# Patient Record
Sex: FEMALE | Race: WHITE | NOT HISPANIC OR LATINO
[De-identification: names, ages, dates, MRNs, and addresses within clinical notes are randomized per-mention and may not be internally consistent; named-entity substitution may affect disease eponyms.]

---

## 2017-02-09 ENCOUNTER — RESULT REVIEW (OUTPATIENT)
Age: 32
End: 2017-02-09

## 2017-02-10 ENCOUNTER — INPATIENT (INPATIENT)
Facility: HOSPITAL | Age: 32
LOS: 0 days | Discharge: HOME | End: 2017-02-11
Attending: COLON & RECTAL SURGERY | Admitting: COLON & RECTAL SURGERY
Payer: COMMERCIAL

## 2017-02-10 ENCOUNTER — EMERGENCY (EMERGENCY)
Facility: HOSPITAL | Age: 32
LOS: 1 days | Discharge: HOME | End: 2017-02-10
Attending: EMERGENCY MEDICINE
Payer: COMMERCIAL

## 2017-02-10 ENCOUNTER — TRANSCRIPTION ENCOUNTER (OUTPATIENT)
Age: 32
End: 2017-02-10

## 2017-02-10 VITALS
OXYGEN SATURATION: 100 % | TEMPERATURE: 98 F | HEART RATE: 90 BPM | WEIGHT: 139.99 LBS | HEIGHT: 67 IN | DIASTOLIC BLOOD PRESSURE: 65 MMHG | RESPIRATION RATE: 16 BRPM | SYSTOLIC BLOOD PRESSURE: 123 MMHG

## 2017-02-10 VITALS
HEIGHT: 67 IN | OXYGEN SATURATION: 99 % | TEMPERATURE: 98 F | WEIGHT: 139.99 LBS | RESPIRATION RATE: 16 BRPM | SYSTOLIC BLOOD PRESSURE: 116 MMHG | HEART RATE: 90 BPM | DIASTOLIC BLOOD PRESSURE: 68 MMHG

## 2017-02-10 DIAGNOSIS — Z90.5 ACQUIRED ABSENCE OF KIDNEY: Chronic | ICD-10-CM

## 2017-02-10 DIAGNOSIS — K37 UNSPECIFIED APPENDICITIS: ICD-10-CM

## 2017-02-10 DIAGNOSIS — Z88.0 ALLERGY STATUS TO PENICILLIN: ICD-10-CM

## 2017-02-10 DIAGNOSIS — K35.3 ACUTE APPENDICITIS WITH LOCALIZED PERITONITIS: ICD-10-CM

## 2017-02-10 DIAGNOSIS — Z98.89 OTHER SPECIFIED POSTPROCEDURAL STATES: Chronic | ICD-10-CM

## 2017-02-10 DIAGNOSIS — Z29.9 ENCOUNTER FOR PROPHYLACTIC MEASURES, UNSPECIFIED: ICD-10-CM

## 2017-02-10 LAB
ALBUMIN SERPL ELPH-MCNC: 3.7 G/DL — SIGNIFICANT CHANGE UP (ref 3.3–5)
ALP SERPL-CCNC: 70 U/L — SIGNIFICANT CHANGE UP (ref 40–120)
ALT FLD-CCNC: 20 U/L — SIGNIFICANT CHANGE UP (ref 12–78)
ANION GAP SERPL CALC-SCNC: 8 MMOL/L — SIGNIFICANT CHANGE UP (ref 5–17)
APPEARANCE UR: ABNORMAL
APTT BLD: 33.5 SEC — SIGNIFICANT CHANGE UP (ref 27.5–37.4)
AST SERPL-CCNC: 15 U/L — SIGNIFICANT CHANGE UP (ref 15–37)
BASOPHILS # BLD AUTO: 0 K/UL — SIGNIFICANT CHANGE UP (ref 0–0.2)
BASOPHILS NFR BLD AUTO: 0.8 % — SIGNIFICANT CHANGE UP (ref 0–2)
BILIRUB SERPL-MCNC: 0.6 MG/DL — SIGNIFICANT CHANGE UP (ref 0.2–1.2)
BILIRUB UR-MCNC: NEGATIVE — SIGNIFICANT CHANGE UP
BLD GP AB SCN SERPL QL: SIGNIFICANT CHANGE UP
BUN SERPL-MCNC: 11 MG/DL — SIGNIFICANT CHANGE UP (ref 7–23)
CALCIUM SERPL-MCNC: 8.6 MG/DL — SIGNIFICANT CHANGE UP (ref 8.5–10.1)
CHLORIDE SERPL-SCNC: 107 MMOL/L — SIGNIFICANT CHANGE UP (ref 96–108)
CO2 SERPL-SCNC: 25 MMOL/L — SIGNIFICANT CHANGE UP (ref 22–31)
COLOR SPEC: YELLOW — SIGNIFICANT CHANGE UP
CREAT SERPL-MCNC: 1.13 MG/DL — SIGNIFICANT CHANGE UP (ref 0.5–1.3)
DIFF PNL FLD: NEGATIVE — SIGNIFICANT CHANGE UP
EOSINOPHIL # BLD AUTO: 0 K/UL — SIGNIFICANT CHANGE UP (ref 0–0.5)
EOSINOPHIL NFR BLD AUTO: 0.3 % — SIGNIFICANT CHANGE UP (ref 0–6)
EPI CELLS # UR: SIGNIFICANT CHANGE UP
GLUCOSE SERPL-MCNC: 86 MG/DL — SIGNIFICANT CHANGE UP (ref 70–99)
GLUCOSE UR QL: NEGATIVE MG/DL — SIGNIFICANT CHANGE UP
HCG SERPL-ACNC: <1 MIU/ML — SIGNIFICANT CHANGE UP
HCT VFR BLD CALC: 41.6 % — SIGNIFICANT CHANGE UP (ref 34.5–45)
HGB BLD-MCNC: 14.8 G/DL — SIGNIFICANT CHANGE UP (ref 11.5–15.5)
INR BLD: 1.12 RATIO — SIGNIFICANT CHANGE UP (ref 0.88–1.16)
KETONES UR-MCNC: NEGATIVE — SIGNIFICANT CHANGE UP
LACTATE SERPL-SCNC: 0.6 MMOL/L — LOW (ref 0.7–2)
LEUKOCYTE ESTERASE UR-ACNC: ABNORMAL
LIDOCAIN IGE QN: 146 U/L — SIGNIFICANT CHANGE UP (ref 73–393)
LYMPHOCYTES # BLD AUTO: 1.3 K/UL — SIGNIFICANT CHANGE UP (ref 1–3.3)
LYMPHOCYTES # BLD AUTO: 27.7 % — SIGNIFICANT CHANGE UP (ref 13–44)
MCHC RBC-ENTMCNC: 30.9 PG — SIGNIFICANT CHANGE UP (ref 27–34)
MCHC RBC-ENTMCNC: 35.6 GM/DL — SIGNIFICANT CHANGE UP (ref 32–36)
MCV RBC AUTO: 86.8 FL — SIGNIFICANT CHANGE UP (ref 80–100)
MONOCYTES # BLD AUTO: 0.2 K/UL — SIGNIFICANT CHANGE UP (ref 0–0.9)
MONOCYTES NFR BLD AUTO: 5.4 % — SIGNIFICANT CHANGE UP (ref 2–14)
NEUTROPHILS # BLD AUTO: 3 K/UL — SIGNIFICANT CHANGE UP (ref 1.8–7.4)
NEUTROPHILS NFR BLD AUTO: 65.8 % — SIGNIFICANT CHANGE UP (ref 43–77)
NITRITE UR-MCNC: NEGATIVE — SIGNIFICANT CHANGE UP
PH UR: 5 — SIGNIFICANT CHANGE UP (ref 4.8–8)
PLATELET # BLD AUTO: 186 K/UL — SIGNIFICANT CHANGE UP (ref 150–400)
POTASSIUM SERPL-MCNC: 3.9 MMOL/L — SIGNIFICANT CHANGE UP (ref 3.5–5.3)
POTASSIUM SERPL-SCNC: 3.9 MMOL/L — SIGNIFICANT CHANGE UP (ref 3.5–5.3)
PROT SERPL-MCNC: 7.7 GM/DL — SIGNIFICANT CHANGE UP (ref 6–8.3)
PROT UR-MCNC: 15 MG/DL
PROTHROM AB SERPL-ACNC: 12.6 SEC — SIGNIFICANT CHANGE UP (ref 10–13.1)
RBC # BLD: 4.8 M/UL — SIGNIFICANT CHANGE UP (ref 3.8–5.2)
RBC # FLD: 11.8 % — SIGNIFICANT CHANGE UP (ref 11–15)
SODIUM SERPL-SCNC: 140 MMOL/L — SIGNIFICANT CHANGE UP (ref 135–145)
SP GR SPEC: 1.02 — SIGNIFICANT CHANGE UP (ref 1.01–1.02)
UROBILINOGEN FLD QL: NEGATIVE MG/DL — SIGNIFICANT CHANGE UP
WBC # BLD: 4.6 K/UL — SIGNIFICANT CHANGE UP (ref 3.8–10.5)
WBC # FLD AUTO: 4.6 K/UL — SIGNIFICANT CHANGE UP (ref 3.8–10.5)
WBC UR QL: SIGNIFICANT CHANGE UP

## 2017-02-10 PROCEDURE — 71010: CPT | Mod: 26

## 2017-02-10 PROCEDURE — 88304 TISSUE EXAM BY PATHOLOGIST: CPT | Mod: 26

## 2017-02-10 PROCEDURE — 74176 CT ABD & PELVIS W/O CONTRAST: CPT | Mod: 26

## 2017-02-10 PROCEDURE — 44970 LAPAROSCOPY APPENDECTOMY: CPT | Mod: AS

## 2017-02-10 PROCEDURE — 99285 EMERGENCY DEPT VISIT HI MDM: CPT

## 2017-02-10 RX ORDER — DOCUSATE SODIUM 100 MG
100 CAPSULE ORAL THREE TIMES A DAY
Qty: 0 | Refills: 0 | Status: DISCONTINUED | OUTPATIENT
Start: 2017-02-10 | End: 2017-02-11

## 2017-02-10 RX ORDER — SODIUM CHLORIDE 9 MG/ML
1000 INJECTION INTRAMUSCULAR; INTRAVENOUS; SUBCUTANEOUS
Qty: 0 | Refills: 0 | Status: DISCONTINUED | OUTPATIENT
Start: 2017-02-10 | End: 2017-02-10

## 2017-02-10 RX ORDER — ACETAMINOPHEN 500 MG
900 TABLET ORAL ONCE
Qty: 0 | Refills: 0 | Status: COMPLETED | OUTPATIENT
Start: 2017-02-10 | End: 2017-02-10

## 2017-02-10 RX ORDER — SODIUM CHLORIDE 9 MG/ML
1000 INJECTION, SOLUTION INTRAVENOUS
Qty: 0 | Refills: 0 | Status: DISCONTINUED | OUTPATIENT
Start: 2017-02-10 | End: 2017-02-10

## 2017-02-10 RX ORDER — ONDANSETRON 8 MG/1
4 TABLET, FILM COATED ORAL EVERY 6 HOURS
Qty: 0 | Refills: 0 | Status: DISCONTINUED | OUTPATIENT
Start: 2017-02-10 | End: 2017-02-11

## 2017-02-10 RX ORDER — METRONIDAZOLE 500 MG
500 TABLET ORAL ONCE
Qty: 0 | Refills: 0 | Status: COMPLETED | OUTPATIENT
Start: 2017-02-10 | End: 2017-02-10

## 2017-02-10 RX ORDER — SENNA PLUS 8.6 MG/1
2 TABLET ORAL AT BEDTIME
Qty: 0 | Refills: 0 | Status: DISCONTINUED | OUTPATIENT
Start: 2017-02-10 | End: 2017-02-11

## 2017-02-10 RX ORDER — KETOROLAC TROMETHAMINE 30 MG/ML
15 SYRINGE (ML) INJECTION EVERY 6 HOURS
Qty: 0 | Refills: 0 | Status: DISCONTINUED | OUTPATIENT
Start: 2017-02-10 | End: 2017-02-11

## 2017-02-10 RX ORDER — ONDANSETRON 8 MG/1
8 TABLET, FILM COATED ORAL ONCE
Qty: 0 | Refills: 0 | Status: DISCONTINUED | OUTPATIENT
Start: 2017-02-10 | End: 2017-02-14

## 2017-02-10 RX ORDER — ONDANSETRON 8 MG/1
4 TABLET, FILM COATED ORAL ONCE
Qty: 0 | Refills: 0 | Status: COMPLETED | OUTPATIENT
Start: 2017-02-10 | End: 2017-02-10

## 2017-02-10 RX ORDER — SODIUM CHLORIDE 9 MG/ML
1000 INJECTION, SOLUTION INTRAVENOUS
Qty: 0 | Refills: 0 | Status: DISCONTINUED | OUTPATIENT
Start: 2017-02-10 | End: 2017-02-11

## 2017-02-10 RX ORDER — KETOROLAC TROMETHAMINE 30 MG/ML
15 SYRINGE (ML) INJECTION ONCE
Qty: 0 | Refills: 0 | Status: DISCONTINUED | OUTPATIENT
Start: 2017-02-10 | End: 2017-02-10

## 2017-02-10 RX ORDER — CIPROFLOXACIN LACTATE 400MG/40ML
400 VIAL (ML) INTRAVENOUS ONCE
Qty: 0 | Refills: 0 | Status: COMPLETED | OUTPATIENT
Start: 2017-02-10 | End: 2017-02-10

## 2017-02-10 RX ORDER — CIPROFLOXACIN LACTATE 400MG/40ML
VIAL (ML) INTRAVENOUS
Qty: 0 | Refills: 0 | Status: DISCONTINUED | OUTPATIENT
Start: 2017-02-10 | End: 2017-02-10

## 2017-02-10 RX ORDER — IOHEXOL 300 MG/ML
30 INJECTION, SOLUTION INTRAVENOUS ONCE
Qty: 0 | Refills: 0 | Status: COMPLETED | OUTPATIENT
Start: 2017-02-10 | End: 2017-02-10

## 2017-02-10 RX ORDER — KETOROLAC TROMETHAMINE 30 MG/ML
15 SYRINGE (ML) INJECTION ONCE
Qty: 0 | Refills: 0 | Status: COMPLETED | OUTPATIENT
Start: 2017-02-10 | End: 2017-02-10

## 2017-02-10 RX ORDER — METRONIDAZOLE 500 MG
TABLET ORAL
Qty: 0 | Refills: 0 | Status: DISCONTINUED | OUTPATIENT
Start: 2017-02-10 | End: 2017-02-10

## 2017-02-10 RX ORDER — METRONIDAZOLE 500 MG
500 TABLET ORAL EVERY 8 HOURS
Qty: 0 | Refills: 0 | Status: CANCELLED | OUTPATIENT
Start: 2017-02-11 | End: 2017-02-10

## 2017-02-10 RX ORDER — FENTANYL CITRATE 50 UG/ML
50 INJECTION INTRAVENOUS
Qty: 0 | Refills: 0 | Status: DISCONTINUED | OUTPATIENT
Start: 2017-02-10 | End: 2017-02-10

## 2017-02-10 RX ORDER — SODIUM CHLORIDE 9 MG/ML
1000 INJECTION INTRAMUSCULAR; INTRAVENOUS; SUBCUTANEOUS ONCE
Qty: 0 | Refills: 0 | Status: COMPLETED | OUTPATIENT
Start: 2017-02-10 | End: 2017-02-10

## 2017-02-10 RX ORDER — HEPARIN SODIUM 5000 [USP'U]/ML
5000 INJECTION INTRAVENOUS; SUBCUTANEOUS EVERY 12 HOURS
Qty: 0 | Refills: 0 | Status: DISCONTINUED | OUTPATIENT
Start: 2017-02-10 | End: 2017-02-11

## 2017-02-10 RX ORDER — HEPARIN SODIUM 5000 [USP'U]/ML
5000 INJECTION INTRAVENOUS; SUBCUTANEOUS EVERY 12 HOURS
Qty: 0 | Refills: 0 | Status: DISCONTINUED | OUTPATIENT
Start: 2017-02-10 | End: 2017-02-10

## 2017-02-10 RX ORDER — CIPROFLOXACIN LACTATE 400MG/40ML
400 VIAL (ML) INTRAVENOUS EVERY 12 HOURS
Qty: 0 | Refills: 0 | Status: CANCELLED | OUTPATIENT
Start: 2017-02-11 | End: 2017-02-10

## 2017-02-10 RX ORDER — ACETAMINOPHEN 500 MG
650 TABLET ORAL EVERY 6 HOURS
Qty: 0 | Refills: 0 | Status: DISCONTINUED | OUTPATIENT
Start: 2017-02-10 | End: 2017-02-11

## 2017-02-10 RX ADMIN — Medication 200 MILLIGRAM(S): at 20:07

## 2017-02-10 RX ADMIN — Medication 15 MILLIGRAM(S): at 19:27

## 2017-02-10 RX ADMIN — Medication 100 MILLIGRAM(S): at 19:53

## 2017-02-10 RX ADMIN — SODIUM CHLORIDE 1000 MILLILITER(S): 9 INJECTION INTRAMUSCULAR; INTRAVENOUS; SUBCUTANEOUS at 13:26

## 2017-02-10 RX ADMIN — FENTANYL CITRATE 50 MICROGRAM(S): 50 INJECTION INTRAVENOUS at 22:41

## 2017-02-10 RX ADMIN — Medication 15 MILLIGRAM(S): at 20:07

## 2017-02-10 RX ADMIN — IOHEXOL 30 MILLILITER(S): 300 INJECTION, SOLUTION INTRAVENOUS at 13:16

## 2017-02-10 RX ADMIN — SODIUM CHLORIDE 100 MILLILITER(S): 9 INJECTION, SOLUTION INTRAVENOUS at 21:55

## 2017-02-10 RX ADMIN — FENTANYL CITRATE 50 MICROGRAM(S): 50 INJECTION INTRAVENOUS at 22:35

## 2017-02-10 RX ADMIN — SODIUM CHLORIDE 100 MILLILITER(S): 9 INJECTION INTRAMUSCULAR; INTRAVENOUS; SUBCUTANEOUS at 19:30

## 2017-02-10 RX ADMIN — ONDANSETRON 4 MILLIGRAM(S): 8 TABLET, FILM COATED ORAL at 19:30

## 2017-02-10 NOTE — ED PROVIDER NOTE - PROGRESS NOTE DETAILS
Pt noted to have appendicitis likely as pt still having umbilical tenderness and pain - when looking for patient -could not be found in ED - call made to patient - who answered stating was too busy in ED -felt was not receiving attention that she needed and so pt left.

## 2017-02-10 NOTE — ED ADULT NURSE REASSESSMENT NOTE - NS ED NURSE REASSESS COMMENT FT1
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

## 2017-02-10 NOTE — ED PROVIDER NOTE - CONSTITUTIONAL, MLM
normal... Well appearing, well nourished, awake, alert, oriented to person, place, time/situation and in mild distress secondary to pain

## 2017-02-10 NOTE — ED PROVIDER NOTE - OBJECTIVE STATEMENT
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 No vomiting.

## 2017-02-10 NOTE — H&P ADULT. - RS GEN PE MLT RESP DETAILS PC
breath sounds equal/respirations non-labored/no chest wall tenderness/clear to auscultation bilaterally/airway patent/good air movement

## 2017-02-10 NOTE — ED ADULT NURSE NOTE - OBJECTIVE STATEMENT
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

## 2017-02-10 NOTE — ED PROVIDER NOTE - OBJECTIVE STATEMENT
31 year old female otherwise healthy only with C- Section hx presenting due to abd pain, tenderness to umbilical area x 8 days, fever/chills noted today, sent  in for appy r/o and pt left after CT in ED - then called now returning for possible surgical intervention of tip appendicitis.

## 2017-02-10 NOTE — H&P ADULT. - HISTORY OF PRESENT ILLNESS
31 year old female with no PMH and PSH of elective left nephrectomy,  x1, b/l knee arthroscopies, and ovarian cystectomy presents to ED complaining of 10/10 constant epigastric abdominal pain that radiates to the RLQ and around the back and began today. Patient states she first had nausea/ vomiting/ and diarrhea starting last week, but she thought it was just a stomach bug. Patient states that today at work a Dr. took her temperature and it was 103.6 and that is why she finally came to the ED. Denies Chest pain, SOB, and dysuria. 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 LMP was 17. Patient is a mother of 3 kids and her son is sick with strep throat. Last meal was chili at around 10AM. CT done in ED was equivocal for early tip appendicitis.

## 2017-02-10 NOTE — ED ADULT NURSE NOTE - NS ED NURSE ELOPE COMMENTS
While on lunch , pt left to Ct scan covered by RN , MD called pt with no response, left with IVL , Nurse manager and LISANDRA Liao made aware. Patient spoke with Dr Coe and told him she will return.

## 2017-02-10 NOTE — ED ADULT TRIAGE NOTE - CHIEF COMPLAINT QUOTE
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

## 2017-02-10 NOTE — ED ADULT NURSE NOTE - CHIEF COMPLAINT QUOTE
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

## 2017-02-11 VITALS — TEMPERATURE: 98 F

## 2017-02-11 LAB
ANION GAP SERPL CALC-SCNC: 9 MMOL/L — SIGNIFICANT CHANGE UP (ref 5–17)
BASOPHILS # BLD AUTO: 0 K/UL — SIGNIFICANT CHANGE UP (ref 0–0.2)
BASOPHILS NFR BLD AUTO: 0.2 % — SIGNIFICANT CHANGE UP (ref 0–2)
BUN SERPL-MCNC: 13 MG/DL — SIGNIFICANT CHANGE UP (ref 7–23)
CALCIUM SERPL-MCNC: 8.4 MG/DL — LOW (ref 8.5–10.1)
CHLORIDE SERPL-SCNC: 110 MMOL/L — HIGH (ref 96–108)
CO2 SERPL-SCNC: 22 MMOL/L — SIGNIFICANT CHANGE UP (ref 22–31)
CREAT SERPL-MCNC: 1 MG/DL — SIGNIFICANT CHANGE UP (ref 0.5–1.3)
CULTURE RESULTS: SIGNIFICANT CHANGE UP
EOSINOPHIL # BLD AUTO: 0 K/UL — SIGNIFICANT CHANGE UP (ref 0–0.5)
EOSINOPHIL NFR BLD AUTO: 0 % — SIGNIFICANT CHANGE UP (ref 0–6)
GLUCOSE SERPL-MCNC: 129 MG/DL — HIGH (ref 70–99)
HCT VFR BLD CALC: 36.5 % — SIGNIFICANT CHANGE UP (ref 34.5–45)
HGB BLD-MCNC: 13.3 G/DL — SIGNIFICANT CHANGE UP (ref 11.5–15.5)
LYMPHOCYTES # BLD AUTO: 0.7 K/UL — LOW (ref 1–3.3)
LYMPHOCYTES # BLD AUTO: 10 % — LOW (ref 13–44)
MAGNESIUM SERPL-MCNC: 1.9 MG/DL — SIGNIFICANT CHANGE UP (ref 1.8–2.4)
MCHC RBC-ENTMCNC: 30.6 PG — SIGNIFICANT CHANGE UP (ref 27–34)
MCHC RBC-ENTMCNC: 36.5 GM/DL — HIGH (ref 32–36)
MCV RBC AUTO: 84 FL — SIGNIFICANT CHANGE UP (ref 80–100)
MONOCYTES # BLD AUTO: 0.1 K/UL — SIGNIFICANT CHANGE UP (ref 0–0.9)
MONOCYTES NFR BLD AUTO: 1.2 % — LOW (ref 2–14)
NEUTROPHILS # BLD AUTO: 6.5 K/UL — SIGNIFICANT CHANGE UP (ref 1.8–7.4)
NEUTROPHILS NFR BLD AUTO: 88.5 % — HIGH (ref 43–77)
PHOSPHATE SERPL-MCNC: 2.2 MG/DL — LOW (ref 2.5–4.5)
PLATELET # BLD AUTO: 183 K/UL — SIGNIFICANT CHANGE UP (ref 150–400)
POTASSIUM SERPL-MCNC: 4.2 MMOL/L — SIGNIFICANT CHANGE UP (ref 3.5–5.3)
POTASSIUM SERPL-SCNC: 4.2 MMOL/L — SIGNIFICANT CHANGE UP (ref 3.5–5.3)
RBC # BLD: 4.35 M/UL — SIGNIFICANT CHANGE UP (ref 3.8–5.2)
RBC # FLD: 10.9 % — LOW (ref 11–15)
SODIUM SERPL-SCNC: 141 MMOL/L — SIGNIFICANT CHANGE UP (ref 135–145)
SPECIMEN SOURCE: SIGNIFICANT CHANGE UP
WBC # BLD: 7.4 K/UL — SIGNIFICANT CHANGE UP (ref 3.8–10.5)
WBC # FLD AUTO: 7.4 K/UL — SIGNIFICANT CHANGE UP (ref 3.8–10.5)

## 2017-02-11 RX ORDER — HYDROMORPHONE HYDROCHLORIDE 2 MG/ML
0.5 INJECTION INTRAMUSCULAR; INTRAVENOUS; SUBCUTANEOUS EVERY 6 HOURS
Qty: 0 | Refills: 0 | Status: DISCONTINUED | OUTPATIENT
Start: 2017-02-11 | End: 2017-02-11

## 2017-02-11 RX ORDER — ACETAMINOPHEN 500 MG
1000 TABLET ORAL ONCE
Qty: 0 | Refills: 0 | Status: COMPLETED | OUTPATIENT
Start: 2017-02-11 | End: 2017-02-11

## 2017-02-11 RX ORDER — TRAMADOL HYDROCHLORIDE 50 MG/1
50 TABLET ORAL EVERY 6 HOURS
Qty: 0 | Refills: 0 | Status: DISCONTINUED | OUTPATIENT
Start: 2017-02-11 | End: 2017-02-11

## 2017-02-11 RX ORDER — TRAMADOL HYDROCHLORIDE 50 MG/1
1 TABLET ORAL
Qty: 15 | Refills: 0 | OUTPATIENT
Start: 2017-02-11

## 2017-02-11 RX ORDER — HYDROMORPHONE HYDROCHLORIDE 2 MG/ML
0.5 INJECTION INTRAMUSCULAR; INTRAVENOUS; SUBCUTANEOUS EVERY 4 HOURS
Qty: 0 | Refills: 0 | Status: DISCONTINUED | OUTPATIENT
Start: 2017-02-11 | End: 2017-02-11

## 2017-02-11 RX ORDER — HYDROMORPHONE HYDROCHLORIDE 2 MG/ML
0.5 INJECTION INTRAMUSCULAR; INTRAVENOUS; SUBCUTANEOUS ONCE
Qty: 0 | Refills: 0 | Status: DISCONTINUED | OUTPATIENT
Start: 2017-02-11 | End: 2017-02-11

## 2017-02-11 RX ORDER — ACETAMINOPHEN 500 MG
650 TABLET ORAL EVERY 6 HOURS
Qty: 0 | Refills: 0 | Status: DISCONTINUED | OUTPATIENT
Start: 2017-02-11 | End: 2017-02-11

## 2017-02-11 RX ORDER — TRAMADOL HYDROCHLORIDE 50 MG/1
1 TABLET ORAL
Qty: 0 | Refills: 0 | COMMUNITY
Start: 2017-02-11

## 2017-02-11 RX ADMIN — Medication 100 MILLIGRAM(S): at 17:39

## 2017-02-11 RX ADMIN — ONDANSETRON 4 MILLIGRAM(S): 8 TABLET, FILM COATED ORAL at 12:46

## 2017-02-11 RX ADMIN — Medication 650 MILLIGRAM(S): at 17:41

## 2017-02-11 RX ADMIN — HYDROMORPHONE HYDROCHLORIDE 0.5 MILLIGRAM(S): 2 INJECTION INTRAMUSCULAR; INTRAVENOUS; SUBCUTANEOUS at 06:15

## 2017-02-11 RX ADMIN — Medication 650 MILLIGRAM(S): at 18:40

## 2017-02-11 RX ADMIN — Medication 400 MILLIGRAM(S): at 00:50

## 2017-02-11 RX ADMIN — SODIUM CHLORIDE 100 MILLILITER(S): 9 INJECTION, SOLUTION INTRAVENOUS at 06:00

## 2017-02-11 RX ADMIN — HYDROMORPHONE HYDROCHLORIDE 0.5 MILLIGRAM(S): 2 INJECTION INTRAMUSCULAR; INTRAVENOUS; SUBCUTANEOUS at 02:32

## 2017-02-11 RX ADMIN — HEPARIN SODIUM 5000 UNIT(S): 5000 INJECTION INTRAVENOUS; SUBCUTANEOUS at 17:39

## 2017-02-11 RX ADMIN — Medication 1000 MILLIGRAM(S): at 01:05

## 2017-02-11 RX ADMIN — HYDROMORPHONE HYDROCHLORIDE 0.5 MILLIGRAM(S): 2 INJECTION INTRAMUSCULAR; INTRAVENOUS; SUBCUTANEOUS at 05:57

## 2017-02-11 RX ADMIN — HYDROMORPHONE HYDROCHLORIDE 0.5 MILLIGRAM(S): 2 INJECTION INTRAMUSCULAR; INTRAVENOUS; SUBCUTANEOUS at 02:50

## 2017-02-11 RX ADMIN — ONDANSETRON 4 MILLIGRAM(S): 8 TABLET, FILM COATED ORAL at 06:05

## 2017-02-11 RX ADMIN — Medication 100 MILLIGRAM(S): at 06:00

## 2017-02-11 RX ADMIN — HEPARIN SODIUM 5000 UNIT(S): 5000 INJECTION INTRAVENOUS; SUBCUTANEOUS at 06:00

## 2017-02-11 NOTE — DISCHARGE NOTE ADULT - HOSPITAL COURSE
Patient was restricted from oral intake with IVF support.  She underwent a laparoscopic appendectomy without complication.  Post-operatively, pain was controlled,  diet advanced, and the patient was mobilized.  Upon discharge the patient was ambulatory, able to void freely, tolerating a diet and stable for discharge home.

## 2017-02-11 NOTE — DISCHARGE NOTE ADULT - INSTRUCTIONS
Activity: Do not lift anything greater than 10 lbs x 6 weeks.    Diet: Resume your regular diet leave steri-strips on they will fall off on there own

## 2017-02-11 NOTE — DISCHARGE NOTE ADULT - NS AS ACTIVITY OBS
Do not drive or operate machinery/Walking-Outdoors allowed/Walking-Indoors allowed/Stairs allowed/Showering allowed/No Heavy lifting/straining/Do not make important decisions

## 2017-02-11 NOTE — PHARMACY COMMUNICATION NOTE - COMMENTS
PA wants the dilaudid and says patient is on all day. I explained the anaphylaxis to morphine on profile and wants order as is.

## 2017-02-11 NOTE — PROGRESS NOTE ADULT - SUBJECTIVE AND OBJECTIVE BOX
INTERVAL HPI/OVERNIGHT EVENTS:    Patient lying comfortably.  Complaint of postop pain improved on current pain regimen.   Complaint of nausea overnight.  +Flatus/No BM.  Voiding good.       Vital Signs Last 24 Hrs  T(C): 37, Max: 37.1 (-10 @ 19:58)  T(F): 98.6, Max: 98.8 (02-10 @ 19:58)  HR: 82 (58 - 104)  BP: 131/66 (100/70 - 132/72)  BP(mean): --  RR: 16 (15 - 17)  SpO2: 96% (96% - 100%)    MEDICATIONS  (STANDING):  heparin  Injectable 5000Unit(s) SubCutaneous every 12 hours  dextrose 5% + sodium chloride 0.45%. 1000milliLiter(s) IV Continuous <Continuous>  senna 2Tablet(s) Oral at bedtime  docusate sodium 100milliGRAM(s) Oral three times a day    MEDICATIONS  (PRN):  acetaminophen   Tablet 650milliGRAM(s) Oral every 6 hours PRN For Temp greater than 38 C (100.4 F)  ondansetron Injectable 4milliGRAM(s) IV Push every 6 hours PRN Nausea  HYDROmorphone  Injectable 0.5milliGRAM(s) IV Push every 4 hours PRN Moderate Pain (4 - 6)      PHYSICAL EXAM:    GENERAL: NAD  HEAD:  Atraumatic, Normocephalic  EYES: EOMI, PERRLA, conjunctiva and sclera clear  CHEST/LUNG: Clear to ausculation, bilaterally   HEART: S1S2  ABDOMEN: wound dressing c/d/i, non distended, +BS, soft, +tenderness postop area, no guarding  EXTREMITIES:  calf soft, non tender     I&O's Detail    I & Os for current day (as of 2017 05:17)  =============================================  IN:    lactated ringers.: 100 ml    Total IN: 100 ml  ---------------------------------------------  OUT:    Total OUT: 0 ml  ---------------------------------------------  Total NET: 100 ml      LABS:                        14.8   4.6   )-----------( 186      ( 10 Feb 2017 13:37 )             41.6     10 Feb 2017 13:37    140    |  107    |  11     ----------------------------<  86     3.9     |  25     |  1.13     Ca    8.6        10 Feb 2017 13:37    TPro  7.7    /  Alb  3.7    /  TBili  0.6    /  DBili  x      /  AST  15     /  ALT  20     /  AlkPhos  70     10 Feb 2017 13:37    PT/INR - ( 10 Feb 2017 18:49 )   PT: 12.6 sec;   INR: 1.12 ratio         PTT - ( 10 Feb 2017 18:49 )  PTT:33.5 sec  Urinalysis Basic - ( 10 Feb 2017 13:37 )    Color: Yellow / Appearance: very cloudy / S.020 / pH: x  Gluc: x / Ketone: Negative  / Bili: Negative / Urobili: Negative mg/dL   Blood: x / Protein: 15 mg/dL / Nitrite: Negative   Leuk Esterase: Trace / RBC: x / WBC 3-5   Sq Epi: x / Non Sq Epi: Few / Bacteria: x      Impression:  A 31F hx of nephrectomy, c/o Acute Appendicitis with localized peritonitis s/p Laparoscopic Appendectomy POD 1     Plan:  pain management  anti emetic   advance  diet as tolerated   continue medical management/supportive care   prophylactic measure:  Incentive spirometry, venodynes, dvt/gi prophylaxis, bowel regiment oob to ambulate as tolerated   dc planning

## 2017-02-11 NOTE — DISCHARGE NOTE ADULT - PATIENT PORTAL LINK FT
“You can access the FollowHealth Patient Portal, offered by [facility], by registering with the following website: [email or web address]”

## 2017-02-11 NOTE — DISCHARGE NOTE ADULT - CARE PROVIDER_API CALL
Edgar Mcpherson), ColonRectal Surgery; Surgery  [address] [place] [address]  Phone: [phone]  Fax: [phone]

## 2017-02-11 NOTE — DISCHARGE NOTE ADULT - MEDICATION SUMMARY - MEDICATIONS TO STOP TAKING
I will STOP taking the medications listed below when I get home from the hospital:  None I will STOP taking the medications listed below when I get home from the hospital:    naproxen 500 mg oral tablet  -- 1 tab(s) by mouth 2 times a day  -- Check with your doctor before becoming pregnant.  May cause drowsiness or dizziness.  Obtain medical advice before taking any non-prescription drugs as some may affect the action of this medication.  Take with food or milk.

## 2017-02-11 NOTE — DISCHARGE NOTE ADULT - CARE PLAN
Principal Discharge DX:	Acute appendicitis with localized peritonitis  Goal:	follow-up with Dr Mcpherson in 7-10 days call the office and make an appointment  Instructions for follow-up, activity and diet:	Activity: Do not lift anything greater than 10 lbs x 6 weeks.  Wound:  Keep Clean and Dry.  You may shower, and pat dry.  No baths, no soaking in a tub.  Diet: Resume your regular diet

## 2017-02-11 NOTE — DISCHARGE NOTE ADULT - PLAN OF CARE
follow-up with Dr Mcpherson in 7-10 days call the office and make an appointment Activity: Do not lift anything greater than 10 lbs x 6 weeks.  Wound:  Keep Clean and Dry.  You may shower, and pat dry.  No baths, no soaking in a tub.  Diet: Resume your regular diet

## 2017-02-11 NOTE — DISCHARGE NOTE ADULT - CARE PROVIDERS DIRECT ADDRESSES
[email or web address]

## 2017-02-11 NOTE — DISCHARGE NOTE ADULT - MEDICATION SUMMARY - MEDICATIONS TO TAKE
I will START or STAY ON the medications listed below when I get home from the hospital:    naproxen 500 mg oral tablet  -- 1 tab(s) by mouth 2 times a day  -- Check with your doctor before becoming pregnant.  May cause drowsiness or dizziness.  Obtain medical advice before taking any non-prescription drugs as some may affect the action of this medication.  Take with food or milk.    -- Indication: For Pain I will START or STAY ON the medications listed below when I get home from the hospital:    traMADol 50 mg oral tablet  -- 1 tab(s) by mouth every 6 hours, As Needed  -- Indication: For Pain

## 2017-02-15 DIAGNOSIS — Z28.21 IMMUNIZATION NOT CARRIED OUT BECAUSE OF PATIENT REFUSAL: ICD-10-CM

## 2017-02-15 DIAGNOSIS — K35.80 UNSPECIFIED ACUTE APPENDICITIS: ICD-10-CM

## 2017-02-15 DIAGNOSIS — Z88.0 ALLERGY STATUS TO PENICILLIN: ICD-10-CM

## 2017-02-15 DIAGNOSIS — Z79.1 LONG TERM (CURRENT) USE OF NON-STEROIDAL ANTI-INFLAMMATORIES (NSAID): ICD-10-CM

## 2017-02-15 DIAGNOSIS — R00.0 TACHYCARDIA, UNSPECIFIED: ICD-10-CM

## 2017-02-15 DIAGNOSIS — Z88.5 ALLERGY STATUS TO NARCOTIC AGENT: ICD-10-CM

## 2017-02-15 DIAGNOSIS — K35.3 ACUTE APPENDICITIS WITH LOCALIZED PERITONITIS: ICD-10-CM

## 2017-02-15 LAB — SURGICAL PATHOLOGY FINAL REPORT - CH: SIGNIFICANT CHANGE UP

## 2017-02-23 PROBLEM — K35.3 ACUTE APPENDICITIS WITH LOCALIZED PERITONITIS: Status: RESOLVED | Noted: 2017-02-23 | Resolved: 2017-02-23

## 2017-02-28 ENCOUNTER — APPOINTMENT (OUTPATIENT)
Dept: SURGERY | Facility: CLINIC | Age: 32
End: 2017-02-28

## 2017-04-27 ENCOUNTER — EMERGENCY (EMERGENCY)
Facility: HOSPITAL | Age: 32
LOS: 1 days | Discharge: HOME | End: 2017-04-27
Attending: EMERGENCY MEDICINE
Payer: COMMERCIAL

## 2017-04-27 VITALS
HEIGHT: 67 IN | DIASTOLIC BLOOD PRESSURE: 54 MMHG | WEIGHT: 154.1 LBS | OXYGEN SATURATION: 98 % | HEART RATE: 86 BPM | TEMPERATURE: 98 F | RESPIRATION RATE: 18 BRPM | SYSTOLIC BLOOD PRESSURE: 90 MMHG

## 2017-04-27 DIAGNOSIS — Z98.89 OTHER SPECIFIED POSTPROCEDURAL STATES: Chronic | ICD-10-CM

## 2017-04-27 DIAGNOSIS — R10.9 UNSPECIFIED ABDOMINAL PAIN: ICD-10-CM

## 2017-04-27 DIAGNOSIS — N83.209 UNSPECIFIED OVARIAN CYST, UNSPECIFIED SIDE: ICD-10-CM

## 2017-04-27 DIAGNOSIS — Z90.5 ACQUIRED ABSENCE OF KIDNEY: Chronic | ICD-10-CM

## 2017-04-27 LAB
APPEARANCE UR: CLEAR — SIGNIFICANT CHANGE UP
APTT BLD: 33.5 SEC — SIGNIFICANT CHANGE UP (ref 27.5–37.4)
BACTERIA # UR AUTO: ABNORMAL
BASOPHILS # BLD AUTO: 0.1 K/UL — SIGNIFICANT CHANGE UP (ref 0–0.2)
BASOPHILS NFR BLD AUTO: 1.5 % — SIGNIFICANT CHANGE UP (ref 0–2)
BILIRUB UR-MCNC: NEGATIVE — SIGNIFICANT CHANGE UP
COLOR SPEC: YELLOW — SIGNIFICANT CHANGE UP
DIFF PNL FLD: NEGATIVE — SIGNIFICANT CHANGE UP
EOSINOPHIL # BLD AUTO: 0 K/UL — SIGNIFICANT CHANGE UP (ref 0–0.5)
EOSINOPHIL NFR BLD AUTO: 0.8 % — SIGNIFICANT CHANGE UP (ref 0–6)
EPI CELLS # UR: ABNORMAL
GLUCOSE UR QL: NEGATIVE MG/DL — SIGNIFICANT CHANGE UP
HCT VFR BLD CALC: 38 % — SIGNIFICANT CHANGE UP (ref 34.5–45)
HGB BLD-MCNC: 13.9 G/DL — SIGNIFICANT CHANGE UP (ref 11.5–15.5)
INR BLD: 1.08 RATIO — SIGNIFICANT CHANGE UP (ref 0.88–1.16)
KETONES UR-MCNC: NEGATIVE — SIGNIFICANT CHANGE UP
LACTATE SERPL-SCNC: 1 MMOL/L — SIGNIFICANT CHANGE UP (ref 0.7–2)
LEUKOCYTE ESTERASE UR-ACNC: NEGATIVE — SIGNIFICANT CHANGE UP
LYMPHOCYTES # BLD AUTO: 2.4 K/UL — SIGNIFICANT CHANGE UP (ref 1–3.3)
LYMPHOCYTES # BLD AUTO: 42.4 % — SIGNIFICANT CHANGE UP (ref 13–44)
MCHC RBC-ENTMCNC: 31.6 PG — SIGNIFICANT CHANGE UP (ref 27–34)
MCHC RBC-ENTMCNC: 36.6 GM/DL — HIGH (ref 32–36)
MCV RBC AUTO: 86.5 FL — SIGNIFICANT CHANGE UP (ref 80–100)
MONOCYTES # BLD AUTO: 0.4 K/UL — SIGNIFICANT CHANGE UP (ref 0–0.9)
MONOCYTES NFR BLD AUTO: 6.8 % — SIGNIFICANT CHANGE UP (ref 2–14)
NEUTROPHILS # BLD AUTO: 2.7 K/UL — SIGNIFICANT CHANGE UP (ref 1.8–7.4)
NEUTROPHILS NFR BLD AUTO: 48.4 % — SIGNIFICANT CHANGE UP (ref 43–77)
NITRITE UR-MCNC: NEGATIVE — SIGNIFICANT CHANGE UP
PH UR: 6 — SIGNIFICANT CHANGE UP (ref 5–8)
PLATELET # BLD AUTO: 185 K/UL — SIGNIFICANT CHANGE UP (ref 150–400)
PROT UR-MCNC: 15 MG/DL
PROTHROM AB SERPL-ACNC: 11.8 SEC — SIGNIFICANT CHANGE UP (ref 9.8–12.7)
RBC # BLD: 4.39 M/UL — SIGNIFICANT CHANGE UP (ref 3.8–5.2)
RBC # FLD: 11.9 % — SIGNIFICANT CHANGE UP (ref 11–15)
RBC CASTS # UR COMP ASSIST: SIGNIFICANT CHANGE UP /HPF (ref 0–4)
SP GR SPEC: 1.02 — SIGNIFICANT CHANGE UP (ref 1.01–1.02)
UROBILINOGEN FLD QL: NEGATIVE MG/DL — SIGNIFICANT CHANGE UP
WBC # BLD: 5.7 K/UL — SIGNIFICANT CHANGE UP (ref 3.8–10.5)
WBC # FLD AUTO: 5.7 K/UL — SIGNIFICANT CHANGE UP (ref 3.8–10.5)
WBC UR QL: ABNORMAL

## 2017-04-27 PROCEDURE — 99285 EMERGENCY DEPT VISIT HI MDM: CPT

## 2017-04-27 RX ORDER — SODIUM CHLORIDE 9 MG/ML
3 INJECTION INTRAMUSCULAR; INTRAVENOUS; SUBCUTANEOUS ONCE
Qty: 0 | Refills: 0 | Status: COMPLETED | OUTPATIENT
Start: 2017-04-27 | End: 2017-04-27

## 2017-04-27 RX ADMIN — SODIUM CHLORIDE 3 MILLILITER(S): 9 INJECTION INTRAMUSCULAR; INTRAVENOUS; SUBCUTANEOUS at 22:59

## 2017-04-27 NOTE — ED ADULT NURSE NOTE - OBJECTIVE STATEMENT
pt is AxOx4; c/o L side pain; had sono done today - ovarian cyst bursted; 2009 - dx ovarian CA. pain started 4 days. c/o nausea and HA; denies vomiting.

## 2017-04-27 NOTE — ED ADULT TRIAGE NOTE - CHIEF COMPLAINT QUOTE
Pt complains of pain to the entire left side of abdomen 10/10 descried as "it's just there and constant". States she had an ovarian cyst and it ruptured. States she has a previous history of ovarian cancer. Sent in by PMD.

## 2017-04-28 VITALS
TEMPERATURE: 98 F | DIASTOLIC BLOOD PRESSURE: 72 MMHG | OXYGEN SATURATION: 98 % | SYSTOLIC BLOOD PRESSURE: 98 MMHG | HEART RATE: 77 BPM | RESPIRATION RATE: 18 BRPM

## 2017-04-28 LAB
ALBUMIN SERPL ELPH-MCNC: 3.6 G/DL — SIGNIFICANT CHANGE UP (ref 3.3–5)
ALP SERPL-CCNC: 67 U/L — SIGNIFICANT CHANGE UP (ref 40–120)
ALT FLD-CCNC: 19 U/L — SIGNIFICANT CHANGE UP (ref 12–78)
ANION GAP SERPL CALC-SCNC: 7 MMOL/L — SIGNIFICANT CHANGE UP (ref 5–17)
AST SERPL-CCNC: 14 U/L — LOW (ref 15–37)
BILIRUB SERPL-MCNC: 0.2 MG/DL — SIGNIFICANT CHANGE UP (ref 0.2–1.2)
BUN SERPL-MCNC: 15 MG/DL — SIGNIFICANT CHANGE UP (ref 7–23)
CALCIUM SERPL-MCNC: 8.6 MG/DL — SIGNIFICANT CHANGE UP (ref 8.5–10.1)
CHLORIDE SERPL-SCNC: 108 MMOL/L — SIGNIFICANT CHANGE UP (ref 96–108)
CO2 SERPL-SCNC: 28 MMOL/L — SIGNIFICANT CHANGE UP (ref 22–31)
CREAT SERPL-MCNC: 1.08 MG/DL — SIGNIFICANT CHANGE UP (ref 0.5–1.3)
GLUCOSE SERPL-MCNC: 86 MG/DL — SIGNIFICANT CHANGE UP (ref 70–99)
HCG SERPL-ACNC: <1 MIU/ML — SIGNIFICANT CHANGE UP
LIDOCAIN IGE QN: 120 U/L — SIGNIFICANT CHANGE UP (ref 73–393)
POTASSIUM SERPL-MCNC: 3.7 MMOL/L — SIGNIFICANT CHANGE UP (ref 3.5–5.3)
POTASSIUM SERPL-SCNC: 3.7 MMOL/L — SIGNIFICANT CHANGE UP (ref 3.5–5.3)
PROT SERPL-MCNC: 7.3 GM/DL — SIGNIFICANT CHANGE UP (ref 6–8.3)
SODIUM SERPL-SCNC: 143 MMOL/L — SIGNIFICANT CHANGE UP (ref 135–145)

## 2017-04-28 PROCEDURE — 74177 CT ABD & PELVIS W/CONTRAST: CPT | Mod: 26

## 2017-04-28 RX ORDER — KETOROLAC TROMETHAMINE 30 MG/ML
30 SYRINGE (ML) INJECTION ONCE
Qty: 0 | Refills: 0 | Status: COMPLETED | OUTPATIENT
Start: 2017-04-28 | End: 2017-04-28

## 2017-04-28 RX ORDER — KETOROLAC TROMETHAMINE 30 MG/ML
30 SYRINGE (ML) INJECTION ONCE
Qty: 0 | Refills: 0 | Status: DISCONTINUED | OUTPATIENT
Start: 2017-04-28 | End: 2017-04-28

## 2017-04-28 RX ORDER — IBUPROFEN 200 MG
1 TABLET ORAL
Qty: 30 | Refills: 0
Start: 2017-04-28 | End: 2017-05-28

## 2017-04-28 NOTE — ED PROVIDER NOTE - PHYSICAL EXAMINATION
General:     NAD, well-nourished, well-appearing  Head:     NC/AT, EOMI, oral mucosa moist  Neck:     trachea midline  Lungs:     CTA b/l, no w/r/r  CVS:     S1S2, RRR, no m/g/r  Abd:     +BS, minimal ttp @ llq, no rebound or guarding, s/nd, no organomegaly  Ext:    2+ radial and pedal pulses, no c/c/e  Neuro: AAOx3, no sensory/motor deficits

## 2017-04-28 NOTE — ED PROVIDER NOTE - OBJECTIVE STATEMENT
31yoF; with pmh signif for Ovarian Ca, Ovarian cysts; now p/w abd pain--LLQ abd pain, cramping, intermittent, x3days. denies n/v/d. denies dysuria, hematuria, frequency, urgency. denies f/c/s. denies vaginal discharge. denies sick contacts. denies travel.  denies unusual PO intake. states she had US of pelvis due to pain that showed irregularity of ovarian tube and radiology recommended CT for further evaluation.  patient sent by pmd for ct. 31yoF; with pmh signif for Ovarian Ca, Ovarian cysts; now p/w abd pain--LLQ abd pain, cramping, intermittent, x3days. denies n/v/d. denies dysuria, hematuria, frequency, urgency. denies f/c/s. denies vaginal discharge. denies sick contacts. denies travel.  denies unusual PO intake. states she had US of pelvis due to pain that showed irregularity of ovarian tube and ruptured ovarian cyst with free fluid in pelvis. radiology recommended CT for further evaluation.  patient sent by pmd for ct.

## 2019-04-27 ENCOUNTER — RESULT REVIEW (OUTPATIENT)
Age: 34
End: 2019-04-27

## 2019-05-13 ENCOUNTER — APPOINTMENT (OUTPATIENT)
Dept: OBGYN | Facility: CLINIC | Age: 34
End: 2019-05-13

## 2019-06-08 ENCOUNTER — RESULT REVIEW (OUTPATIENT)
Age: 34
End: 2019-06-08

## 2020-01-31 ENCOUNTER — EMERGENCY (EMERGENCY)
Facility: HOSPITAL | Age: 35
LOS: 1 days | Discharge: HOME | End: 2020-01-31
Attending: EMERGENCY MEDICINE | Admitting: EMERGENCY MEDICINE
Payer: COMMERCIAL

## 2020-01-31 VITALS
OXYGEN SATURATION: 100 % | TEMPERATURE: 98 F | RESPIRATION RATE: 18 BRPM | DIASTOLIC BLOOD PRESSURE: 66 MMHG | SYSTOLIC BLOOD PRESSURE: 101 MMHG | HEART RATE: 56 BPM

## 2020-01-31 DIAGNOSIS — Z98.890 OTHER SPECIFIED POSTPROCEDURAL STATES: Chronic | ICD-10-CM

## 2020-01-31 DIAGNOSIS — Z98.89 OTHER SPECIFIED POSTPROCEDURAL STATES: Chronic | ICD-10-CM

## 2020-01-31 DIAGNOSIS — Z90.5 ACQUIRED ABSENCE OF KIDNEY: Chronic | ICD-10-CM

## 2020-01-31 LAB
BASOPHILS # BLD AUTO: 0.02 K/UL — SIGNIFICANT CHANGE UP (ref 0–0.2)
BASOPHILS NFR BLD AUTO: 0.4 % — SIGNIFICANT CHANGE UP (ref 0–2)
EOSINOPHIL # BLD AUTO: 0.1 K/UL — SIGNIFICANT CHANGE UP (ref 0–0.5)
EOSINOPHIL NFR BLD AUTO: 1.9 % — SIGNIFICANT CHANGE UP (ref 0–6)
HCT VFR BLD CALC: 35.7 % — SIGNIFICANT CHANGE UP (ref 34.5–45)
HGB BLD-MCNC: 11.2 G/DL — LOW (ref 11.5–15.5)
IMM GRANULOCYTES NFR BLD AUTO: 0.2 % — SIGNIFICANT CHANGE UP (ref 0–1.5)
LYMPHOCYTES # BLD AUTO: 2.28 K/UL — SIGNIFICANT CHANGE UP (ref 1–3.3)
LYMPHOCYTES # BLD AUTO: 44.4 % — HIGH (ref 13–44)
MCHC RBC-ENTMCNC: 27.3 PG — SIGNIFICANT CHANGE UP (ref 27–34)
MCHC RBC-ENTMCNC: 31.4 % — LOW (ref 32–36)
MCV RBC AUTO: 86.9 FL — SIGNIFICANT CHANGE UP (ref 80–100)
MONOCYTES # BLD AUTO: 0.31 K/UL — SIGNIFICANT CHANGE UP (ref 0–0.9)
MONOCYTES NFR BLD AUTO: 6 % — SIGNIFICANT CHANGE UP (ref 2–14)
NEUTROPHILS # BLD AUTO: 2.41 K/UL — SIGNIFICANT CHANGE UP (ref 1.8–7.4)
NEUTROPHILS NFR BLD AUTO: 47.1 % — SIGNIFICANT CHANGE UP (ref 43–77)
NRBC # FLD: 0 K/UL — SIGNIFICANT CHANGE UP (ref 0–0)
PLATELET # BLD AUTO: 240 K/UL — SIGNIFICANT CHANGE UP (ref 150–400)
PMV BLD: 10.4 FL — SIGNIFICANT CHANGE UP (ref 7–13)
RBC # BLD: 4.11 M/UL — SIGNIFICANT CHANGE UP (ref 3.8–5.2)
RBC # FLD: 12.7 % — SIGNIFICANT CHANGE UP (ref 10.3–14.5)
WBC # BLD: 5.13 K/UL — SIGNIFICANT CHANGE UP (ref 3.8–10.5)
WBC # FLD AUTO: 5.13 K/UL — SIGNIFICANT CHANGE UP (ref 3.8–10.5)

## 2020-01-31 PROCEDURE — 99285 EMERGENCY DEPT VISIT HI MDM: CPT

## 2020-01-31 RX ORDER — KETOROLAC TROMETHAMINE 30 MG/ML
15 SYRINGE (ML) INJECTION ONCE
Refills: 0 | Status: DISCONTINUED | OUTPATIENT
Start: 2020-01-31 | End: 2020-01-31

## 2020-01-31 RX ORDER — SODIUM CHLORIDE 9 MG/ML
1000 INJECTION INTRAMUSCULAR; INTRAVENOUS; SUBCUTANEOUS ONCE
Refills: 0 | Status: COMPLETED | OUTPATIENT
Start: 2020-01-31 | End: 2020-01-31

## 2020-01-31 RX ADMIN — SODIUM CHLORIDE 2000 MILLILITER(S): 9 INJECTION INTRAMUSCULAR; INTRAVENOUS; SUBCUTANEOUS at 23:41

## 2020-01-31 RX ADMIN — Medication 15 MILLIGRAM(S): at 23:41

## 2020-01-31 NOTE — ED ADULT NURSE NOTE - NSIMPLEMENTINTERV_GEN_ALL_ED
Implemented All Universal Safety Interventions:  [place] to call system. Call bell, personal items and telephone within reach. Instruct patient to call for assistance. Room bathroom lighting operational. Non-slip footwear when patient is off stretcher. Physically safe environment: no spills, clutter or unnecessary equipment. Stretcher in lowest position, wheels locked, appropriate side rails in place.

## 2020-01-31 NOTE — ED PROVIDER NOTE - PATIENT PORTAL LINK FT
You can access the FollowMyHealth Patient Portal offered by [facility] by registering at the following website: [email or web address]. By joining [employer]’s FollowMyHealth portal, you will also be able to view your health information using other applications (apps) compatible with our system.

## 2020-01-31 NOTE — ED PROVIDER NOTE - CLINICAL SUMMARY MEDICAL DECISION MAKING FREE TEXT BOX
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 Plan for CT, labs, and pain management to r/o colitis vs diverticulitis.

## 2020-01-31 NOTE — ED PROVIDER NOTE - NSFOLLOWUPINSTRUCTIONS_ED_ALL_ED_FT
A source for your pain was not found.  Please see your primary care doctor for follow up.  You should also consider seeing a Spine specialist since the kind of pain your are describing may be starting from your back.     Return to the ER for worsening pain, vomiting, fevers, bleeding, or other concerning signs.

## 2020-01-31 NOTE — ED ADULT TRIAGE NOTE - CHIEF COMPLAINT QUOTE
Patient had c/o left sided pain and bulging when she lies down. Pt went to [facility] for these symptoms and she was sent to rule out hernia. Pt states that she also donated her left kidney years ago.

## 2020-01-31 NOTE — ED ADULT NURSE NOTE - CHIEF COMPLAINT QUOTE
Patient had c/o left sided pain and bulging when she lies down. Pt went to [facility] for these symptoms and she was sent to rule out hernia. Pt states that she also donated her left kidney years ago.

## 2020-01-31 NOTE — ED PROVIDER NOTE - PROGRESS NOTE DETAILS
DD ED ATTG: rec'd s/o from Dr Alexis.  34F h/o kidney donation; h/o tummy tuck 1 year ago pt reports swelling periumbilical, having some LLQ pain, r/o diverticulitis/colitis.  No V, no fever.  Pt still having pain.  Does report all to morphine "heart stopped" but has had dilaudid in the past during her appendectomy, will rx low dose here and continue to monitor. ED Attending: Donny  Pt signed out to me from Dr. Silverio to f/u and reassess.  CTAP and labs unrevealing.  Abd is non tender, soft.  No palp hernias. All results d/w pt.  She feels well now, but describes pain wrapping around lower back to mid abd.  I recc outpt pmd and Spine f/u as well as routine Gyn f/u (has March appt).

## 2020-01-31 NOTE — ED PROVIDER NOTE - OBJECTIVE STATEMENT
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 She denies any N/V/D or fever. She has been urinating well. Pt is concerned due to her pain as she states she never has pain. Currently during her period.

## 2020-02-01 VITALS
RESPIRATION RATE: 18 BRPM | DIASTOLIC BLOOD PRESSURE: 61 MMHG | TEMPERATURE: 98 F | HEART RATE: 66 BPM | SYSTOLIC BLOOD PRESSURE: 96 MMHG | OXYGEN SATURATION: 100 %

## 2020-02-01 LAB
ALBUMIN SERPL ELPH-MCNC: 3.9 G/DL — SIGNIFICANT CHANGE UP (ref 3.3–5)
ALP SERPL-CCNC: 63 U/L — SIGNIFICANT CHANGE UP (ref 40–120)
ALT FLD-CCNC: 6 U/L — SIGNIFICANT CHANGE UP (ref 4–33)
ANION GAP SERPL CALC-SCNC: 9 MMO/L — SIGNIFICANT CHANGE UP (ref 7–14)
APPEARANCE UR: CLEAR — SIGNIFICANT CHANGE UP
AST SERPL-CCNC: 9 U/L — SIGNIFICANT CHANGE UP (ref 4–32)
BACTERIA # UR AUTO: SIGNIFICANT CHANGE UP
BILIRUB SERPL-MCNC: < 0.2 MG/DL — LOW (ref 0.2–1.2)
BILIRUB UR-MCNC: NEGATIVE — SIGNIFICANT CHANGE UP
BLOOD UR QL VISUAL: HIGH
BUN SERPL-MCNC: 12 MG/DL — SIGNIFICANT CHANGE UP (ref 7–23)
CALCIUM SERPL-MCNC: 9.2 MG/DL — SIGNIFICANT CHANGE UP (ref 8.4–10.5)
CHLORIDE SERPL-SCNC: 103 MMOL/L — SIGNIFICANT CHANGE UP (ref 98–107)
CO2 SERPL-SCNC: 24 MMOL/L — SIGNIFICANT CHANGE UP (ref 22–31)
COLOR SPEC: YELLOW — SIGNIFICANT CHANGE UP
CREAT SERPL-MCNC: 1.04 MG/DL — SIGNIFICANT CHANGE UP (ref 0.5–1.3)
GLUCOSE SERPL-MCNC: 86 MG/DL — SIGNIFICANT CHANGE UP (ref 70–99)
GLUCOSE UR-MCNC: NEGATIVE — SIGNIFICANT CHANGE UP
HYALINE CASTS # UR AUTO: NEGATIVE — SIGNIFICANT CHANGE UP
KETONES UR-MCNC: NEGATIVE — SIGNIFICANT CHANGE UP
LEUKOCYTE ESTERASE UR-ACNC: NEGATIVE — SIGNIFICANT CHANGE UP
LIDOCAIN IGE QN: 19.7 U/L — SIGNIFICANT CHANGE UP (ref 7–60)
NITRITE UR-MCNC: NEGATIVE — SIGNIFICANT CHANGE UP
PH UR: 6.5 — SIGNIFICANT CHANGE UP (ref 5–8)
POTASSIUM SERPL-MCNC: 3.6 MMOL/L — SIGNIFICANT CHANGE UP (ref 3.5–5.3)
POTASSIUM SERPL-SCNC: 3.6 MMOL/L — SIGNIFICANT CHANGE UP (ref 3.5–5.3)
PROT SERPL-MCNC: 6.8 G/DL — SIGNIFICANT CHANGE UP (ref 6–8.3)
PROT UR-MCNC: 10 — SIGNIFICANT CHANGE UP
RBC CASTS # UR COMP ASSIST: HIGH (ref 0–?)
SODIUM SERPL-SCNC: 136 MMOL/L — SIGNIFICANT CHANGE UP (ref 135–145)
SP GR SPEC: 1.02 — SIGNIFICANT CHANGE UP (ref 1–1.04)
SQUAMOUS # UR AUTO: SIGNIFICANT CHANGE UP
UROBILINOGEN FLD QL: NORMAL — SIGNIFICANT CHANGE UP
WBC UR QL: HIGH (ref 0–?)

## 2020-02-01 PROCEDURE — 74177 CT ABD & PELVIS W/CONTRAST: CPT | Mod: 26

## 2020-02-01 RX ORDER — SODIUM CHLORIDE 9 MG/ML
1000 INJECTION, SOLUTION INTRAVENOUS ONCE
Refills: 0 | Status: COMPLETED | OUTPATIENT
Start: 2020-02-01 | End: 2020-02-01

## 2020-02-01 RX ORDER — HYDROMORPHONE HYDROCHLORIDE 2 MG/ML
0.5 INJECTION INTRAMUSCULAR; INTRAVENOUS; SUBCUTANEOUS ONCE
Refills: 0 | Status: DISCONTINUED | OUTPATIENT
Start: 2020-02-01 | End: 2020-02-01

## 2020-02-01 RX ADMIN — SODIUM CHLORIDE 1000 MILLILITER(S): 9 INJECTION, SOLUTION INTRAVENOUS at 02:28

## 2020-02-01 RX ADMIN — HYDROMORPHONE HYDROCHLORIDE 0.5 MILLIGRAM(S): 2 INJECTION INTRAMUSCULAR; INTRAVENOUS; SUBCUTANEOUS at 02:28

## 2020-02-02 LAB
BACTERIA UR CULT: SIGNIFICANT CHANGE UP
SPECIMEN SOURCE: SIGNIFICANT CHANGE UP

## 2020-02-03 ENCOUNTER — APPOINTMENT (OUTPATIENT)
Dept: SURGERY | Facility: CLINIC | Age: 35
End: 2020-02-03

## 2020-04-25 ENCOUNTER — MESSAGE (OUTPATIENT)
Age: 35
End: 2020-04-25

## 2020-05-09 ENCOUNTER — APPOINTMENT (OUTPATIENT)
Dept: DISASTER EMERGENCY | Facility: HOSPITAL | Age: 35
End: 2020-05-09

## 2020-05-13 ENCOUNTER — APPOINTMENT (OUTPATIENT)
Dept: DISASTER EMERGENCY | Facility: HOSPITAL | Age: 35
End: 2020-05-13

## 2020-05-14 LAB
SARS-COV-2 IGG SERPL IA-ACNC: 0.1 INDEX
SARS-COV-2 IGG SERPL QL IA: NEGATIVE

## 2020-07-29 ENCOUNTER — EMERGENCY (EMERGENCY)
Facility: HOSPITAL | Age: 35
LOS: 1 days | Discharge: HOME | End: 2020-07-29
Attending: EMERGENCY MEDICINE | Admitting: EMERGENCY MEDICINE
Payer: OTHER MISCELLANEOUS

## 2020-07-29 VITALS
TEMPERATURE: 98 F | DIASTOLIC BLOOD PRESSURE: 62 MMHG | SYSTOLIC BLOOD PRESSURE: 111 MMHG | RESPIRATION RATE: 16 BRPM | HEART RATE: 86 BPM | OXYGEN SATURATION: 98 %

## 2020-07-29 DIAGNOSIS — Z98.890 OTHER SPECIFIED POSTPROCEDURAL STATES: Chronic | ICD-10-CM

## 2020-07-29 DIAGNOSIS — Z90.5 ACQUIRED ABSENCE OF KIDNEY: Chronic | ICD-10-CM

## 2020-07-29 DIAGNOSIS — Z98.89 OTHER SPECIFIED POSTPROCEDURAL STATES: Chronic | ICD-10-CM

## 2020-07-29 PROCEDURE — 99283 EMERGENCY DEPT VISIT LOW MDM: CPT

## 2020-07-29 NOTE — ED PROVIDER NOTE - PATIENT PORTAL LINK FT
You can access the FollowMyHealth Patient Portal offered by [facility] by registering at the following website: [email or web address]. By joining [employer]’s FollowMyHealth portal, you will also be able to view your health information using other applications (apps) compatible with our system.

## 2020-07-29 NOTE — ED PROVIDER NOTE - OBJECTIVE STATEMENT
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 Pt was wearing gloves, was drawing blood on the source pt with a butterfly needle, when the source pt moved, causing the pt to stick her R index finger with the needle. Cleaned the wound with alcohol and betadine. Hep B and Tetanus shots up to date. Denies any complaints, considering PEP. Source pt told the pt she was negative previously and did not want to be retested.

## 2020-07-29 NOTE — ED PROVIDER NOTE - CLINICAL SUMMARY MEDICAL DECISION MAKING FREE TEXT BOX
35 y/o F employee here w/ finger stick injury at work. Source pt claimed to be negative, but did not get retested. Spoke w/ pt risks vs., benefits of start PEP. concern for unilateral kidney and risk of renal issues as side effect of pep. Pt opted out of taking the PEP and will follow up with employee health in 3 days.

## 2020-07-29 NOTE — ED PROVIDER NOTE - NSFOLLOWUPINSTRUCTIONS_ED_ALL_ED_FT
Follow up with Employee Health Services in 2-3 days [phone].  Clean affected area with soap and water.  Return to the ER for any persistent/worsening or new symptoms fevers, chills, redness, weakness, numbness or any concerning symptoms.

## 2020-08-17 NOTE — PRE-OP CHECKLIST - PATIENT'S PERSONAL PROPERTY REMOVED
Skin tag removal by cryotherapy competed today.  1. It will be red for several days.  2. Blister may form.  3. Repeat procedure may be necessary in 2-4 weeks if lesion(s) doesn't completely resolve.     glasses

## 2020-08-28 ENCOUNTER — EMERGENCY (EMERGENCY)
Facility: HOSPITAL | Age: 35
LOS: 1 days | Discharge: LEFT BEFORE DISCHARGE | End: 2020-08-28
Admitting: EMERGENCY MEDICINE
Payer: COMMERCIAL

## 2020-08-28 VITALS
SYSTOLIC BLOOD PRESSURE: 117 MMHG | OXYGEN SATURATION: 100 % | DIASTOLIC BLOOD PRESSURE: 79 MMHG | TEMPERATURE: 98 F | HEART RATE: 84 BPM | RESPIRATION RATE: 15 BRPM

## 2020-08-28 DIAGNOSIS — Z98.890 OTHER SPECIFIED POSTPROCEDURAL STATES: Chronic | ICD-10-CM

## 2020-08-28 DIAGNOSIS — Z98.89 OTHER SPECIFIED POSTPROCEDURAL STATES: Chronic | ICD-10-CM

## 2020-08-28 DIAGNOSIS — Z90.5 ACQUIRED ABSENCE OF KIDNEY: Chronic | ICD-10-CM

## 2020-08-28 PROCEDURE — L9991: CPT

## 2020-08-28 NOTE — ED ADULT TRIAGE NOTE - CHIEF COMPLAINT QUOTE
Pt c/o hand  in her R eye, states cannot see out of eye presently. Endorses pain.  No redness noted. Denies any PMHx.

## 2020-08-28 NOTE — ED ADULT NURSE NOTE - ED STAT RN HANDOFF WHERE
pt was seem by ER tech, had eye irrigated,. upon assessment by MD pt not found int he room, pt's name paged overhead multiple times, immediate ER area searched, no reply no show. phone calls made to pt's contact information, no reply, messages left, no call back, pt considered LWBE at this time.

## 2020-08-28 NOTE — ED POST DISCHARGE NOTE - RESULT SUMMARY
Attempted to locate patient in ED and tried calling patient's contact number. Numerous unsuccessful attempts to locate patient for evaluation in ED.

## 2020-11-02 ENCOUNTER — APPOINTMENT (OUTPATIENT)
Dept: OPHTHALMOLOGY | Facility: CLINIC | Age: 35
End: 2020-11-02
Payer: COMMERCIAL

## 2020-11-02 ENCOUNTER — NON-APPOINTMENT (OUTPATIENT)
Age: 35
End: 2020-11-02

## 2020-11-02 PROCEDURE — 99072 ADDL SUPL MATRL&STAF TM PHE: CPT

## 2020-11-02 PROCEDURE — 92133 CPTRZD OPH DX IMG PST SGM ON: CPT

## 2020-11-02 PROCEDURE — 92004 COMPRE OPH EXAM NEW PT 1/>: CPT

## 2020-11-11 ENCOUNTER — APPOINTMENT (OUTPATIENT)
Dept: PLASTIC SURGERY | Facility: CLINIC | Age: 35
End: 2020-11-11
Payer: COMMERCIAL

## 2020-11-11 ENCOUNTER — TRANSCRIPTION ENCOUNTER (OUTPATIENT)
Age: 35
End: 2020-11-11

## 2020-11-11 VITALS
TEMPERATURE: 98.3 F | BODY MASS INDEX: 24.96 KG/M2 | HEIGHT: 67 IN | HEART RATE: 66 BPM | SYSTOLIC BLOOD PRESSURE: 117 MMHG | WEIGHT: 159 LBS | DIASTOLIC BLOOD PRESSURE: 73 MMHG | OXYGEN SATURATION: 98 %

## 2020-11-11 PROCEDURE — 99204 OFFICE O/P NEW MOD 45 MIN: CPT

## 2020-11-11 PROCEDURE — 99072 ADDL SUPL MATRL&STAF TM PHE: CPT

## 2020-11-18 ENCOUNTER — RESULT REVIEW (OUTPATIENT)
Age: 35
End: 2020-11-18

## 2020-11-30 ENCOUNTER — EMERGENCY (EMERGENCY)
Facility: HOSPITAL | Age: 35
LOS: 1 days | Discharge: HOME | End: 2020-11-30
Attending: EMERGENCY MEDICINE | Admitting: EMERGENCY MEDICINE
Payer: COMMERCIAL

## 2020-11-30 VITALS
RESPIRATION RATE: 17 BRPM | HEART RATE: 88 BPM | TEMPERATURE: 98 F | SYSTOLIC BLOOD PRESSURE: 107 MMHG | DIASTOLIC BLOOD PRESSURE: 64 MMHG | OXYGEN SATURATION: 100 % | HEIGHT: 67 IN

## 2020-11-30 DIAGNOSIS — Z98.890 OTHER SPECIFIED POSTPROCEDURAL STATES: Chronic | ICD-10-CM

## 2020-11-30 DIAGNOSIS — Z90.5 ACQUIRED ABSENCE OF KIDNEY: Chronic | ICD-10-CM

## 2020-11-30 DIAGNOSIS — Z98.89 OTHER SPECIFIED POSTPROCEDURAL STATES: Chronic | ICD-10-CM

## 2020-11-30 DIAGNOSIS — Z90.49 ACQUIRED ABSENCE OF OTHER SPECIFIED PARTS OF DIGESTIVE TRACT: Chronic | ICD-10-CM

## 2020-11-30 LAB
ALBUMIN SERPL ELPH-MCNC: 4 G/DL — SIGNIFICANT CHANGE UP (ref 3.3–5)
ALP SERPL-CCNC: 72 U/L — SIGNIFICANT CHANGE UP (ref 40–120)
ALT FLD-CCNC: 9 U/L — SIGNIFICANT CHANGE UP (ref 4–33)
ANION GAP SERPL CALC-SCNC: 9 MMO/L — SIGNIFICANT CHANGE UP (ref 7–14)
AST SERPL-CCNC: 12 U/L — SIGNIFICANT CHANGE UP (ref 4–32)
BASOPHILS # BLD AUTO: 0.04 K/UL — SIGNIFICANT CHANGE UP (ref 0–0.2)
BASOPHILS NFR BLD AUTO: 0.5 % — SIGNIFICANT CHANGE UP (ref 0–2)
BILIRUB SERPL-MCNC: < 0.2 MG/DL — LOW (ref 0.2–1.2)
BUN SERPL-MCNC: 16 MG/DL — SIGNIFICANT CHANGE UP (ref 7–23)
CALCIUM SERPL-MCNC: 9.2 MG/DL — SIGNIFICANT CHANGE UP (ref 8.4–10.5)
CHLORIDE SERPL-SCNC: 103 MMOL/L — SIGNIFICANT CHANGE UP (ref 98–107)
CO2 SERPL-SCNC: 25 MMOL/L — SIGNIFICANT CHANGE UP (ref 22–31)
CREAT SERPL-MCNC: 1.11 MG/DL — SIGNIFICANT CHANGE UP (ref 0.5–1.3)
D DIMER BLD IA.RAPID-MCNC: 249 NG/ML — SIGNIFICANT CHANGE UP
EOSINOPHIL # BLD AUTO: 0.07 K/UL — SIGNIFICANT CHANGE UP (ref 0–0.5)
EOSINOPHIL NFR BLD AUTO: 1 % — SIGNIFICANT CHANGE UP (ref 0–6)
GLUCOSE SERPL-MCNC: 95 MG/DL — SIGNIFICANT CHANGE UP (ref 70–99)
HCG SERPL-ACNC: < 5 MIU/ML — SIGNIFICANT CHANGE UP
HCT VFR BLD CALC: 40.2 % — SIGNIFICANT CHANGE UP (ref 34.5–45)
HGB BLD-MCNC: 12.7 G/DL — SIGNIFICANT CHANGE UP (ref 11.5–15.5)
IMM GRANULOCYTES NFR BLD AUTO: 0.3 % — SIGNIFICANT CHANGE UP (ref 0–1.5)
LYMPHOCYTES # BLD AUTO: 2.32 K/UL — SIGNIFICANT CHANGE UP (ref 1–3.3)
LYMPHOCYTES # BLD AUTO: 31.5 % — SIGNIFICANT CHANGE UP (ref 13–44)
MCHC RBC-ENTMCNC: 27.9 PG — SIGNIFICANT CHANGE UP (ref 27–34)
MCHC RBC-ENTMCNC: 31.6 % — LOW (ref 32–36)
MCV RBC AUTO: 88.2 FL — SIGNIFICANT CHANGE UP (ref 80–100)
MONOCYTES # BLD AUTO: 0.46 K/UL — SIGNIFICANT CHANGE UP (ref 0–0.9)
MONOCYTES NFR BLD AUTO: 6.3 % — SIGNIFICANT CHANGE UP (ref 2–14)
NEUTROPHILS # BLD AUTO: 4.45 K/UL — SIGNIFICANT CHANGE UP (ref 1.8–7.4)
NEUTROPHILS NFR BLD AUTO: 60.4 % — SIGNIFICANT CHANGE UP (ref 43–77)
NRBC # FLD: 0 K/UL — SIGNIFICANT CHANGE UP (ref 0–0)
PLATELET # BLD AUTO: 208 K/UL — SIGNIFICANT CHANGE UP (ref 150–400)
PMV BLD: 10.5 FL — SIGNIFICANT CHANGE UP (ref 7–13)
POTASSIUM SERPL-MCNC: 3.9 MMOL/L — SIGNIFICANT CHANGE UP (ref 3.5–5.3)
POTASSIUM SERPL-SCNC: 3.9 MMOL/L — SIGNIFICANT CHANGE UP (ref 3.5–5.3)
PROT SERPL-MCNC: 7.2 G/DL — SIGNIFICANT CHANGE UP (ref 6–8.3)
RBC # BLD: 4.56 M/UL — SIGNIFICANT CHANGE UP (ref 3.8–5.2)
RBC # FLD: 13.8 % — SIGNIFICANT CHANGE UP (ref 10.3–14.5)
SODIUM SERPL-SCNC: 137 MMOL/L — SIGNIFICANT CHANGE UP (ref 135–145)
TROPONIN T, HIGH SENSITIVITY: < 6 NG/L — SIGNIFICANT CHANGE UP (ref ?–14)
WBC # BLD: 7.36 K/UL — SIGNIFICANT CHANGE UP (ref 3.8–10.5)
WBC # FLD AUTO: 7.36 K/UL — SIGNIFICANT CHANGE UP (ref 3.8–10.5)

## 2020-11-30 PROCEDURE — 71275 CT ANGIOGRAPHY CHEST: CPT | Mod: 26

## 2020-11-30 PROCEDURE — 74177 CT ABD & PELVIS W/CONTRAST: CPT | Mod: 26

## 2020-11-30 PROCEDURE — 99284 EMERGENCY DEPT VISIT MOD MDM: CPT

## 2020-11-30 RX ORDER — KETOROLAC TROMETHAMINE 30 MG/ML
15 SYRINGE (ML) INJECTION ONCE
Refills: 0 | Status: DISCONTINUED | OUTPATIENT
Start: 2020-11-30 | End: 2020-11-30

## 2020-11-30 RX ORDER — SODIUM CHLORIDE 9 MG/ML
1000 INJECTION INTRAMUSCULAR; INTRAVENOUS; SUBCUTANEOUS ONCE
Refills: 0 | Status: COMPLETED | OUTPATIENT
Start: 2020-11-30 | End: 2020-11-30

## 2020-11-30 RX ADMIN — Medication 15 MILLIGRAM(S): at 22:35

## 2020-11-30 RX ADMIN — SODIUM CHLORIDE 1000 MILLILITER(S): 9 INJECTION INTRAMUSCULAR; INTRAVENOUS; SUBCUTANEOUS at 21:03

## 2020-11-30 NOTE — ED PROVIDER NOTE - PROGRESS NOTE DETAILS
pt no resp distress. borderline dimer, will order ct angio chest pt more comfortable. pending urine and ct to be done. Endorse to night team Mellyl Mangdomingopiero PGY1: Urine showed +leuks and WBCs, given dose of zosyn for presumed UTI. CTA Chest negative for PE. CT A/P showed L corpus luteal cyst w/ small pelvic free fluid. Discussed results with pt, and need for outpt f/u with her GYN. Pt expressed understanding. Pt given discharge instructions and return precautions prior to discharge.

## 2020-11-30 NOTE — ED ADULT NURSE NOTE - OBJECTIVE STATEMENT
Pt is a 35yr old female, A&OX4 and ambulatory, s/p LEEP with 5 days ago, complaining of palpitations, subjective fevers, and L lower abdominal pain radiating to the L shoulder starting today. Pt reports taking Tylenol at 3pm with minimal relief. Pt resp. even and unlabored, appears comfortable. Denies CP, SOB, HA, dizziness, N/V, cough, or urinary symptoms. NSR on the CM. 18g IV placed to the L AC. Labs sent. Fluids infusing. NAD. Will continue to monitor.

## 2020-11-30 NOTE — ED PROVIDER NOTE - PHYSICAL EXAMINATION
PHYSICAL EXAM:  GENERAL: non-toxic appearing; in no respiratory distress  HEENT: Atraumatic, Normocephalic, PERRL, EOMs intact b/l w/out deficits, no conjunctival pallor, MMM  NECK: No JVD; FROM  CHEST/LUNG: CTAB no wheezes/rhonchi/rales  HEART: RRR no murmur/gallops/rubs  ABDOMEN: +BS, soft, ND, +LLQ abd TTP, w/o rebound/guarding  EXTREMITIES: No LE edema, +2 radial pulses b/l, +2 DP/PT pulses b/l  MUSCULOSKELETAL: FROM of all 4 extremities  NERVOUS SYSTEM:  A&Ox3, No motor deficits or sensory deficits; no focal neurologic deficits  SKIN:  No new rashes

## 2020-11-30 NOTE — ED PROVIDER NOTE - NS ED ROS FT
Gen: Denies weight loss; +fever  HEENT: Denies vision changes, ear pain, epistaxis, sore throat  CV: +chest pain, +palpitations  Skin: Denies rash, erythema, color changes  Resp: Denies SOB, cough  Endo: Denies sensitivity to heat, cold, increased urination  GI: Denies constipation, nausea, vomiting, diarrhea; +abdominal pain  Msk: Denies LE swelling, extremity pain; +back pain  : Denies dysuria, increased frequency; +vaginal discharge  Neuro: Denies LOC, weakness, numbness, tingling  Psych: Denies hx of psych, hallucinations  ROS statement: all other ROS negative except as per HPI

## 2020-11-30 NOTE — ED PROVIDER NOTE - PATIENT PORTAL LINK FT
You can access the FollowMyHealth Patient Portal offered by [facility] by registering at the following website: [email or web address]. By joining [email or web address]’s FollowMyHealth portal, you will also be able to view your health information using other applications (apps) compatible with our system.

## 2020-11-30 NOTE — ED PROVIDER NOTE - OBJECTIVE STATEMENT
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 Pt took Tylenol earlier w/o relief of pain. Pt notes that she is having some vaginal discharge which her surgeon states is normal s/p the procedure. Pt notes that on an TVUS she got prior to her LEEP, they noted a L hemorrhagic cyst. Pt denies SOB, dizziness/lightheadedness, HA, n/v/d, cough, urinary sx, or any other symptoms at this time.

## 2020-11-30 NOTE — ED PROVIDER NOTE - CLINICAL SUMMARY MEDICAL DECISION MAKING FREE TEXT BOX
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  Afebrile here in ED. VSS. Physical exam significant for LLQ abd TTP.  EKG shows NSR.  Will check labs, trop, d-dimer, UA, and CT A/P. Reassess.

## 2020-11-30 NOTE — ED PROVIDER NOTE - PSH
H/O abdominoplasty    H/O  section    H/O LEEP    H/O unilateral nephrectomy  left, elective for donation  History of appendectomy

## 2020-11-30 NOTE — ED PROVIDER NOTE - NSFOLLOWUPINSTRUCTIONS_ED_ALL_ED_FT
Urinary Tract Infection    Urinary tract infection (UTI) is an infection of any part of the urinary tract, which includes the kidneys, ureters, bladder, and urethra. These organs make, store, and get rid of urine in the body. UTI can be a bladder infection (cystitis) or kidney infection (pyelonephritis).    You are being sent a prescription for Keflex (Cephalexin), please take as prescribed. Make sure to drink plenty of fluids at home.    Please make appointment to follow up with your GYN for the ovarian cyst at your earliest convenience.     Please return to ED if you develop worsening abdominal pain, chest pain, SOB, fevers, or any other concerning symptoms.

## 2020-12-01 VITALS
TEMPERATURE: 98 F | HEART RATE: 72 BPM | OXYGEN SATURATION: 100 % | RESPIRATION RATE: 18 BRPM | DIASTOLIC BLOOD PRESSURE: 68 MMHG | SYSTOLIC BLOOD PRESSURE: 111 MMHG

## 2020-12-01 LAB
APPEARANCE UR: SIGNIFICANT CHANGE UP
BILIRUB UR-MCNC: NEGATIVE — SIGNIFICANT CHANGE UP
BLOOD UR QL VISUAL: HIGH
COLOR SPEC: SIGNIFICANT CHANGE UP
CULTURE RESULTS: SIGNIFICANT CHANGE UP
GLUCOSE UR-MCNC: NEGATIVE — SIGNIFICANT CHANGE UP
KETONES UR-MCNC: NEGATIVE — SIGNIFICANT CHANGE UP
LEUKOCYTE ESTERASE UR-ACNC: SIGNIFICANT CHANGE UP
NITRITE UR-MCNC: NEGATIVE — SIGNIFICANT CHANGE UP
PH UR: 6 — SIGNIFICANT CHANGE UP (ref 5–8)
PROT UR-MCNC: 30 — SIGNIFICANT CHANGE UP
RBC CASTS # UR COMP ASSIST: SIGNIFICANT CHANGE UP (ref 0–?)
SP GR SPEC: 1.02 — SIGNIFICANT CHANGE UP (ref 1–1.04)
SPECIMEN SOURCE: SIGNIFICANT CHANGE UP
SQUAMOUS # UR AUTO: SIGNIFICANT CHANGE UP
UROBILINOGEN FLD QL: NORMAL — SIGNIFICANT CHANGE UP
WBC UR QL: HIGH (ref 0–?)

## 2020-12-01 RX ORDER — CEPHALEXIN 500 MG
1 CAPSULE ORAL
Qty: 14 | Refills: 0
Start: 2020-12-01 | End: 2020-12-07

## 2020-12-01 RX ORDER — SODIUM CHLORIDE 9 MG/ML
1000 INJECTION INTRAMUSCULAR; INTRAVENOUS; SUBCUTANEOUS ONCE
Refills: 0 | Status: COMPLETED | OUTPATIENT
Start: 2020-12-01 | End: 2020-12-01

## 2020-12-01 RX ORDER — PIPERACILLIN AND TAZOBACTAM 4; .5 G/20ML; G/20ML
3.38 INJECTION, POWDER, LYOPHILIZED, FOR SOLUTION INTRAVENOUS ONCE
Refills: 0 | Status: COMPLETED | OUTPATIENT
Start: 2020-12-01 | End: 2020-12-01

## 2020-12-01 RX ADMIN — PIPERACILLIN AND TAZOBACTAM 200 GRAM(S): 4; .5 INJECTION, POWDER, LYOPHILIZED, FOR SOLUTION INTRAVENOUS at 01:13

## 2020-12-01 RX ADMIN — SODIUM CHLORIDE 1000 MILLILITER(S): 9 INJECTION INTRAMUSCULAR; INTRAVENOUS; SUBCUTANEOUS at 00:16

## 2020-12-01 NOTE — ED POST DISCHARGE NOTE - REASON FOR FOLLOW-UP
Other Patient called at [facility] and her RX not at pharmacy. RX went to [facility]. I re-erxd to Patient's preferred pharmacy.

## 2020-12-10 ENCOUNTER — EMERGENCY (EMERGENCY)
Facility: HOSPITAL | Age: 35
LOS: 1 days | Discharge: HOME | End: 2020-12-10
Attending: STUDENT IN AN ORGANIZED HEALTH CARE EDUCATION/TRAINING PROGRAM
Payer: COMMERCIAL

## 2020-12-10 ENCOUNTER — APPOINTMENT (OUTPATIENT)
Dept: HUMAN REPRODUCTION | Facility: CLINIC | Age: 35
End: 2020-12-10
Payer: COMMERCIAL

## 2020-12-10 VITALS
DIASTOLIC BLOOD PRESSURE: 73 MMHG | SYSTOLIC BLOOD PRESSURE: 110 MMHG | RESPIRATION RATE: 16 BRPM | OXYGEN SATURATION: 99 % | HEIGHT: 67 IN | TEMPERATURE: 99 F | HEART RATE: 78 BPM

## 2020-12-10 VITALS
TEMPERATURE: 98 F | SYSTOLIC BLOOD PRESSURE: 105 MMHG | DIASTOLIC BLOOD PRESSURE: 69 MMHG | HEART RATE: 70 BPM | OXYGEN SATURATION: 100 % | RESPIRATION RATE: 16 BRPM

## 2020-12-10 DIAGNOSIS — Z90.49 ACQUIRED ABSENCE OF OTHER SPECIFIED PARTS OF DIGESTIVE TRACT: Chronic | ICD-10-CM

## 2020-12-10 DIAGNOSIS — Z90.5 ACQUIRED ABSENCE OF KIDNEY: Chronic | ICD-10-CM

## 2020-12-10 DIAGNOSIS — Z98.89 OTHER SPECIFIED POSTPROCEDURAL STATES: Chronic | ICD-10-CM

## 2020-12-10 DIAGNOSIS — Z98.890 OTHER SPECIFIED POSTPROCEDURAL STATES: Chronic | ICD-10-CM

## 2020-12-10 LAB
ALBUMIN SERPL ELPH-MCNC: 4 G/DL — SIGNIFICANT CHANGE UP (ref 3.3–5)
ALP SERPL-CCNC: 71 U/L — SIGNIFICANT CHANGE UP (ref 40–120)
ALT FLD-CCNC: 9 U/L — SIGNIFICANT CHANGE UP (ref 4–33)
ANION GAP SERPL CALC-SCNC: 8 MMOL/L — SIGNIFICANT CHANGE UP (ref 7–14)
APPEARANCE UR: CLEAR — SIGNIFICANT CHANGE UP
APTT BLD: 31.6 SEC — SIGNIFICANT CHANGE UP (ref 27–36.3)
AST SERPL-CCNC: 11 U/L — SIGNIFICANT CHANGE UP (ref 4–32)
BASOPHILS # BLD AUTO: 0.02 K/UL — SIGNIFICANT CHANGE UP (ref 0–0.2)
BASOPHILS NFR BLD AUTO: 0.3 % — SIGNIFICANT CHANGE UP (ref 0–2)
BILIRUB SERPL-MCNC: <0.2 MG/DL — SIGNIFICANT CHANGE UP (ref 0.2–1.2)
BILIRUB UR-MCNC: NEGATIVE — SIGNIFICANT CHANGE UP
BLD GP AB SCN SERPL QL: NEGATIVE — SIGNIFICANT CHANGE UP
BUN SERPL-MCNC: 13 MG/DL — SIGNIFICANT CHANGE UP (ref 7–23)
CALCIUM SERPL-MCNC: 9.4 MG/DL — SIGNIFICANT CHANGE UP (ref 8.4–10.5)
CHLORIDE SERPL-SCNC: 103 MMOL/L — SIGNIFICANT CHANGE UP (ref 98–107)
CO2 SERPL-SCNC: 28 MMOL/L — SIGNIFICANT CHANGE UP (ref 22–31)
COLOR SPEC: COLORLESS — SIGNIFICANT CHANGE UP
CREAT SERPL-MCNC: 1.06 MG/DL — SIGNIFICANT CHANGE UP (ref 0.5–1.3)
DIFF PNL FLD: ABNORMAL
EOSINOPHIL # BLD AUTO: 0.03 K/UL — SIGNIFICANT CHANGE UP (ref 0–0.5)
EOSINOPHIL NFR BLD AUTO: 0.5 % — SIGNIFICANT CHANGE UP (ref 0–6)
GLUCOSE SERPL-MCNC: 83 MG/DL — SIGNIFICANT CHANGE UP (ref 70–99)
GLUCOSE UR QL: NEGATIVE — SIGNIFICANT CHANGE UP
HCT VFR BLD CALC: 37.3 % — SIGNIFICANT CHANGE UP (ref 34.5–45)
HGB BLD-MCNC: 11.9 G/DL — SIGNIFICANT CHANGE UP (ref 11.5–15.5)
IANC: 3.44 K/UL — SIGNIFICANT CHANGE UP (ref 1.5–8.5)
IMM GRANULOCYTES NFR BLD AUTO: 0.3 % — SIGNIFICANT CHANGE UP (ref 0–1.5)
INR BLD: 1.1 RATIO — SIGNIFICANT CHANGE UP (ref 0.88–1.17)
KETONES UR-MCNC: NEGATIVE — SIGNIFICANT CHANGE UP
LEUKOCYTE ESTERASE UR-ACNC: ABNORMAL
LYMPHOCYTES # BLD AUTO: 2.07 K/UL — SIGNIFICANT CHANGE UP (ref 1–3.3)
LYMPHOCYTES # BLD AUTO: 34.7 % — SIGNIFICANT CHANGE UP (ref 13–44)
MCHC RBC-ENTMCNC: 28.3 PG — SIGNIFICANT CHANGE UP (ref 27–34)
MCHC RBC-ENTMCNC: 31.9 GM/DL — LOW (ref 32–36)
MCV RBC AUTO: 88.6 FL — SIGNIFICANT CHANGE UP (ref 80–100)
MONOCYTES # BLD AUTO: 0.39 K/UL — SIGNIFICANT CHANGE UP (ref 0–0.9)
MONOCYTES NFR BLD AUTO: 6.5 % — SIGNIFICANT CHANGE UP (ref 2–14)
NEUTROPHILS # BLD AUTO: 3.44 K/UL — SIGNIFICANT CHANGE UP (ref 1.8–7.4)
NEUTROPHILS NFR BLD AUTO: 57.7 % — SIGNIFICANT CHANGE UP (ref 43–77)
NITRITE UR-MCNC: NEGATIVE — SIGNIFICANT CHANGE UP
NRBC # BLD: 0 /100 WBCS — SIGNIFICANT CHANGE UP
NRBC # FLD: 0 K/UL — SIGNIFICANT CHANGE UP
PH UR: 6 — SIGNIFICANT CHANGE UP (ref 5–8)
PLATELET # BLD AUTO: 236 K/UL — SIGNIFICANT CHANGE UP (ref 150–400)
POTASSIUM SERPL-MCNC: 3.9 MMOL/L — SIGNIFICANT CHANGE UP (ref 3.5–5.3)
POTASSIUM SERPL-SCNC: 3.9 MMOL/L — SIGNIFICANT CHANGE UP (ref 3.5–5.3)
PROT SERPL-MCNC: 6.9 G/DL — SIGNIFICANT CHANGE UP (ref 6–8.3)
PROT UR-MCNC: ABNORMAL
PROTHROM AB SERPL-ACNC: 12.6 SEC — SIGNIFICANT CHANGE UP (ref 9.8–13.1)
RBC # BLD: 4.21 M/UL — SIGNIFICANT CHANGE UP (ref 3.8–5.2)
RBC # FLD: 13.9 % — SIGNIFICANT CHANGE UP (ref 10.3–14.5)
RH IG SCN BLD-IMP: POSITIVE — SIGNIFICANT CHANGE UP
SODIUM SERPL-SCNC: 139 MMOL/L — SIGNIFICANT CHANGE UP (ref 135–145)
SP GR SPEC: 1.02 — SIGNIFICANT CHANGE UP (ref 1.01–1.02)
UROBILINOGEN FLD QL: SIGNIFICANT CHANGE UP
WBC # BLD: 5.97 K/UL — SIGNIFICANT CHANGE UP (ref 3.8–10.5)
WBC # FLD AUTO: 5.97 K/UL — SIGNIFICANT CHANGE UP (ref 3.8–10.5)

## 2020-12-10 PROCEDURE — 99203 OFFICE O/P NEW LOW 30 MIN: CPT

## 2020-12-10 PROCEDURE — 99284 EMERGENCY DEPT VISIT MOD MDM: CPT

## 2020-12-10 PROCEDURE — 99072 ADDL SUPL MATRL&STAF TM PHE: CPT

## 2020-12-10 PROCEDURE — 76830 TRANSVAGINAL US NON-OB: CPT | Mod: 26

## 2020-12-10 RX ORDER — CEFTRIAXONE 500 MG/1
250 INJECTION, POWDER, FOR SOLUTION INTRAMUSCULAR; INTRAVENOUS ONCE
Refills: 0 | Status: COMPLETED | OUTPATIENT
Start: 2020-12-10 | End: 2020-12-10

## 2020-12-10 RX ORDER — IBUPROFEN 200 MG
800 TABLET ORAL ONCE
Refills: 0 | Status: COMPLETED | OUTPATIENT
Start: 2020-12-10 | End: 2020-12-10

## 2020-12-10 RX ADMIN — Medication 800 MILLIGRAM(S): at 22:25

## 2020-12-10 RX ADMIN — Medication 100 MILLIGRAM(S): at 22:24

## 2020-12-10 RX ADMIN — CEFTRIAXONE 250 MILLIGRAM(S): 500 INJECTION, POWDER, FOR SOLUTION INTRAMUSCULAR; INTRAVENOUS at 22:40

## 2020-12-10 NOTE — ED PROVIDER NOTE - NSFOLLOWUPINSTRUCTIONS_ED_ALL_ED_FT
Please follow up with your primary care provider for further concerns you may have regarding your general health. Attached you will find your results from today's visit. Continue taking your medications as prescribed and keep your upcoming medical appointments.    Please read the attached handout on pelvic inflammatory disease and follow up with your OBGYN in the next several days, if possible.    You will be texted with the result of your covid test.

## 2020-12-10 NOTE — ED PROVIDER NOTE - CLINICAL SUMMARY MEDICAL DECISION MAKING FREE TEXT BOX
Pt is a 34 y/o F PMHx kidney donation, LEEP procedure 11/23 p/w vaginal bleeding x 6 days. -- vaginal bleeding, r/o anemia, possible UTI, not clinically concerning for appendicitis -- labs, ua, ucx, transvaginal sono

## 2020-12-10 NOTE — ED ADULT NURSE NOTE - OBJECTIVE STATEMENT
Receive pt. in ER room 18 alert and oriented x 4 Receive pt. in ER room 18 alert and oriented x 4, presenting to the ER with complaints of vaginal bleeding. Pt. have a history of cerv Receive pt. in ER room 18 alert and oriented x 4, presenting to the ER with complaints of vaginal bleeding. Pt. have a history of cervical and ovarian cancer. Pt. stated " I have bleeding from my vagina for 6 days and I also have pain in my vagina". Place on cardiac monitor, labs send

## 2020-12-10 NOTE — ED PROVIDER NOTE - OBJECTIVE STATEMENT
Pt is a 34 y/o F PMHx kidney donation, LEEP procedure 11/23 p/w vaginal bleeding x 6 days.  Pt states after having LEEP procedure, pt did not have any post procedure vaginal bleeding, but had some vaginal discharge, which pt was assured by her ob/gyn was normal.  She states she began to have vaginal bleeding for past 6 days associated with mid pelvic cramping pain.  Pt states she required 7-8 pads/day.  Today she began to feel lightheaded and found to have a fever of Tmax 101.4 F.  She notes lightheaded would worsen upon standing up.  She states she took tylenol today.  Pt denies any nausea, vomiting, sore throat, chest pain, SOB, diarrhea, dysuria, cloudy urine, headache, rash, syncope or any other specific complaints. Pt is a 34 y/o F PMHx kidney donation, LEEP procedure  p/w vaginal bleeding x 6 days.  Pt states after having LEEP procedure, pt did not have any post procedure vaginal bleeding, but had some vaginal discharge, which pt was assured by her ob/gyn was normal.  She states she began to have vaginal bleeding for past 6 days associated with mid pelvic cramping pain, which describes as feeling as though she delivering a baby and the baby is .  Pt states she required 7-8 pads/day.  Today she began to feel lightheaded and found to have a fever of Tmax 101.4 F.  She notes lightheaded would worsen upon standing up.  She states she took tylenol today.  Pt denies any nausea, vomiting, sore throat, chest pain, SOB, diarrhea, dysuria, cloudy urine, headache, rash, syncope or any other specific complaints.

## 2020-12-10 NOTE — ED ADULT TRIAGE NOTE - CHIEF COMPLAINT QUOTE
pt had LEEP procedure done 11/23 c/o increased vaginal bleeding with clots that started on Friday. Pt endorsing lightheadedness/dizziness. Pt has had fevers but has been taking tylenol. Appears comfortable.

## 2020-12-10 NOTE — ED PROVIDER NOTE - PROGRESS NOTE DETAILS
darinel: d/w patient, will treat for PID given recent intravaginal instrumentation, tenderness on TVUS w/ L sided adnexal tenderness (nl TVUS). Pt given ceftriaxone im in ed, first dose of doxy.

## 2020-12-10 NOTE — ED PROVIDER NOTE - ATTENDING CONTRIBUTION TO CARE
Charlie BRAVO: I agree with the above provided history and exam and addend/modify it as follows.    35F w/ pmh L nephrectomy (donation), s/p LEEP 11/23 (for abnormal cervical tissue) - p/w vag bleeding w/ clots starting 6 days ago, x 7-8 pads per day, +pelvic pain, reports lightheadedness. Pt was seen in an obgyn office today. Reports fever today 101.4F, took tylenol today. No AC. Treated for UTI 9 days ago, had largely nondiagnostic CTAP at that time. No other complaints.    On exam R adnexal TTP, low pelvic TTP abd otherwise soft nontender    Plan to check labs,  US; concern for possible PID, will reassess    I Darwin Guerra MD performed a history and physical exam of the patient and discussed their management with the resident and /or advanced care provider. I reviewed the resident and /or ACP's note and agree with the documented findings and plan of care. My medical decision making and observations are found above.

## 2020-12-10 NOTE — ED PROVIDER NOTE - PATIENT PORTAL LINK FT
You can access the FollowMyHealth Patient Portal offered by [facility] by registering at the following website: [email or web address]. By joining [employer]’s FollowMyHealth portal, you will also be able to view your health information using other applications (apps) compatible with our system.

## 2020-12-11 LAB
CULTURE RESULTS: SIGNIFICANT CHANGE UP
SPECIMEN SOURCE: SIGNIFICANT CHANGE UP

## 2020-12-11 NOTE — ED POST DISCHARGE NOTE - RESULT SUMMARY
TERRY Coe: Pt called, upset that her COVID test was lost, unsure who told the pt that information. Called central and spoke with the lab supervisor, who looked for the sample w/o success. Called pt back, apologized for her experience. Told pt there is free COVID testing at [address] [place] [address] for employees now.

## 2021-01-12 NOTE — ED ADULT NURSE NOTE - NSFALLRSKASSESSTYPE_ED_ALL_ED
CONSTITUTIONAL: Well-appearing; well-nourished; in no apparent distress.   	HEAD: Normocephalic; atraumatic.   	EYES:  clear bilaterally  ENT: airway patent  Resp breathing comfortably with no distress  PSYCHOLOGICAL: The patient’s mood and manner are appropriate.
Initial (On Arrival)

## 2021-02-25 ENCOUNTER — APPOINTMENT (OUTPATIENT)
Dept: PLASTIC SURGERY | Facility: CLINIC | Age: 36
End: 2021-02-25
Payer: COMMERCIAL

## 2021-02-25 VITALS
SYSTOLIC BLOOD PRESSURE: 95 MMHG | HEIGHT: 67 IN | DIASTOLIC BLOOD PRESSURE: 60 MMHG | HEART RATE: 69 BPM | BODY MASS INDEX: 25.11 KG/M2 | OXYGEN SATURATION: 98 % | WEIGHT: 160 LBS | TEMPERATURE: 98 F

## 2021-02-25 PROCEDURE — XXXXX: CPT

## 2021-02-26 ENCOUNTER — OUTPATIENT (OUTPATIENT)
Dept: OUTPATIENT SERVICES | Facility: HOSPITAL | Age: 36
LOS: 1 days | End: 2021-02-26
Payer: SELF-PAY

## 2021-02-26 VITALS
RESPIRATION RATE: 16 BRPM | HEIGHT: 67 IN | DIASTOLIC BLOOD PRESSURE: 69 MMHG | OXYGEN SATURATION: 99 % | SYSTOLIC BLOOD PRESSURE: 107 MMHG | TEMPERATURE: 98 F | HEART RATE: 68 BPM | WEIGHT: 160.06 LBS

## 2021-02-26 DIAGNOSIS — Z98.89 OTHER SPECIFIED POSTPROCEDURAL STATES: Chronic | ICD-10-CM

## 2021-02-26 DIAGNOSIS — Z98.890 OTHER SPECIFIED POSTPROCEDURAL STATES: Chronic | ICD-10-CM

## 2021-02-26 DIAGNOSIS — E88.1 LIPODYSTROPHY, NOT ELSEWHERE CLASSIFIED: ICD-10-CM

## 2021-02-26 DIAGNOSIS — Z90.5 ACQUIRED ABSENCE OF KIDNEY: Chronic | ICD-10-CM

## 2021-02-26 DIAGNOSIS — M25.511 PAIN IN RIGHT SHOULDER: ICD-10-CM

## 2021-02-26 DIAGNOSIS — N62 HYPERTROPHY OF BREAST: ICD-10-CM

## 2021-02-26 DIAGNOSIS — Z90.49 ACQUIRED ABSENCE OF OTHER SPECIFIED PARTS OF DIGESTIVE TRACT: Chronic | ICD-10-CM

## 2021-02-26 DIAGNOSIS — Z01.818 ENCOUNTER FOR OTHER PREPROCEDURAL EXAMINATION: ICD-10-CM

## 2021-02-26 LAB
ANION GAP SERPL CALC-SCNC: 11 MMOL/L — SIGNIFICANT CHANGE UP (ref 5–17)
BUN SERPL-MCNC: 20 MG/DL — SIGNIFICANT CHANGE UP (ref 7–23)
CALCIUM SERPL-MCNC: 9.7 MG/DL — SIGNIFICANT CHANGE UP (ref 8.4–10.5)
CHLORIDE SERPL-SCNC: 105 MMOL/L — SIGNIFICANT CHANGE UP (ref 96–108)
CO2 SERPL-SCNC: 25 MMOL/L — SIGNIFICANT CHANGE UP (ref 22–31)
CREAT SERPL-MCNC: 1.14 MG/DL — SIGNIFICANT CHANGE UP (ref 0.5–1.3)
GLUCOSE SERPL-MCNC: 86 MG/DL — SIGNIFICANT CHANGE UP (ref 70–99)
HCT VFR BLD CALC: 41.2 % — SIGNIFICANT CHANGE UP (ref 34.5–45)
HGB BLD-MCNC: 13.2 G/DL — SIGNIFICANT CHANGE UP (ref 11.5–15.5)
MCHC RBC-ENTMCNC: 28.1 PG — SIGNIFICANT CHANGE UP (ref 27–34)
MCHC RBC-ENTMCNC: 32 GM/DL — SIGNIFICANT CHANGE UP (ref 32–36)
MCV RBC AUTO: 87.8 FL — SIGNIFICANT CHANGE UP (ref 80–100)
NRBC # BLD: 0 /100 WBCS — SIGNIFICANT CHANGE UP (ref 0–0)
PLATELET # BLD AUTO: 224 K/UL — SIGNIFICANT CHANGE UP (ref 150–400)
POTASSIUM SERPL-MCNC: 4.2 MMOL/L — SIGNIFICANT CHANGE UP (ref 3.5–5.3)
POTASSIUM SERPL-SCNC: 4.2 MMOL/L — SIGNIFICANT CHANGE UP (ref 3.5–5.3)
RBC # BLD: 4.69 M/UL — SIGNIFICANT CHANGE UP (ref 3.8–5.2)
RBC # FLD: 13.2 % — SIGNIFICANT CHANGE UP (ref 10.3–14.5)
SODIUM SERPL-SCNC: 141 MMOL/L — SIGNIFICANT CHANGE UP (ref 135–145)
WBC # BLD: 3.92 K/UL — SIGNIFICANT CHANGE UP (ref 3.8–10.5)
WBC # FLD AUTO: 3.92 K/UL — SIGNIFICANT CHANGE UP (ref 3.8–10.5)

## 2021-02-26 PROCEDURE — 80048 BASIC METABOLIC PNL TOTAL CA: CPT

## 2021-02-26 PROCEDURE — 85027 COMPLETE CBC AUTOMATED: CPT

## 2021-02-26 PROCEDURE — G0463: CPT

## 2021-02-26 RX ORDER — SODIUM CHLORIDE 9 MG/ML
3 INJECTION INTRAMUSCULAR; INTRAVENOUS; SUBCUTANEOUS EVERY 8 HOURS
Refills: 0 | Status: DISCONTINUED | OUTPATIENT
Start: 2021-03-15 | End: 2021-03-29

## 2021-02-26 RX ORDER — TRAMADOL HYDROCHLORIDE 50 MG/1
1 TABLET ORAL
Qty: 0 | Refills: 0 | DISCHARGE

## 2021-02-26 RX ORDER — LIDOCAINE HCL 20 MG/ML
0.2 VIAL (ML) INJECTION ONCE
Refills: 0 | Status: DISCONTINUED | OUTPATIENT
Start: 2021-03-15 | End: 2021-03-29

## 2021-02-26 NOTE — H&P PST ADULT - HEALTH CARE MAINTENANCE
maderna #1  #2 3/3   denies flu vaccine Covid vaccine Moderna #1, scheduled for second dose on  3/3   denies flu vaccine

## 2021-02-26 NOTE — H&P PST ADULT - HISTORY OF PRESENT ILLNESS
31 year old female with no PMH and PSH of elective left nephrectomy,  x1, b/l knee arthroscopies, and ovarian cystectomy presents to ED complaining of 10/10 constant epigastric abdominal pain that radiates to the RLQ and around the back and began today. Patient states she first had nausea/ vomiting/ and diarrhea starting last week, but she thought it was just a stomach bug. Patient states that today at work a Dr. took her temperature and it was 103.6 and that is why she finally came to the ED. Denies Chest pain, SOB, and dysuria. 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 LMP was 17. Patient is a mother of 3 kids and her son is sick with strep throat. Last meal was chili at around 10AM. CT done in ED was equivocal for early tip appendicitis.  35 year old female, RN @ [employer], kidney donor ( 2012), with history of ovarian cancer > 10 yr ago, chemo, ovarian cystectomy 2016, patient was recently diagnosed with cervical cancer, s/p LEEP procedure 11/2020, planning hysterectomy in the future, abnormal breast mammogram 12/2020, s/p bilateral breast biopsies, as per patient benign pathology.  Patient denies oncology follow up, followed by [facility] [place]. Patient presents to PST for scheduled bilateral breast reduction mammaplasty possible liposuction on 3/15/2021. Denies fever, chills, no acute complaints. Denies sick contacts. Covid PCR- patient will schedule 3 days prior procedure.     *** PCP clearance needed as per Dr. Gabriel due to recent abnormal mammogram ***.

## 2021-02-26 NOTE — H&P PST ADULT - NSICDXPASTMEDICALHX_GEN_ALL_CORE_FT
PAST MEDICAL HISTORY:  Cervical cancer 11/2020 , no chemo , no radiation , plan hysteretomy in the future    Kidney donor 2012    Ovarian cancer x 10 years   Chemo x 6 month     PAST MEDICAL HISTORY:  Cervical cancer abnormal pap , s/p LEEP  11/2020 , no chemo , no radiation , denies oncologist , pt  planning  hysteretomy in the future    Hypertrophy of breast     Kidney donor 2012 stable renal function    Mammogram abnormal s/p breast bx of both breasts 12/2020, as per patient - path was benign    Ovarian cancer x 10 years   Chemo x 6 month

## 2021-02-26 NOTE — H&P PST ADULT - LAST ECHOCARDIOGRAM
2 years ago  [phone]  normal  [employer] 2 years ago  [phone] @ [facility] , as per patient  normal  cardiac evaluation  *** ECHO 10/11/2016 @ [facility] normal EF, grade II diastolic dysfunction ***

## 2021-02-26 NOTE — H&P PST ADULT - LAST STRESS TEST
2 yr ago 2 yr ago as per patient normal  ( EKG 12/10/2020 @ [facility] pt was in ER due to bleeding after LEEP procedure, all resolved  )

## 2021-02-26 NOTE — H&P PST ADULT - NSICDXPASTSURGICALHX_GEN_ALL_CORE_FT
PAST SURGICAL HISTORY:  H/O abdominoplasty 2018    H/O arthroscopy of knee bilateral    H/O  section     H/O LEEP 2020    H/O unilateral nephrectomy left, elective for donation     History of appendectomy 2017    History of ovarian cystectomy 2016    S/P LASIK surgery 2020

## 2021-02-26 NOTE — H&P PST ADULT - PRIMARY CARE PROVIDER
formerly [facility] [phone] [facility] [phone] will schedule preop appointment as per Dr. Gabriel

## 2021-02-26 NOTE — H&P PST ADULT - NSICDXPROBLEM_GEN_ALL_CORE_FT
PROBLEM DIAGNOSES  Problem: Hypertrophy of breast  Assessment and Plan: bilateral breast reduction mammaplsty, possible liposuction   PST instructions provided, surgical scrub given, patient verbalized understanding.   CBC, BMP collected and sent   UCG on admission   Covid PCR -pt will schedule 3 days prior surgery   Pending PCP evaluation , patient will schedule

## 2021-03-08 PROBLEM — C56.9 MALIGNANT NEOPLASM OF UNSPECIFIED OVARY: Chronic | Status: ACTIVE | Noted: 2017-04-27

## 2021-03-08 PROBLEM — R92.8 OTHER ABNORMAL AND INCONCLUSIVE FINDINGS ON DIAGNOSTIC IMAGING OF BREAST: Chronic | Status: ACTIVE | Noted: 2021-02-26

## 2021-03-08 PROBLEM — C53.9 MALIGNANT NEOPLASM OF CERVIX UTERI, UNSPECIFIED: Chronic | Status: ACTIVE | Noted: 2020-11-30

## 2021-03-08 PROBLEM — Z52.4 KIDNEY DONOR: Chronic | Status: ACTIVE | Noted: 2017-04-27

## 2021-03-08 PROBLEM — N62 HYPERTROPHY OF BREAST: Chronic | Status: ACTIVE | Noted: 2021-02-26

## 2021-03-11 DIAGNOSIS — Z01.818 ENCOUNTER FOR OTHER PREPROCEDURAL EXAMINATION: ICD-10-CM

## 2021-03-12 ENCOUNTER — APPOINTMENT (OUTPATIENT)
Dept: DISASTER EMERGENCY | Facility: CLINIC | Age: 36
End: 2021-03-12

## 2021-03-13 LAB — SARS-COV-2 N GENE NPH QL NAA+PROBE: NOT DETECTED

## 2021-03-14 ENCOUNTER — TRANSCRIPTION ENCOUNTER (OUTPATIENT)
Age: 36
End: 2021-03-14

## 2021-03-15 ENCOUNTER — APPOINTMENT (OUTPATIENT)
Dept: PLASTIC SURGERY | Facility: HOSPITAL | Age: 36
End: 2021-03-15
Payer: COMMERCIAL

## 2021-03-15 ENCOUNTER — RESULT REVIEW (OUTPATIENT)
Age: 36
End: 2021-03-15

## 2021-03-15 ENCOUNTER — OUTPATIENT (OUTPATIENT)
Dept: OUTPATIENT SERVICES | Facility: HOSPITAL | Age: 36
LOS: 1 days | Discharge: HOME | End: 2021-03-15
Payer: COMMERCIAL

## 2021-03-15 VITALS
WEIGHT: 160.06 LBS | DIASTOLIC BLOOD PRESSURE: 63 MMHG | HEART RATE: 72 BPM | TEMPERATURE: 97 F | SYSTOLIC BLOOD PRESSURE: 100 MMHG | HEIGHT: 67 IN | RESPIRATION RATE: 15 BRPM | OXYGEN SATURATION: 98 %

## 2021-03-15 VITALS — SYSTOLIC BLOOD PRESSURE: 91 MMHG | DIASTOLIC BLOOD PRESSURE: 56 MMHG | HEART RATE: 78 BPM | OXYGEN SATURATION: 93 %

## 2021-03-15 DIAGNOSIS — Z90.49 ACQUIRED ABSENCE OF OTHER SPECIFIED PARTS OF DIGESTIVE TRACT: Chronic | ICD-10-CM

## 2021-03-15 DIAGNOSIS — Z98.890 OTHER SPECIFIED POSTPROCEDURAL STATES: Chronic | ICD-10-CM

## 2021-03-15 DIAGNOSIS — N62 HYPERTROPHY OF BREAST: ICD-10-CM

## 2021-03-15 DIAGNOSIS — Z01.818 ENCOUNTER FOR OTHER PREPROCEDURAL EXAMINATION: ICD-10-CM

## 2021-03-15 DIAGNOSIS — E88.1 LIPODYSTROPHY, NOT ELSEWHERE CLASSIFIED: ICD-10-CM

## 2021-03-15 DIAGNOSIS — Z98.89 OTHER SPECIFIED POSTPROCEDURAL STATES: Chronic | ICD-10-CM

## 2021-03-15 DIAGNOSIS — M25.511 PAIN IN RIGHT SHOULDER: ICD-10-CM

## 2021-03-15 DIAGNOSIS — Z90.5 ACQUIRED ABSENCE OF KIDNEY: Chronic | ICD-10-CM

## 2021-03-15 PROCEDURE — 88305 TISSUE EXAM BY PATHOLOGIST: CPT

## 2021-03-15 PROCEDURE — 19318 BREAST REDUCTION: CPT | Mod: 50

## 2021-03-15 PROCEDURE — 15877 SUCTION LIPECTOMY TRUNK: CPT | Mod: XS

## 2021-03-15 PROCEDURE — 15877 SUCTION LIPECTOMY TRUNK: CPT | Mod: 59

## 2021-03-15 PROCEDURE — C9399: CPT

## 2021-03-15 PROCEDURE — C1889: CPT

## 2021-03-15 PROCEDURE — 88305 TISSUE EXAM BY PATHOLOGIST: CPT | Mod: 26

## 2021-03-15 RX ORDER — HYDROMORPHONE HYDROCHLORIDE 2 MG/ML
0.25 INJECTION INTRAMUSCULAR; INTRAVENOUS; SUBCUTANEOUS
Refills: 0 | Status: DISCONTINUED | OUTPATIENT
Start: 2021-03-15 | End: 2021-03-15

## 2021-03-15 RX ORDER — SODIUM CHLORIDE 9 MG/ML
1000 INJECTION, SOLUTION INTRAVENOUS
Refills: 0 | Status: DISCONTINUED | OUTPATIENT
Start: 2021-03-15 | End: 2021-03-29

## 2021-03-15 RX ORDER — ONDANSETRON 8 MG/1
4 TABLET, FILM COATED ORAL ONCE
Refills: 0 | Status: DISCONTINUED | OUTPATIENT
Start: 2021-03-15 | End: 2021-03-29

## 2021-03-15 RX ORDER — OXYCODONE HYDROCHLORIDE 5 MG/1
5 TABLET ORAL ONCE
Refills: 0 | Status: DISCONTINUED | OUTPATIENT
Start: 2021-03-15 | End: 2021-03-15

## 2021-03-15 RX ORDER — CHLORHEXIDINE GLUCONATE 213 G/1000ML
1 SOLUTION TOPICAL ONCE
Refills: 0 | Status: COMPLETED | OUTPATIENT
Start: 2021-03-15 | End: 2021-03-15

## 2021-03-15 RX ORDER — APREPITANT 80 MG/1
40 CAPSULE ORAL ONCE
Refills: 0 | Status: COMPLETED | OUTPATIENT
Start: 2021-03-15 | End: 2021-03-15

## 2021-03-15 RX ADMIN — APREPITANT 40 MILLIGRAM(S): 80 CAPSULE ORAL at 06:45

## 2021-03-15 RX ADMIN — CHLORHEXIDINE GLUCONATE 1 APPLICATION(S): 213 SOLUTION TOPICAL at 06:45

## 2021-03-15 NOTE — PRE-ANESTHESIA EVALUATION ADULT - NSANTHPEFT_GEN_ALL_CORE
the patient has earrings in that she states she's had since she was 9 years old and they do not come out.  She is aware of the risks with electrocautery and had multiple prior surgeries without any complications .

## 2021-03-15 NOTE — PRE-ANESTHESIA EVALUATION ADULT - NSDENTALSD_ENT_ALL_CORE
appears normal and intact chip right, upper incisor, note prior to induction/appears normal and intact

## 2021-03-15 NOTE — ASU PATIENT PROFILE, ADULT - PSH
H/O abdominoplasty  2018  H/O arthroscopy of knee  bilateral  H/O  section    H/O LEEP  2020  H/O unilateral nephrectomy  left, elective for donation 2012  History of appendectomy  2017  History of ovarian cystectomy  2016  S/P LASIK surgery  2020

## 2021-03-15 NOTE — BRIEF OPERATIVE NOTE - OPERATION/FINDINGS
B/l breast reduction through inferior pedicles, Wise pattern skin excision. Liposuction of the trunk and abdominal wall.

## 2021-03-15 NOTE — PRE-ANESTHESIA EVALUATION ADULT - NSANTHOSAYNRD_GEN_A_CORE
No. AMPARO screening performed.  STOP BANG Legend: 0-2 = LOW Risk; 3-4 = INTERMEDIATE Risk; 5-8 = HIGH Risk

## 2021-03-15 NOTE — ASU DISCHARGE PLAN (ADULT/PEDIATRIC) - ASU DC SPECIAL INSTRUCTIONSFT
1. Please follow up with Dr. Gabriel's PA, Catalina, THIS WEEK FRIDAY for your first post operative visit.   Please call [phone] for an appointment time.    2. Please avoid any strenuous activities, AVOID bending, stretching, reaching overhead, or any heavy lifting.  Please do not remove the dressings. Keep the bra & binder intact until your first appointment.      3. Some OOZING and blood on the dressing is normal and to be expected. If it becomes saturated w/ BRIGHT red blood that does not stop, please call [phone] for email CATALINA: [email or web address]    4. Your medications were sent to your pharmacy.  Please take the prescribed medications as directed.   For MILD pain please take regular over the counter pain medications such as: Advil, Tylenol, Motrin.   Only take the narcotic prescribed pain medication for SEVERE PAIN.   If constipation occurs after taking narcotic pain medications, please take an over the counter stool softener.

## 2021-03-15 NOTE — BRIEF OPERATIVE NOTE - NSICDXBRIEFPROCEDURE_GEN_ALL_CORE_FT
PROCEDURES:  Liposuction, trunk 15-Mar-2021 11:08:29  Jn Sparks  Breast reduction 15-Mar-2021 11:08:19  Jn Sparks

## 2021-03-15 NOTE — PRE-ANESTHESIA EVALUATION ADULT - LAST STRESS TEST
2 yr ago as per patient normal  ( EKG 12/10/2020 @ [facility] pt was in ER due to bleeding after LEEP procedure, all resolved  )

## 2021-03-15 NOTE — ASU DISCHARGE PLAN (ADULT/PEDIATRIC) - CALL YOUR DOCTOR IF YOU HAVE ANY OF THE FOLLOWING:
[phone] or call 911./Bleeding that does not stop/Swelling that gets worse/Pain not relieved by Medications/Fever greater than (need to indicate Fahrenheit or Celsius)/Wound/Surgical Site with redness, or foul smelling discharge or pus

## 2021-03-15 NOTE — ASU PATIENT PROFILE, ADULT - PMH
Cervical cancer  abnormal pap , s/p LEEP  11/2020 , no chemo , no radiation , denies oncologist , pt  planning  hysteretomy in the future  Hypertrophy of breast    Kidney donor  2012 stable renal function  Mammogram abnormal  s/p breast bx of both breasts 12/2020, as per patient - path was benign  Ovarian cancer  x 10 years   Chemo x 6 month

## 2021-03-15 NOTE — ASU DISCHARGE PLAN (ADULT/PEDIATRIC) - PAIN MANAGEMENT
Prescriptions electronically submitted to pharmacy from [facility] Any Tylenol or products containing Tylenol may begin on or after 2:17 pm today/Prescriptions electronically submitted to pharmacy from [facility]

## 2021-03-15 NOTE — BRIEF OPERATIVE NOTE - DISPOSITION
Pt arrives via EMS from home. Complains of SOA since yesterday. Pt is on 2L O2 all the time. EMS reports initial sats 85%. Received a duo-neb in route which helped some.   
RR

## 2021-03-15 NOTE — PRE-ANESTHESIA EVALUATION ADULT - LAST ECHOCARDIOGRAM
2 years ago  [phone] @ [place] [facility] , as per patient  normal  cardiac evaluation  *** ECHO 10/11/2016 @ [facility] normal EF, grade II diastolic dysfunction ***

## 2021-03-18 ENCOUNTER — APPOINTMENT (OUTPATIENT)
Dept: PLASTIC SURGERY | Facility: CLINIC | Age: 36
End: 2021-03-18
Payer: COMMERCIAL

## 2021-03-18 VITALS
OXYGEN SATURATION: 99 % | WEIGHT: 162.63 LBS | DIASTOLIC BLOOD PRESSURE: 69 MMHG | HEIGHT: 67 IN | HEART RATE: 92 BPM | SYSTOLIC BLOOD PRESSURE: 101 MMHG | TEMPERATURE: 98.6 F | BODY MASS INDEX: 25.53 KG/M2

## 2021-03-18 LAB — SURGICAL PATHOLOGY STUDY: SIGNIFICANT CHANGE UP

## 2021-03-18 PROCEDURE — 99024 POSTOP FOLLOW-UP VISIT: CPT

## 2021-03-25 ENCOUNTER — APPOINTMENT (OUTPATIENT)
Dept: PLASTIC SURGERY | Facility: CLINIC | Age: 36
End: 2021-03-25
Payer: COMMERCIAL

## 2021-03-25 PROCEDURE — XXXXX: CPT

## 2021-04-08 ENCOUNTER — APPOINTMENT (OUTPATIENT)
Dept: PLASTIC SURGERY | Facility: CLINIC | Age: 36
End: 2021-04-08
Payer: COMMERCIAL

## 2021-04-08 VITALS
BODY MASS INDEX: 25.43 KG/M2 | SYSTOLIC BLOOD PRESSURE: 99 MMHG | DIASTOLIC BLOOD PRESSURE: 67 MMHG | TEMPERATURE: 97.9 F | WEIGHT: 162 LBS | OXYGEN SATURATION: 100 % | HEART RATE: 82 BPM | HEIGHT: 67 IN

## 2021-04-08 PROCEDURE — 99024 POSTOP FOLLOW-UP VISIT: CPT

## 2021-04-22 ENCOUNTER — APPOINTMENT (OUTPATIENT)
Dept: PLASTIC SURGERY | Facility: CLINIC | Age: 36
End: 2021-04-22
Payer: COMMERCIAL

## 2021-04-22 VITALS
TEMPERATURE: 97.9 F | SYSTOLIC BLOOD PRESSURE: 108 MMHG | DIASTOLIC BLOOD PRESSURE: 72 MMHG | WEIGHT: 162 LBS | OXYGEN SATURATION: 99 % | HEIGHT: 67 IN | BODY MASS INDEX: 25.43 KG/M2 | HEART RATE: 80 BPM

## 2021-04-22 DIAGNOSIS — M25.511 PAIN IN RIGHT SHOULDER: ICD-10-CM

## 2021-04-22 DIAGNOSIS — M25.512 PAIN IN RIGHT SHOULDER: ICD-10-CM

## 2021-04-22 DIAGNOSIS — Z87.39 PERSONAL HISTORY OF OTHER DISEASES OF THE MUSCULOSKELETAL SYSTEM AND CONNECTIVE TISSUE: ICD-10-CM

## 2021-04-22 DIAGNOSIS — Z87.898 PERSONAL HISTORY OF OTHER SPECIFIED CONDITIONS: ICD-10-CM

## 2021-04-22 PROCEDURE — 99024 POSTOP FOLLOW-UP VISIT: CPT

## 2021-05-20 ENCOUNTER — APPOINTMENT (OUTPATIENT)
Dept: PLASTIC SURGERY | Facility: CLINIC | Age: 36
End: 2021-05-20
Payer: COMMERCIAL

## 2021-05-20 PROCEDURE — 99024 POSTOP FOLLOW-UP VISIT: CPT

## 2021-07-14 ENCOUNTER — EMERGENCY (EMERGENCY)
Facility: HOSPITAL | Age: 36
LOS: 0 days | Discharge: LEFT BEFORE DISCHARGE | End: 2021-07-14
Payer: COMMERCIAL

## 2021-07-14 VITALS
HEART RATE: 79 BPM | HEIGHT: 67 IN | RESPIRATION RATE: 16 BRPM | TEMPERATURE: 98 F | SYSTOLIC BLOOD PRESSURE: 105 MMHG | DIASTOLIC BLOOD PRESSURE: 69 MMHG | OXYGEN SATURATION: 100 % | WEIGHT: 160.06 LBS

## 2021-07-14 DIAGNOSIS — Z98.890 OTHER SPECIFIED POSTPROCEDURAL STATES: Chronic | ICD-10-CM

## 2021-07-14 DIAGNOSIS — Z98.89 OTHER SPECIFIED POSTPROCEDURAL STATES: Chronic | ICD-10-CM

## 2021-07-14 DIAGNOSIS — R42 DIZZINESS AND GIDDINESS: ICD-10-CM

## 2021-07-14 DIAGNOSIS — Z88.8 ALLERGY STATUS TO OTHER DRUGS, MEDICAMENTS AND BIOLOGICAL SUBSTANCES STATUS: ICD-10-CM

## 2021-07-14 DIAGNOSIS — R53.1 WEAKNESS: ICD-10-CM

## 2021-07-14 DIAGNOSIS — Z88.0 ALLERGY STATUS TO PENICILLIN: ICD-10-CM

## 2021-07-14 DIAGNOSIS — Z90.5 ACQUIRED ABSENCE OF KIDNEY: Chronic | ICD-10-CM

## 2021-07-14 DIAGNOSIS — R51.0 HEADACHE WITH ORTHOSTATIC COMPONENT, NOT ELSEWHERE CLASSIFIED: ICD-10-CM

## 2021-07-14 DIAGNOSIS — Z90.49 ACQUIRED ABSENCE OF OTHER SPECIFIED PARTS OF DIGESTIVE TRACT: Chronic | ICD-10-CM

## 2021-07-14 PROCEDURE — L9993: CPT

## 2021-07-14 PROCEDURE — 93010 ELECTROCARDIOGRAM REPORT: CPT

## 2021-07-14 NOTE — ED ADULT TRIAGE NOTE - CHIEF COMPLAINT QUOTE
Patient sent from DR. Luisito Bethea's office with c/o dizziness, headaches, weakness x 3 weeks.  Denies chest pains or sob ,Admits she had a miscarriage around fathers day.

## 2021-07-16 ENCOUNTER — APPOINTMENT (OUTPATIENT)
Dept: PLASTIC SURGERY | Facility: CLINIC | Age: 36
End: 2021-07-16
Payer: COMMERCIAL

## 2021-07-16 PROCEDURE — XXXXX: CPT

## 2021-07-16 NOTE — HISTORY OF PRESENT ILLNESS
[FreeTextEntry1] : 35 year old female who presents for a post op visit s/p bilateral breast reduction and liposuction DOS: 03/15/21.\par

## 2021-08-04 ENCOUNTER — EMERGENCY (EMERGENCY)
Facility: HOSPITAL | Age: 36
LOS: 1 days | Discharge: HOME | End: 2021-08-04
Attending: EMERGENCY MEDICINE | Admitting: EMERGENCY MEDICINE
Payer: COMMERCIAL

## 2021-08-04 VITALS
SYSTOLIC BLOOD PRESSURE: 106 MMHG | HEIGHT: 67 IN | TEMPERATURE: 98 F | DIASTOLIC BLOOD PRESSURE: 76 MMHG | RESPIRATION RATE: 16 BRPM | HEART RATE: 78 BPM | OXYGEN SATURATION: 100 %

## 2021-08-04 DIAGNOSIS — Z90.5 ACQUIRED ABSENCE OF KIDNEY: Chronic | ICD-10-CM

## 2021-08-04 DIAGNOSIS — Z98.890 OTHER SPECIFIED POSTPROCEDURAL STATES: Chronic | ICD-10-CM

## 2021-08-04 DIAGNOSIS — Z98.89 OTHER SPECIFIED POSTPROCEDURAL STATES: Chronic | ICD-10-CM

## 2021-08-04 DIAGNOSIS — Z90.49 ACQUIRED ABSENCE OF OTHER SPECIFIED PARTS OF DIGESTIVE TRACT: Chronic | ICD-10-CM

## 2021-08-04 LAB
ALBUMIN SERPL ELPH-MCNC: 4 G/DL — SIGNIFICANT CHANGE UP (ref 3.3–5)
ALP SERPL-CCNC: 62 U/L — SIGNIFICANT CHANGE UP (ref 40–120)
ALT FLD-CCNC: 7 U/L — SIGNIFICANT CHANGE UP (ref 4–33)
ANION GAP SERPL CALC-SCNC: 13 MMOL/L — SIGNIFICANT CHANGE UP (ref 7–14)
APPEARANCE UR: ABNORMAL
AST SERPL-CCNC: 10 U/L — SIGNIFICANT CHANGE UP (ref 4–32)
BACTERIA # UR AUTO: ABNORMAL
BASOPHILS # BLD AUTO: 0.03 K/UL — SIGNIFICANT CHANGE UP (ref 0–0.2)
BASOPHILS NFR BLD AUTO: 0.4 % — SIGNIFICANT CHANGE UP (ref 0–2)
BILIRUB SERPL-MCNC: 0.2 MG/DL — SIGNIFICANT CHANGE UP (ref 0.2–1.2)
BILIRUB UR-MCNC: NEGATIVE — SIGNIFICANT CHANGE UP
BLD GP AB SCN SERPL QL: NEGATIVE — SIGNIFICANT CHANGE UP
BUN SERPL-MCNC: 11 MG/DL — SIGNIFICANT CHANGE UP (ref 7–23)
CALCIUM SERPL-MCNC: 9.3 MG/DL — SIGNIFICANT CHANGE UP (ref 8.4–10.5)
CHLORIDE SERPL-SCNC: 105 MMOL/L — SIGNIFICANT CHANGE UP (ref 98–107)
CO2 SERPL-SCNC: 20 MMOL/L — LOW (ref 22–31)
COLOR SPEC: SIGNIFICANT CHANGE UP
CREAT SERPL-MCNC: 0.94 MG/DL — SIGNIFICANT CHANGE UP (ref 0.5–1.3)
DIFF PNL FLD: NEGATIVE — SIGNIFICANT CHANGE UP
EOSINOPHIL # BLD AUTO: 0.01 K/UL — SIGNIFICANT CHANGE UP (ref 0–0.5)
EOSINOPHIL NFR BLD AUTO: 0.1 % — SIGNIFICANT CHANGE UP (ref 0–6)
EPI CELLS # UR: 9 /HPF — HIGH (ref 0–5)
GLUCOSE SERPL-MCNC: 88 MG/DL — SIGNIFICANT CHANGE UP (ref 70–99)
GLUCOSE UR QL: NEGATIVE — SIGNIFICANT CHANGE UP
HCG SERPL-ACNC: SIGNIFICANT CHANGE UP MIU/ML
HCT VFR BLD CALC: 36.1 % — SIGNIFICANT CHANGE UP (ref 34.5–45)
HGB BLD-MCNC: 11.7 G/DL — SIGNIFICANT CHANGE UP (ref 11.5–15.5)
HYALINE CASTS # UR AUTO: 5 /LPF — SIGNIFICANT CHANGE UP (ref 0–7)
IANC: 5.81 K/UL — SIGNIFICANT CHANGE UP (ref 1.5–8.5)
IMM GRANULOCYTES NFR BLD AUTO: 0.3 % — SIGNIFICANT CHANGE UP (ref 0–1.5)
KETONES UR-MCNC: ABNORMAL
LEUKOCYTE ESTERASE UR-ACNC: NEGATIVE — SIGNIFICANT CHANGE UP
LIDOCAIN IGE QN: 19 U/L — SIGNIFICANT CHANGE UP (ref 7–60)
LYMPHOCYTES # BLD AUTO: 1.52 K/UL — SIGNIFICANT CHANGE UP (ref 1–3.3)
LYMPHOCYTES # BLD AUTO: 19.6 % — SIGNIFICANT CHANGE UP (ref 13–44)
MCHC RBC-ENTMCNC: 25.9 PG — LOW (ref 27–34)
MCHC RBC-ENTMCNC: 32.4 GM/DL — SIGNIFICANT CHANGE UP (ref 32–36)
MCV RBC AUTO: 79.9 FL — LOW (ref 80–100)
MONOCYTES # BLD AUTO: 0.35 K/UL — SIGNIFICANT CHANGE UP (ref 0–0.9)
MONOCYTES NFR BLD AUTO: 4.5 % — SIGNIFICANT CHANGE UP (ref 2–14)
NEUTROPHILS # BLD AUTO: 5.81 K/UL — SIGNIFICANT CHANGE UP (ref 1.8–7.4)
NEUTROPHILS NFR BLD AUTO: 75.1 % — SIGNIFICANT CHANGE UP (ref 43–77)
NITRITE UR-MCNC: NEGATIVE — SIGNIFICANT CHANGE UP
NRBC # BLD: 0 /100 WBCS — SIGNIFICANT CHANGE UP
NRBC # FLD: 0 K/UL — SIGNIFICANT CHANGE UP
PH UR: 6.5 — SIGNIFICANT CHANGE UP (ref 5–8)
PLATELET # BLD AUTO: 225 K/UL — SIGNIFICANT CHANGE UP (ref 150–400)
POTASSIUM SERPL-MCNC: 4 MMOL/L — SIGNIFICANT CHANGE UP (ref 3.5–5.3)
POTASSIUM SERPL-SCNC: 4 MMOL/L — SIGNIFICANT CHANGE UP (ref 3.5–5.3)
PROT SERPL-MCNC: 7 G/DL — SIGNIFICANT CHANGE UP (ref 6–8.3)
PROT UR-MCNC: ABNORMAL
RBC # BLD: 4.52 M/UL — SIGNIFICANT CHANGE UP (ref 3.8–5.2)
RBC # FLD: 15.7 % — HIGH (ref 10.3–14.5)
RBC CASTS # UR COMP ASSIST: 2 /HPF — SIGNIFICANT CHANGE UP (ref 0–4)
RH IG SCN BLD-IMP: POSITIVE — SIGNIFICANT CHANGE UP
SODIUM SERPL-SCNC: 138 MMOL/L — SIGNIFICANT CHANGE UP (ref 135–145)
SP GR SPEC: 1.01 — SIGNIFICANT CHANGE UP (ref 1.01–1.02)
UROBILINOGEN FLD QL: SIGNIFICANT CHANGE UP
WBC # BLD: 7.74 K/UL — SIGNIFICANT CHANGE UP (ref 3.8–10.5)
WBC # FLD AUTO: 7.74 K/UL — SIGNIFICANT CHANGE UP (ref 3.8–10.5)
WBC UR QL: 5 /HPF — SIGNIFICANT CHANGE UP (ref 0–5)

## 2021-08-04 PROCEDURE — 99285 EMERGENCY DEPT VISIT HI MDM: CPT

## 2021-08-04 PROCEDURE — 76830 TRANSVAGINAL US NON-OB: CPT | Mod: 26

## 2021-08-04 RX ORDER — NITROFURANTOIN MACROCRYSTAL 50 MG
1 CAPSULE ORAL
Qty: 14 | Refills: 0
Start: 2021-08-04 | End: 2021-08-10

## 2021-08-04 RX ORDER — SODIUM CHLORIDE 9 MG/ML
1000 INJECTION INTRAMUSCULAR; INTRAVENOUS; SUBCUTANEOUS ONCE
Refills: 0 | Status: COMPLETED | OUTPATIENT
Start: 2021-08-04 | End: 2021-08-04

## 2021-08-04 RX ORDER — NITROFURANTOIN MACROCRYSTAL 50 MG
100 CAPSULE ORAL ONCE
Refills: 0 | Status: DISCONTINUED | OUTPATIENT
Start: 2021-08-04 | End: 2021-08-08

## 2021-08-04 RX ADMIN — SODIUM CHLORIDE 1000 MILLILITER(S): 9 INJECTION INTRAMUSCULAR; INTRAVENOUS; SUBCUTANEOUS at 15:45

## 2021-08-04 NOTE — ED PROVIDER NOTE - CARE PLAN
Principal Discharge DX:	Intrauterine pregnancy  Secondary Diagnosis:	Subchorionic hematoma in first trimester

## 2021-08-04 NOTE — ED PROVIDER NOTE - CLINICAL SUMMARY MEDICAL DECISION MAKING FREE TEXT BOX
36 y/o  hx of cervical cancer presents w/ + urine preg, n/v, and 1 ep of spotting. Patient also c/o rlq pain non radiating, 3-4/10. Vitals stable, no bleeding or discharge on pelvic, no adnexal or cervical tenderness. Unconfirmed IUP. Low suspicion for ectopic. Unlikely ovarian torsion due to presentation. Hx of appendix removal.  Will get transvaginal U/S, cbc, cmp, type screen and bhcg. IVF for lighheadedness. 38 y/o  hx of cervical cancer presents w/ + urine preg, n/v, and 1 ep of spotting. Patient also c/o rlq pain non radiating, 3-4/10. Vitals stable, no bleeding or discharge on pelvic, no adnexal or cervical tenderness. Unconfirmed IUP. Low suspicion for ectopic. Unlikely ovarian torsion due to presentation. Hx of appendix removal. No hx of STDs and no f/c, PID unlikely.  Will get transvaginal U/S, cbc, cmp, type screen and bhcg. IVF for lighheadedness.

## 2021-08-04 NOTE — ED ADULT TRIAGE NOTE - CHIEF COMPLAINT QUOTE
pt states she took a positive pregnancy test today, requesting further workup for confirmation of pregnancy. Has been endorsing N/V. Denies any other complaints. No PMH

## 2021-08-04 NOTE — ED PROVIDER NOTE - PHYSICAL EXAMINATION
General: Alert and Orientated x 3. No apparent distress.  Head: Normocephalic and atraumatic.  Eyes: PERRLA with EOMI.  Neck: Supple. Trachea midline.   Cardiac: Normal S1 and S2 w/ RRR. No murmurs appreciated. No JVD appreciated.  Pulmonary: Vesicular breath sounds bilaterally. No increased WOB. No wheezes or crackles.  Abdominal: Mild TTP on RLQ. (+) bowel sounds appreciated in all 4 quadrants. No hepatosplenomegaly.   Neurologic: No focal sensory or motor deficits. CN 2-12 grossly intact.   Musculoskeletal: Strength appropriate in all 4 extremities for age with no limited ROM.  Skin: Color appropriate for race. Intact, warm, and well-perfused.  Psychiatric: Appropriate mood and affect. No apparent risk to self or others.  Pelvic: Chaperone: Dr. Rushing, no cervical motion or adnexal tenderness. On speculum exam, no bleeding. cervical os. physiological discharge.

## 2021-08-04 NOTE — ED PROVIDER NOTE - PATIENT PORTAL LINK FT
You can access the FollowMyHealth Patient Portal offered by [facility] by registering at the following website: [email or web address]. By joining [employer]’s FollowMyHealth portal, you will also be able to view your health information using other applications (apps) compatible with our system.

## 2021-08-04 NOTE — ED PROVIDER NOTE - NS ED ROS FT
CONSTITUTIONAL: No fevers, no chills, + lightheadedness, no dizziness  EYES: no visual changes, no eye pain  EARS: no ear drainage, no ear pain, no change in hearing  NOSE: no nasal congestion  MOUTH/THROAT: no sore throat  CV: No chest pain, no palpitations  RESP: No SOB, no cough  GI: see hpi   : no dysuria, no hematuria, no flank pain  MSK: no back pain, no extremity pain  SKIN: no rashes  NEURO: no headache, no focal weakness, no decreased sensation/parasthesias   PSYCHIATRIC: no known mental health issues

## 2021-08-04 NOTE — ED PROVIDER NOTE - NSFOLLOWUPINSTRUCTIONS_ED_ALL_ED_FT
You were found to be pregnant with subchorionic hematoma on your US.  On you urine test you were found to have bacteria.     Please take macrobid 100mg every 12 hours for 7 days.      -- Please follow-up with your ob.  Please call tomorrow for an appointment.  If you cannot follow-up with your primary doctor please return to the ED for any urgent issues.  -- You were given a copy of your labs and imaging.  Please go-over these with your doctor(s).   -- If you have any worsening of symptoms or any other concerns please see your doctor or return to the ED immediately.  -- Please continue taking your home medications as directed.  Do not use alcohol when taking any medication (especially antibiotics, tylenol or other pain medication) unless you check with the doctor or pharmacist.     Urinary Tract Infection    A urinary tract infection (UTI) is an infection of any part of the urinary tract, which includes the kidneys, ureters, bladder, and urethra. Risk factors include ignoring your need to urinate, wiping back to front if female, being an uncircumcised male, and having diabetes or a weak immune system. Symptoms include frequent urination, pain or burning with urination, foul smelling urine, cloudy urine, pain in the lower abdomen, blood in the urine, and fever. If you were prescribed an antibiotic medicine, take it as told by your health care provider. Do not stop taking the antibiotic even if you start to feel better.    SEEK IMMEDIATE MEDICAL CARE IF YOU HAVE ANY OF THE FOLLOWING SYMPTOMS: severe back or abdominal pain, fever, inability to keep fluids or medicine down, dizziness/lightheadedness, or a change in mental status.

## 2021-08-04 NOTE — ED PROVIDER NOTE - OBJECTIVE STATEMENT
Patient is 34 y/o  with PMH significant for cervical cancer , Patient is 36 y/o  with PMH significant for cervical cancer , who presents with nausea and vomiting w/ a positive pregnancy test. Patient states that she was experiencing nausea vomiting on on Saturday and one episode of vaginal spotting. No spotting since then. Today at work multiple episodes of vomiting. Took drugstore preg test as positive and came today. Today RLQ pain 3-4/10 and non-radiating. No bleeding or discharge. No fever or chills.  LMP unclear as patient may have had a miscarriage in . 2nd pregnancy with placenta previa. Patient is 36 y/o  with PMH significant for cervical cancer , who presents with nausea and vomiting w/ a positive pregnancy test. Patient states that she was experiencing nausea vomiting on on Saturday and one episode of vaginal spotting. No spotting since then. Today at work multiple episodes of vomiting. Took drugstore preg test as positive and came today. Today RLQ pain 3-4/10 and non-radiating. No bleeding or discharge. No fever or chills.  LMP unclear as patient may have had a miscarriage in . 2nd pregnancy with placenta previa. No hx of STDs, has been tested in past.

## 2021-08-04 NOTE — ED ADULT NURSE NOTE - OBJECTIVE STATEMENT
Received pt into spot #9 in intake area. Pt states has been very nauseous and vomiting today while working. Pt took pregnancy test while at work indicating that she is pregnant. 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 Pt c/o mild 3/10 pelvic pain. Denies any vaginal bleeding or spotting today but states this past Saturday pt did have 1 episode where she had vaginal spotting. Pt awaiting eval by MD.

## 2021-08-04 NOTE — ED PROVIDER NOTE - ATTENDING CONTRIBUTION TO CARE
Gong: I have seen and examined the patient face to face, have reviewed and addended the HPI, PE and a/p as necessary.     38 yo  with cervical cancer presents a/w nausea and vomiting and found to be UCG positive.  Reporting RLQ pain non radiating, rated 3-4/10.  Noted to have multiple episodes of vomiting. Took drugstore preg test as positive and came today. No bleeding or discharge. No fever or chills.  LMP unclear as patient may have had a miscarriage in . No hx of STDs, has been tested in past.  No fever/chills, No photophobia/eye pain/changes in vision, No ear pain/sore throat/dysphagia, No chest pain/palpitations, no SOB/cough/wheeze/stridor, no dysuria/frequency/discharge, No neck/back pain, no rash, no changes in neurological status/function.     GEN - NAD; well appearing; A+O x3; non-toxic appearing  CARD -s1s2, RRR, no M,G,R;   PULM - CTA b/l, symmetric breath sounds;   ABD -  +BS, TTP in RLQ/R pelvic region, ND, soft, no guarding, no rebound, no masses;   BACK - no CVA tenderness, Normal  spine;   EXT - symmetric pulses, 2+ dp, capillary refill < 2 seconds, no cyanosis, no edema;   NEURO - no focal neuro deficits, no slurred speech    38 yo  with cervical cancer presents a/w nausea and vomiting and found to be UCG positive.  No adnexal tenderness on pelvic, no confirmed IUP, will check ua, cbc, cmp, tvus, type and screen HCG, and re-eval.

## 2021-08-06 ENCOUNTER — NON-APPOINTMENT (OUTPATIENT)
Age: 36
End: 2021-08-06

## 2021-08-14 ENCOUNTER — APPOINTMENT (OUTPATIENT)
Dept: OBGYN | Facility: CLINIC | Age: 36
End: 2021-08-14
Payer: COMMERCIAL

## 2021-08-14 PROCEDURE — 99385 PREV VISIT NEW AGE 18-39: CPT

## 2021-08-14 PROCEDURE — 36415 COLL VENOUS BLD VENIPUNCTURE: CPT

## 2021-08-17 VITALS
SYSTOLIC BLOOD PRESSURE: 113 MMHG | BODY MASS INDEX: 26.21 KG/M2 | DIASTOLIC BLOOD PRESSURE: 67 MMHG | WEIGHT: 167 LBS | HEIGHT: 67 IN

## 2021-08-23 ENCOUNTER — NON-APPOINTMENT (OUTPATIENT)
Age: 36
End: 2021-08-23

## 2021-08-24 DIAGNOSIS — A74.9 CHLAMYDIAL INFECTION, UNSPECIFIED: ICD-10-CM

## 2021-08-25 ENCOUNTER — NON-APPOINTMENT (OUTPATIENT)
Age: 36
End: 2021-08-25

## 2021-08-25 DIAGNOSIS — Z14.1 CYSTIC FIBROSIS CARRIER: ICD-10-CM

## 2021-08-27 ENCOUNTER — NON-APPOINTMENT (OUTPATIENT)
Age: 36
End: 2021-08-27

## 2021-08-27 DIAGNOSIS — Z98.891 HISTORY OF UTERINE SCAR FROM PREVIOUS SURGERY: ICD-10-CM

## 2021-08-27 DIAGNOSIS — Z92.89 PERSONAL HISTORY OF OTHER MEDICAL TREATMENT: ICD-10-CM

## 2021-08-27 DIAGNOSIS — Z78.9 OTHER SPECIFIED HEALTH STATUS: ICD-10-CM

## 2021-08-27 DIAGNOSIS — N91.1 SECONDARY AMENORRHEA: ICD-10-CM

## 2021-08-27 DIAGNOSIS — Z12.72 ENCOUNTER FOR SCREENING FOR MALIGNANT NEOPLASM OF VAGINA: ICD-10-CM

## 2021-08-27 DIAGNOSIS — R87.613 HIGH GRADE SQUAMOUS INTRAEPITHELIAL LESION ON CYTOLOGIC SMEAR OF CERVIX (HGSIL): ICD-10-CM

## 2021-08-27 DIAGNOSIS — Z34.90 ENCOUNTER FOR SUPERVISION OF NORMAL PREGNANCY, UNSPECIFIED, UNSPECIFIED TRIMESTER: ICD-10-CM

## 2021-08-28 ENCOUNTER — APPOINTMENT (OUTPATIENT)
Dept: ANTEPARTUM | Facility: CLINIC | Age: 36
End: 2021-08-28
Payer: COMMERCIAL

## 2021-08-28 PROCEDURE — 76801 OB US < 14 WKS SINGLE FETUS: CPT

## 2021-09-03 ENCOUNTER — EMERGENCY (EMERGENCY)
Facility: HOSPITAL | Age: 36
LOS: 1 days | Discharge: HOME | End: 2021-09-03
Attending: STUDENT IN AN ORGANIZED HEALTH CARE EDUCATION/TRAINING PROGRAM | Admitting: STUDENT IN AN ORGANIZED HEALTH CARE EDUCATION/TRAINING PROGRAM
Payer: COMMERCIAL

## 2021-09-03 VITALS
DIASTOLIC BLOOD PRESSURE: 55 MMHG | TEMPERATURE: 99 F | RESPIRATION RATE: 20 BRPM | HEART RATE: 75 BPM | HEIGHT: 67 IN | OXYGEN SATURATION: 99 % | SYSTOLIC BLOOD PRESSURE: 99 MMHG

## 2021-09-03 DIAGNOSIS — Z98.890 OTHER SPECIFIED POSTPROCEDURAL STATES: Chronic | ICD-10-CM

## 2021-09-03 DIAGNOSIS — Z98.89 OTHER SPECIFIED POSTPROCEDURAL STATES: Chronic | ICD-10-CM

## 2021-09-03 DIAGNOSIS — Z90.49 ACQUIRED ABSENCE OF OTHER SPECIFIED PARTS OF DIGESTIVE TRACT: Chronic | ICD-10-CM

## 2021-09-03 DIAGNOSIS — Z90.5 ACQUIRED ABSENCE OF KIDNEY: Chronic | ICD-10-CM

## 2021-09-03 LAB
ALBUMIN SERPL ELPH-MCNC: 3.6 G/DL — SIGNIFICANT CHANGE UP (ref 3.3–5)
ALP SERPL-CCNC: 61 U/L — SIGNIFICANT CHANGE UP (ref 40–120)
ALT FLD-CCNC: 15 U/L — SIGNIFICANT CHANGE UP (ref 4–33)
ANION GAP SERPL CALC-SCNC: 10 MMOL/L — SIGNIFICANT CHANGE UP (ref 7–14)
APPEARANCE UR: CLEAR — SIGNIFICANT CHANGE UP
AST SERPL-CCNC: 10 U/L — SIGNIFICANT CHANGE UP (ref 4–32)
BACTERIA # UR AUTO: ABNORMAL
BASOPHILS # BLD AUTO: 0.04 K/UL — SIGNIFICANT CHANGE UP (ref 0–0.2)
BASOPHILS NFR BLD AUTO: 0.4 % — SIGNIFICANT CHANGE UP (ref 0–2)
BILIRUB SERPL-MCNC: <0.2 MG/DL — SIGNIFICANT CHANGE UP (ref 0.2–1.2)
BILIRUB UR-MCNC: NEGATIVE — SIGNIFICANT CHANGE UP
BUN SERPL-MCNC: 14 MG/DL — SIGNIFICANT CHANGE UP (ref 7–23)
CALCIUM SERPL-MCNC: 9.4 MG/DL — SIGNIFICANT CHANGE UP (ref 8.4–10.5)
CHLORIDE SERPL-SCNC: 103 MMOL/L — SIGNIFICANT CHANGE UP (ref 98–107)
CO2 SERPL-SCNC: 23 MMOL/L — SIGNIFICANT CHANGE UP (ref 22–31)
COLOR SPEC: YELLOW — SIGNIFICANT CHANGE UP
CREAT SERPL-MCNC: 0.86 MG/DL — SIGNIFICANT CHANGE UP (ref 0.5–1.3)
DIFF PNL FLD: NEGATIVE — SIGNIFICANT CHANGE UP
EOSINOPHIL # BLD AUTO: 0.04 K/UL — SIGNIFICANT CHANGE UP (ref 0–0.5)
EOSINOPHIL NFR BLD AUTO: 0.4 % — SIGNIFICANT CHANGE UP (ref 0–6)
EPI CELLS # UR: 7 /HPF — HIGH (ref 0–5)
GLUCOSE SERPL-MCNC: 81 MG/DL — SIGNIFICANT CHANGE UP (ref 70–99)
GLUCOSE UR QL: NEGATIVE — SIGNIFICANT CHANGE UP
HCT VFR BLD CALC: 33.1 % — LOW (ref 34.5–45)
HGB BLD-MCNC: 11.2 G/DL — LOW (ref 11.5–15.5)
HYALINE CASTS # UR AUTO: 1 /LPF — SIGNIFICANT CHANGE UP (ref 0–7)
IANC: 6.83 K/UL — SIGNIFICANT CHANGE UP (ref 1.5–8.5)
IMM GRANULOCYTES NFR BLD AUTO: 0.4 % — SIGNIFICANT CHANGE UP (ref 0–1.5)
KETONES UR-MCNC: NEGATIVE — SIGNIFICANT CHANGE UP
LEUKOCYTE ESTERASE UR-ACNC: NEGATIVE — SIGNIFICANT CHANGE UP
LYMPHOCYTES # BLD AUTO: 2.42 K/UL — SIGNIFICANT CHANGE UP (ref 1–3.3)
LYMPHOCYTES # BLD AUTO: 24.3 % — SIGNIFICANT CHANGE UP (ref 13–44)
MAGNESIUM SERPL-MCNC: 1.9 MG/DL — SIGNIFICANT CHANGE UP (ref 1.6–2.6)
MCHC RBC-ENTMCNC: 27.9 PG — SIGNIFICANT CHANGE UP (ref 27–34)
MCHC RBC-ENTMCNC: 33.8 GM/DL — SIGNIFICANT CHANGE UP (ref 32–36)
MCV RBC AUTO: 82.3 FL — SIGNIFICANT CHANGE UP (ref 80–100)
MONOCYTES # BLD AUTO: 0.59 K/UL — SIGNIFICANT CHANGE UP (ref 0–0.9)
MONOCYTES NFR BLD AUTO: 5.9 % — SIGNIFICANT CHANGE UP (ref 2–14)
NEUTROPHILS # BLD AUTO: 6.83 K/UL — SIGNIFICANT CHANGE UP (ref 1.8–7.4)
NEUTROPHILS NFR BLD AUTO: 68.6 % — SIGNIFICANT CHANGE UP (ref 43–77)
NITRITE UR-MCNC: NEGATIVE — SIGNIFICANT CHANGE UP
NRBC # BLD: 0 /100 WBCS — SIGNIFICANT CHANGE UP
NRBC # FLD: 0 K/UL — SIGNIFICANT CHANGE UP
PH UR: 6 — SIGNIFICANT CHANGE UP (ref 5–8)
PHOSPHATE SERPL-MCNC: 4.7 MG/DL — HIGH (ref 2.5–4.5)
PLATELET # BLD AUTO: 227 K/UL — SIGNIFICANT CHANGE UP (ref 150–400)
POTASSIUM SERPL-MCNC: 4.1 MMOL/L — SIGNIFICANT CHANGE UP (ref 3.5–5.3)
POTASSIUM SERPL-SCNC: 4.1 MMOL/L — SIGNIFICANT CHANGE UP (ref 3.5–5.3)
PROT SERPL-MCNC: 6.5 G/DL — SIGNIFICANT CHANGE UP (ref 6–8.3)
PROT UR-MCNC: ABNORMAL
RBC # BLD: 4.02 M/UL — SIGNIFICANT CHANGE UP (ref 3.8–5.2)
RBC # FLD: 16.5 % — HIGH (ref 10.3–14.5)
RBC CASTS # UR COMP ASSIST: 1 /HPF — SIGNIFICANT CHANGE UP (ref 0–4)
SODIUM SERPL-SCNC: 136 MMOL/L — SIGNIFICANT CHANGE UP (ref 135–145)
SP GR SPEC: 1.03 — SIGNIFICANT CHANGE UP (ref 1–1.05)
UROBILINOGEN FLD QL: SIGNIFICANT CHANGE UP
WBC # BLD: 9.96 K/UL — SIGNIFICANT CHANGE UP (ref 3.8–10.5)
WBC # FLD AUTO: 9.96 K/UL — SIGNIFICANT CHANGE UP (ref 3.8–10.5)
WBC UR QL: 2 /HPF — SIGNIFICANT CHANGE UP (ref 0–5)

## 2021-09-03 PROCEDURE — 99284 EMERGENCY DEPT VISIT MOD MDM: CPT

## 2021-09-03 RX ORDER — SODIUM CHLORIDE 9 MG/ML
1000 INJECTION, SOLUTION INTRAVENOUS ONCE
Refills: 0 | Status: COMPLETED | OUTPATIENT
Start: 2021-09-03 | End: 2021-09-03

## 2021-09-03 RX ORDER — ONDANSETRON 8 MG/1
4 TABLET, FILM COATED ORAL ONCE
Refills: 0 | Status: COMPLETED | OUTPATIENT
Start: 2021-09-03 | End: 2021-09-03

## 2021-09-03 RX ADMIN — SODIUM CHLORIDE 1000 MILLILITER(S): 9 INJECTION, SOLUTION INTRAVENOUS at 22:02

## 2021-09-03 RX ADMIN — ONDANSETRON 4 MILLIGRAM(S): 8 TABLET, FILM COATED ORAL at 22:03

## 2021-09-03 NOTE — ED ADULT NURSE NOTE - OBJECTIVE STATEMENT
A&Ox4. ambulatory. c/o HA, n/v and Is four months pregnant.  NAD. pt denies  any abortions or miscarriages, CP, SOB, dizziness, syncope. respirations are even and un labored. akin intact. pt states BMs and urination are normal in frequency and color. 20g placed in left ac. labs drawn and sent. medications given as per md orders. safety precautions maintained. will continue to monitor.

## 2021-09-03 NOTE — ED PROVIDER NOTE - PROGRESS NOTE DETAILS
Blinder, DO: Patient complaining of feeling of globus, in her throat, unable to toelrate PO. feels that she cannot eat or drink due to it. Will get CT neck/upper chest. patient agrees with CT even given risk to fetus. Patient absconded. IV fluids found dripping onto floor in room. Patient was aware of plan to obtain imaging, and likely need for hospital admission. Patients personal phone called multiple times with no response. Name overheaded in the department with no response. OBGYN consult called and was unable to see patient prior to absconding.

## 2021-09-03 NOTE — ED ADULT TRIAGE NOTE - CHIEF COMPLAINT QUOTE
Pt is 3 months pregnant and is having headache, nausea, vomiting, unable to keep anything down, abdominal pain and vaginal leaking. L3. Denies any PMH.

## 2021-09-03 NOTE — ED PROVIDER NOTE - ATTENDING CONTRIBUTION TO CARE
36F no pmhx  presenting with 2 days of N/V/ unable to tolerate PO and mild Left/LLQ abd pain. 3 months pregnant, prenatal care. Did not take any meds. No vaginal bleeding or dysuria. No fever, chills, CP, SOB.  S/p appendectomy.      Awake, alert  RRR  CTA  tender LLQ, soft, non distended      Plan:  - UA  - CMP, CBC  - IVF, 1 L LR  - FHr  - Antiemetics  - PO challenge, dc home if passes  - Will CDU if unable to tolerate 36F no pmhx  presenting with 2 days of N/V/ unable to tolerate PO and mild Left/LLQ abd pain. 3 months pregnant, prenatal care. Did not take any meds. No vaginal bleeding or dysuria. No fever, chills, CP, SOB. Patient also admits to feeling food stuck in her throat.   S/p appendectomy.      Awake, alert  RRR  CTA  tender LLQ, soft, non distended      Plan:  - UA  - CMP, CBC  - IVF, 1 L LR  - FHr  - Antiemetics  - CT neck to eval for globus, possible GI consult  - FHR visualized on transabdominal US, unable to calculate rate  - PO challenge, dc home if passes  - Will CDU if unable to tolerate

## 2021-09-03 NOTE — ED PROVIDER NOTE - CLINICAL SUMMARY MEDICAL DECISION MAKING FREE TEXT BOX
36F  with a history of multiple previous surgeries, who presents 3 months pregnant with 2 days of N/V, inability to tolerate PO, fatigue. Denies fever/chills, dysuria, vaginal bleeding. AVSS. Mild LLQ abd pain. Likely normal pregnancy symptoms, will get labs, urine, give zofran, IV fluids, check FHr, and reassess.

## 2021-09-03 NOTE — ED PROVIDER NOTE - OBJECTIVE STATEMENT
36F with a h/o s/p lap appy, s/p multiple , s/p abdominoplasty,  , 3 months pregnant who presents with 2 days of weakness, N/V, inability to tolerate PO, and LLQ abd pain.  Her pregnancy was normal up until 2 days ago when she became nauseated and vomited multiple times, since then she has been unable to eat food or drink water without vomiting. She has been feeling weak as a result, and is concerned for her baby's health. Denies dysuria, fever/chills, vaginal bleeding.

## 2021-09-03 NOTE — ED PROVIDER NOTE - NS ED ROS FT
General: denies fever, chills  HENT: denies nasal congestion, rhinorrhea  Eyes: denies visual changes, blurred vision  CV: denies chest pain, palpitations  Resp: denies difficulty breathing, cough  Abdominal: + nausea, vomiting, LLQ abdominal pain  : denies urinary pain or discharge  MSK: denies muscle aches, leg swelling  Neuro: + headaches, denies numbness, tingling  Skin: denies rashes, bruises

## 2021-09-03 NOTE — ED PROVIDER NOTE - NSICDXPASTMEDICALHX_GEN_ALL_CORE_FT
PAST MEDICAL HISTORY:  Cervical cancer abnormal pap , s/p LEEP  11/2020 , no chemo , no radiation , denies oncologist , pt  planning  hysteretomy in the future    Hypertrophy of breast     Kidney donor 2012 stable renal function    Mammogram abnormal s/p breast bx of both breasts 12/2020, as per patient - path was benign    Ovarian cancer x 10 years   Chemo x 6 month

## 2021-09-03 NOTE — ED PROVIDER NOTE - PHYSICAL EXAMINATION
GENERAL: well appearing in no acute distress, non-toxic appearing  HEAD: normocephalic, atraumatic  HEENT: normal conjunctiva, oral mucosa moist, uvula midline, no tonsilar exudates, neck supple, no JVD  CARDIAC: regular rate and rhythm, normal S1S2, no appreciable murmurs, 2+ pulses in UE/LE b/l  PULM: normal breath sounds, clear to ascultation bilaterally, no rales, rhonchi, wheezing  GI: Soft, nondistended, mild LLQ pain, no guarding, no rebound.   : no CVA tenderness b/l, no suprapubic tenderness  NEURO: no focal motor or sensory deficits, CN2-12 intact, normal speech, PERRLA, EOMI, normal gait, AAOx3  MSK: no peripheral edema, no calf tenderness b/l  SKIN: well-perfused, extremities warm, no visible rashes  PSYCH: appropriate mood and affect

## 2021-09-04 NOTE — ED ADULT NURSE REASSESSMENT NOTE - NS ED NURSE REASSESS COMMENT FT1
Patient called multiple times on emergency contact, and home phone. patient has no pickup. 911 called for wellness check.

## 2021-09-04 NOTE — ED ADULT NURSE REASSESSMENT NOTE - NS ED NURSE REASSESS COMMENT FT1
Patient's name called overhead with no response. Pt's number "[phone]" called x3 with no response. Patient's name called overhead with no response. Pt's number "[phone]" called three times with no response. MD Everett made aware, charge nurse notified.

## 2021-09-04 NOTE — ED ADULT NURSE REASSESSMENT NOTE - NS ED NURSE REASSESS COMMENT FT1
[employer] directed to call [facility] at . number called however was unable to reach any contact. [facility] nurse manager made aware.

## 2021-09-05 LAB
CULTURE RESULTS: NO GROWTH — SIGNIFICANT CHANGE UP
SPECIMEN SOURCE: SIGNIFICANT CHANGE UP

## 2021-09-06 LAB
CULTURE RESULTS: SIGNIFICANT CHANGE UP
SPECIMEN SOURCE: SIGNIFICANT CHANGE UP

## 2021-09-10 ENCOUNTER — NON-APPOINTMENT (OUTPATIENT)
Age: 36
End: 2021-09-10

## 2021-09-11 ENCOUNTER — APPOINTMENT (OUTPATIENT)
Dept: ANTEPARTUM | Facility: CLINIC | Age: 36
End: 2021-09-11
Payer: COMMERCIAL

## 2021-09-11 PROCEDURE — 76813 OB US NUCHAL MEAS 1 GEST: CPT

## 2021-09-15 ENCOUNTER — APPOINTMENT (OUTPATIENT)
Dept: OBGYN | Facility: CLINIC | Age: 36
End: 2021-09-15
Payer: COMMERCIAL

## 2021-09-15 VITALS — WEIGHT: 166 LBS | BODY MASS INDEX: 26 KG/M2 | SYSTOLIC BLOOD PRESSURE: 113 MMHG | DIASTOLIC BLOOD PRESSURE: 74 MMHG

## 2021-09-15 PROCEDURE — 0502F SUBSEQUENT PRENATAL CARE: CPT

## 2021-09-15 PROCEDURE — 76817 TRANSVAGINAL US OBSTETRIC: CPT

## 2021-09-15 PROCEDURE — 76801 OB US < 14 WKS SINGLE FETUS: CPT

## 2021-09-16 LAB
APPEARANCE: CLEAR
BACTERIA: NEGATIVE
BILIRUBIN URINE: NEGATIVE
BLOOD URINE: NEGATIVE
COLOR: NORMAL
GLUCOSE QUALITATIVE U: NEGATIVE
HYALINE CASTS: 0 /LPF
KETONES URINE: NEGATIVE
LEUKOCYTE ESTERASE URINE: NEGATIVE
MICROSCOPIC-UA: NORMAL
NITRITE URINE: NEGATIVE
PH URINE: 6
PROTEIN URINE: NORMAL
RED BLOOD CELLS URINE: 2 /HPF
SPECIFIC GRAVITY URINE: 1.03
SQUAMOUS EPITHELIAL CELLS: 3 /HPF
UROBILINOGEN URINE: NORMAL
WHITE BLOOD CELLS URINE: 1 /HPF

## 2021-09-20 LAB — BACTERIA UR CULT: NORMAL

## 2021-09-30 ENCOUNTER — APPOINTMENT (OUTPATIENT)
Dept: OBGYN | Facility: CLINIC | Age: 36
End: 2021-09-30
Payer: COMMERCIAL

## 2021-09-30 VITALS
SYSTOLIC BLOOD PRESSURE: 124 MMHG | DIASTOLIC BLOOD PRESSURE: 79 MMHG | BODY MASS INDEX: 26.06 KG/M2 | WEIGHT: 166 LBS | HEIGHT: 67 IN

## 2021-09-30 PROCEDURE — 76815 OB US LIMITED FETUS(S): CPT

## 2021-09-30 PROCEDURE — 0502F SUBSEQUENT PRENATAL CARE: CPT

## 2021-09-30 PROCEDURE — ZZZZZ: CPT

## 2021-10-10 ENCOUNTER — LABORATORY RESULT (OUTPATIENT)
Age: 36
End: 2021-10-10

## 2021-10-11 ENCOUNTER — APPOINTMENT (OUTPATIENT)
Dept: OBGYN | Facility: CLINIC | Age: 36
End: 2021-10-11

## 2021-10-11 VITALS — WEIGHT: 169 LBS | BODY MASS INDEX: 26.47 KG/M2

## 2021-10-11 VITALS — DIASTOLIC BLOOD PRESSURE: 77 MMHG | SYSTOLIC BLOOD PRESSURE: 120 MMHG

## 2021-10-11 DIAGNOSIS — Z3A.16 16 WEEKS GESTATION OF PREGNANCY: ICD-10-CM

## 2021-10-25 ENCOUNTER — APPOINTMENT (OUTPATIENT)
Dept: OBGYN | Facility: CLINIC | Age: 36
End: 2021-10-25
Payer: COMMERCIAL

## 2021-10-25 VITALS — WEIGHT: 169 LBS | BODY MASS INDEX: 26.47 KG/M2

## 2021-10-25 DIAGNOSIS — Z00.00 ENCOUNTER FOR GENERAL ADULT MEDICAL EXAMINATION W/OUT ABNORMAL FINDINGS: ICD-10-CM

## 2021-10-25 PROCEDURE — 0502F SUBSEQUENT PRENATAL CARE: CPT

## 2021-10-25 PROCEDURE — 76815 OB US LIMITED FETUS(S): CPT

## 2021-10-25 PROCEDURE — 76817 TRANSVAGINAL US OBSTETRIC: CPT

## 2021-10-31 ENCOUNTER — OUTPATIENT (OUTPATIENT)
Dept: INPATIENT UNIT | Facility: HOSPITAL | Age: 36
LOS: 1 days | Discharge: HOME | End: 2021-10-31
Payer: COMMERCIAL

## 2021-10-31 VITALS
DIASTOLIC BLOOD PRESSURE: 56 MMHG | HEART RATE: 75 BPM | RESPIRATION RATE: 16 BRPM | TEMPERATURE: 98 F | SYSTOLIC BLOOD PRESSURE: 96 MMHG

## 2021-10-31 DIAGNOSIS — Z98.890 OTHER SPECIFIED POSTPROCEDURAL STATES: Chronic | ICD-10-CM

## 2021-10-31 DIAGNOSIS — Z90.5 ACQUIRED ABSENCE OF KIDNEY: Chronic | ICD-10-CM

## 2021-10-31 DIAGNOSIS — Z98.89 OTHER SPECIFIED POSTPROCEDURAL STATES: Chronic | ICD-10-CM

## 2021-10-31 DIAGNOSIS — Z3A.00 WEEKS OF GESTATION OF PREGNANCY NOT SPECIFIED: ICD-10-CM

## 2021-10-31 DIAGNOSIS — O26.899 OTHER SPECIFIED PREGNANCY RELATED CONDITIONS, UNSPECIFIED TRIMESTER: ICD-10-CM

## 2021-10-31 DIAGNOSIS — Z90.49 ACQUIRED ABSENCE OF OTHER SPECIFIED PARTS OF DIGESTIVE TRACT: Chronic | ICD-10-CM

## 2021-10-31 PROCEDURE — 99213 OFFICE O/P EST LOW 20 MIN: CPT

## 2021-10-31 PROCEDURE — 76815 OB US LIMITED FETUS(S): CPT | Mod: 26

## 2021-10-31 NOTE — OB PROVIDER TRIAGE NOTE - HISTORY OF PRESENT ILLNESS
36 year old female P3 at 19.1weeks gestation who presents stating that she has not felt fm since yesterday and also experienced what she describes as CP / heart burn  after eating meal today stated CP resolved after 10 minutes    denied any further CP upon arrival denied any nausea emesis fever chills    stated hx of prior CS x3  abdominal plasty  2018   denied any VB   stated has scheduled Anatomy scan 11/13

## 2021-10-31 NOTE — OB PROVIDER TRIAGE NOTE - NSHPPHYSICALEXAM_GEN_ALL_CORE
pt seen upon arrival   scan positive  FHR and FM    pt  in NAD   lungs clear   heart s1 s2   abd soft  scar noted from prior surgeries   abd scan  vtx positive fhr fm noted   pt sees fm during scan

## 2021-10-31 NOTE — OB PROVIDER TRIAGE NOTE - NSOBPROVIDERNOTE_OBGYN_ALL_OB_FT
36 year old female P3 at 19.1 weeks  decreased fm   now feeling FM and positive FHR/ FM  on scan    case d/w Dr alcantar   discharge home   advised Pepcid/tums for GERD s/s

## 2021-11-13 ENCOUNTER — APPOINTMENT (OUTPATIENT)
Dept: ANTEPARTUM | Facility: CLINIC | Age: 36
End: 2021-11-13
Payer: COMMERCIAL

## 2021-11-13 VITALS
WEIGHT: 173 LBS | BODY MASS INDEX: 27.15 KG/M2 | SYSTOLIC BLOOD PRESSURE: 126 MMHG | DIASTOLIC BLOOD PRESSURE: 83 MMHG | HEIGHT: 67 IN

## 2021-11-13 PROCEDURE — 76811 OB US DETAILED SNGL FETUS: CPT

## 2021-11-29 ENCOUNTER — APPOINTMENT (OUTPATIENT)
Dept: ANTEPARTUM | Facility: CLINIC | Age: 36
End: 2021-11-29
Payer: COMMERCIAL

## 2021-11-29 ENCOUNTER — APPOINTMENT (OUTPATIENT)
Dept: OBGYN | Facility: CLINIC | Age: 36
End: 2021-11-29
Payer: COMMERCIAL

## 2021-11-29 ENCOUNTER — APPOINTMENT (OUTPATIENT)
Dept: ANTEPARTUM | Facility: CLINIC | Age: 36
End: 2021-11-29

## 2021-11-29 ENCOUNTER — APPOINTMENT (OUTPATIENT)
Dept: OBGYN | Facility: CLINIC | Age: 36
End: 2021-11-29

## 2021-11-29 PROCEDURE — 0501F PRENATAL FLOW SHEET: CPT

## 2021-11-29 PROCEDURE — 0502F SUBSEQUENT PRENATAL CARE: CPT

## 2021-12-18 ENCOUNTER — APPOINTMENT (OUTPATIENT)
Dept: OBGYN | Facility: CLINIC | Age: 36
End: 2021-12-18

## 2021-12-18 VITALS
DIASTOLIC BLOOD PRESSURE: 72 MMHG | HEIGHT: 67 IN | BODY MASS INDEX: 28.09 KG/M2 | SYSTOLIC BLOOD PRESSURE: 115 MMHG | WEIGHT: 179 LBS

## 2021-12-18 DIAGNOSIS — Z3A.24 24 WEEKS GESTATION OF PREGNANCY: ICD-10-CM

## 2021-12-20 ENCOUNTER — NON-APPOINTMENT (OUTPATIENT)
Age: 36
End: 2021-12-20

## 2021-12-20 LAB
BASOPHILS # BLD AUTO: 0.03 K/UL
BASOPHILS NFR BLD AUTO: 0.3 %
EOSINOPHIL # BLD AUTO: 0.05 K/UL
EOSINOPHIL NFR BLD AUTO: 0.5 %
GLUCOSE 1H P 50 G GLC PO SERPL-MCNC: 148 MG/DL
HCT VFR BLD CALC: 36.3 %
HGB BLD-MCNC: 12 G/DL
IMM GRANULOCYTES NFR BLD AUTO: 0.5 %
LYMPHOCYTES # BLD AUTO: 1.58 K/UL
LYMPHOCYTES NFR BLD AUTO: 14.6 %
MAN DIFF?: NORMAL
MCHC RBC-ENTMCNC: 31.6 PG
MCHC RBC-ENTMCNC: 33.1 GM/DL
MCV RBC AUTO: 95.5 FL
MONOCYTES # BLD AUTO: 0.34 K/UL
MONOCYTES NFR BLD AUTO: 3.1 %
NEUTROPHILS # BLD AUTO: 8.75 K/UL
NEUTROPHILS NFR BLD AUTO: 81 %
PLATELET # BLD AUTO: 253 K/UL
RBC # BLD: 3.8 M/UL
RBC # FLD: 14.2 %
WBC # FLD AUTO: 10.8 K/UL

## 2021-12-24 ENCOUNTER — LABORATORY RESULT (OUTPATIENT)
Age: 36
End: 2021-12-24

## 2021-12-28 ENCOUNTER — APPOINTMENT (OUTPATIENT)
Dept: OBGYN | Facility: CLINIC | Age: 36
End: 2021-12-28

## 2021-12-28 ENCOUNTER — OUTPATIENT (OUTPATIENT)
Dept: INPATIENT UNIT | Facility: HOSPITAL | Age: 36
LOS: 1 days | Discharge: HOME | End: 2021-12-28
Payer: COMMERCIAL

## 2021-12-28 VITALS — DIASTOLIC BLOOD PRESSURE: 70 MMHG | SYSTOLIC BLOOD PRESSURE: 111 MMHG

## 2021-12-28 DIAGNOSIS — Z98.89 OTHER SPECIFIED POSTPROCEDURAL STATES: Chronic | ICD-10-CM

## 2021-12-28 DIAGNOSIS — Z98.890 OTHER SPECIFIED POSTPROCEDURAL STATES: Chronic | ICD-10-CM

## 2021-12-28 DIAGNOSIS — Z90.5 ACQUIRED ABSENCE OF KIDNEY: Chronic | ICD-10-CM

## 2021-12-28 DIAGNOSIS — O26.899 OTHER SPECIFIED PREGNANCY RELATED CONDITIONS, UNSPECIFIED TRIMESTER: ICD-10-CM

## 2021-12-28 DIAGNOSIS — Z90.49 ACQUIRED ABSENCE OF OTHER SPECIFIED PARTS OF DIGESTIVE TRACT: Chronic | ICD-10-CM

## 2021-12-28 DIAGNOSIS — Z3A.00 WEEKS OF GESTATION OF PREGNANCY NOT SPECIFIED: ICD-10-CM

## 2021-12-28 LAB
ALBUMIN SERPL ELPH-MCNC: 3.3 G/DL — SIGNIFICANT CHANGE UP (ref 3.3–5)
ALP SERPL-CCNC: 80 U/L — SIGNIFICANT CHANGE UP (ref 40–120)
ALT FLD-CCNC: 22 U/L — SIGNIFICANT CHANGE UP (ref 4–33)
ANION GAP SERPL CALC-SCNC: 9 MMOL/L — SIGNIFICANT CHANGE UP (ref 7–14)
APPEARANCE UR: CLEAR — SIGNIFICANT CHANGE UP
AST SERPL-CCNC: 24 U/L — SIGNIFICANT CHANGE UP (ref 4–32)
BACTERIA # UR AUTO: ABNORMAL
BASOPHILS # BLD AUTO: 0.02 K/UL — SIGNIFICANT CHANGE UP (ref 0–0.2)
BASOPHILS NFR BLD AUTO: 0.3 % — SIGNIFICANT CHANGE UP (ref 0–2)
BILIRUB SERPL-MCNC: <0.2 MG/DL — SIGNIFICANT CHANGE UP (ref 0.2–1.2)
BILIRUB UR-MCNC: NEGATIVE — SIGNIFICANT CHANGE UP
BUN SERPL-MCNC: 10 MG/DL — SIGNIFICANT CHANGE UP (ref 7–23)
CALCIUM SERPL-MCNC: 8.4 MG/DL — SIGNIFICANT CHANGE UP (ref 8.4–10.5)
CHLORIDE SERPL-SCNC: 102 MMOL/L — SIGNIFICANT CHANGE UP (ref 98–107)
CO2 SERPL-SCNC: 22 MMOL/L — SIGNIFICANT CHANGE UP (ref 22–31)
COLOR SPEC: YELLOW — SIGNIFICANT CHANGE UP
CREAT SERPL-MCNC: 0.96 MG/DL — SIGNIFICANT CHANGE UP (ref 0.5–1.3)
DIFF PNL FLD: NEGATIVE — SIGNIFICANT CHANGE UP
EOSINOPHIL # BLD AUTO: 0.01 K/UL — SIGNIFICANT CHANGE UP (ref 0–0.5)
EOSINOPHIL NFR BLD AUTO: 0.1 % — SIGNIFICANT CHANGE UP (ref 0–6)
EPI CELLS # UR: 9 /HPF — HIGH (ref 0–5)
FIBRINOGEN PPP-MCNC: 467 MG/DL — SIGNIFICANT CHANGE UP (ref 290–520)
GLUCOSE SERPL-MCNC: 92 MG/DL — SIGNIFICANT CHANGE UP (ref 70–99)
GLUCOSE UR QL: NEGATIVE — SIGNIFICANT CHANGE UP
HCT VFR BLD CALC: 34.3 % — LOW (ref 34.5–45)
HGB BLD-MCNC: 11.5 G/DL — SIGNIFICANT CHANGE UP (ref 11.5–15.5)
HYALINE CASTS # UR AUTO: 0 /LPF — SIGNIFICANT CHANGE UP (ref 0–7)
IANC: 5.97 K/UL — SIGNIFICANT CHANGE UP (ref 1.5–8.5)
IMM GRANULOCYTES NFR BLD AUTO: 0.3 % — SIGNIFICANT CHANGE UP (ref 0–1.5)
INR BLD: 1.08 RATIO — SIGNIFICANT CHANGE UP (ref 0.88–1.16)
KETONES UR-MCNC: ABNORMAL
LEUKOCYTE ESTERASE UR-ACNC: ABNORMAL
LYMPHOCYTES # BLD AUTO: 0.55 K/UL — LOW (ref 1–3.3)
LYMPHOCYTES # BLD AUTO: 7.7 % — LOW (ref 13–44)
MCHC RBC-ENTMCNC: 30.7 PG — SIGNIFICANT CHANGE UP (ref 27–34)
MCHC RBC-ENTMCNC: 33.5 GM/DL — SIGNIFICANT CHANGE UP (ref 32–36)
MCV RBC AUTO: 91.5 FL — SIGNIFICANT CHANGE UP (ref 80–100)
MONOCYTES # BLD AUTO: 0.6 K/UL — SIGNIFICANT CHANGE UP (ref 0–0.9)
MONOCYTES NFR BLD AUTO: 8.4 % — SIGNIFICANT CHANGE UP (ref 2–14)
NEUTROPHILS # BLD AUTO: 5.97 K/UL — SIGNIFICANT CHANGE UP (ref 1.8–7.4)
NEUTROPHILS NFR BLD AUTO: 83.2 % — HIGH (ref 43–77)
NITRITE UR-MCNC: NEGATIVE — SIGNIFICANT CHANGE UP
NRBC # BLD: 0 /100 WBCS — SIGNIFICANT CHANGE UP
NRBC # FLD: 0 K/UL — SIGNIFICANT CHANGE UP
PH UR: 6.5 — SIGNIFICANT CHANGE UP (ref 5–8)
PLATELET # BLD AUTO: 212 K/UL — SIGNIFICANT CHANGE UP (ref 150–400)
POTASSIUM SERPL-MCNC: 3.5 MMOL/L — SIGNIFICANT CHANGE UP (ref 3.5–5.3)
POTASSIUM SERPL-SCNC: 3.5 MMOL/L — SIGNIFICANT CHANGE UP (ref 3.5–5.3)
PROT SERPL-MCNC: 6.3 G/DL — SIGNIFICANT CHANGE UP (ref 6–8.3)
PROT UR-MCNC: ABNORMAL
PROTHROM AB SERPL-ACNC: 12.3 SEC — SIGNIFICANT CHANGE UP (ref 10.6–13.6)
RBC # BLD: 3.75 M/UL — LOW (ref 3.8–5.2)
RBC # FLD: 13.6 % — SIGNIFICANT CHANGE UP (ref 10.3–14.5)
RBC CASTS # UR COMP ASSIST: 1 /HPF — SIGNIFICANT CHANGE UP (ref 0–4)
SODIUM SERPL-SCNC: 133 MMOL/L — LOW (ref 135–145)
SP GR SPEC: 1.03 — SIGNIFICANT CHANGE UP (ref 1–1.05)
UROBILINOGEN FLD QL: SIGNIFICANT CHANGE UP
WBC # BLD: 7.17 K/UL — SIGNIFICANT CHANGE UP (ref 3.8–10.5)
WBC # FLD AUTO: 7.17 K/UL — SIGNIFICANT CHANGE UP (ref 3.8–10.5)
WBC UR QL: 4 /HPF — SIGNIFICANT CHANGE UP (ref 0–5)

## 2021-12-28 RX ORDER — SODIUM CHLORIDE 9 MG/ML
1000 INJECTION, SOLUTION INTRAVENOUS ONCE
Refills: 0 | Status: COMPLETED | OUTPATIENT
Start: 2021-12-28 | End: 2021-12-28

## 2021-12-28 RX ORDER — ACETAMINOPHEN 500 MG
975 TABLET ORAL ONCE
Refills: 0 | Status: COMPLETED | OUTPATIENT
Start: 2021-12-28 | End: 2021-12-28

## 2021-12-28 RX ORDER — METOCLOPRAMIDE HCL 10 MG
10 TABLET ORAL ONCE
Refills: 0 | Status: COMPLETED | OUTPATIENT
Start: 2021-12-28 | End: 2021-12-28

## 2021-12-28 RX ORDER — SODIUM CHLORIDE 9 MG/ML
1000 INJECTION, SOLUTION INTRAVENOUS
Refills: 0 | Status: DISCONTINUED | OUTPATIENT
Start: 2021-12-28 | End: 2022-01-12

## 2021-12-28 RX ADMIN — SODIUM CHLORIDE 1000 MILLILITER(S): 9 INJECTION, SOLUTION INTRAVENOUS at 19:35

## 2021-12-28 RX ADMIN — Medication 10 MILLIGRAM(S): at 19:57

## 2021-12-28 RX ADMIN — Medication 975 MILLIGRAM(S): at 19:56

## 2021-12-28 NOTE — OB RN TRIAGE NOTE - FALL HARM RISK - UNIVERSAL INTERVENTIONS
Bed in lowest position, wheels locked, appropriate side rails in place/Call bell, personal items and telephone in reach/Instruct patient to call for assistance before getting out of bed or chair/Non-slip footwear when patient is out of bed/[place] to call system/Physically safe environment - no spills, clutter or unnecessary equipment/Purposeful Proactive Rounding/Room/bathroom lighting operational, light cord in reach

## 2021-12-29 VITALS — DIASTOLIC BLOOD PRESSURE: 50 MMHG | SYSTOLIC BLOOD PRESSURE: 94 MMHG | HEART RATE: 93 BPM

## 2021-12-29 LAB

## 2021-12-29 PROCEDURE — 76818 FETAL BIOPHYS PROFILE W/NST: CPT | Mod: 26

## 2021-12-29 NOTE — OB PROVIDER TRIAGE NOTE - NSHPPHYSICALEXAM_GEN_ALL_CORE
ICU Vital Signs Last 24 Hrs  T(C): 36.2 (28 Dec 2021 22:17), Max: 38.9 (28 Dec 2021 19:43)  T(F): 97.16 (28 Dec 2021 22:17), Max: 102.1 (28 Dec 2021 19:43)  HR: 88 (28 Dec 2021 22:22) (88 - 125)  BP: 93/50 (28 Dec 2021 22:22) (93/50 - 111/70)  BP(mean): --  ABP: --  ABP(mean): --  RR: 20 (28 Dec 2021 19:43) (20 - 20)  SpO2: 95% (28 Dec 2021 22:16) (93% - 97%)    Abdomen soft nontender  TAS: Cephalic presentation anterior placenta, MVP: 4.95, EFW: 717gms, BPP: 8/8 ICU Vital Signs Last 24 Hrs  T(C): 36.2 (28 Dec 2021 22:17), Max: 38.9 (28 Dec 2021 19:43)  T(F): 97.16 (28 Dec 2021 22:17), Max: 102.1 (28 Dec 2021 19:43)  HR: 88 (28 Dec 2021 22:22) (88 - 125)  BP: 93/50 (28 Dec 2021 22:22) (93/50 - 111/70)  BP(mean): --  ABP: --  ABP(mean): --  RR: 20 (28 Dec 2021 19:43) (20 - 20)  SpO2: 95% (28 Dec 2021 22:16) (93% - 97%)    Abdomen soft nontender  TAS: Cephalic presentation anterior placenta, MVP: 4.95, EFW: 717gms, BPP: 8/8  Speculum: Cervix appears closed  TVS: CL: 3.1 No funneling or dynamic changes noted.  FHR: 135bpm, moderate variability, accels, @1955 variable decels noted   No contractions on TOCO

## 2021-12-29 NOTE — OB PROVIDER TRIAGE NOTE - NSOBPROVIDERNOTE_OBGYN_ALL_OB_FT
Discussed findings with Dr. Faust. Pt. d/c'd home. Pt. instructed to return to the ED if symptoms worsens. Pt. encourage Tylenol as needed and increase PO hydration CBC, CMP, coags, Rapid RVP, UA ordered   1L RL and 975mg of Tylenol PO ordered     Repeat rectal temp @22:17 36.2     Discussed findings with Dr. Faust. Pt. d/c'd home. Pt. instructed to return to the ED if symptoms worsens. Pt. encourage Tylenol as needed and increase PO hydration

## 2021-12-29 NOTE — OB PROVIDER TRIAGE NOTE - HISTORY OF PRESENT ILLNESS
Pt. is a 35y/o  EGA 27.3wks reports of a headache, coughing, nasal congestion, fever of 101.4 at home, nausea, and pelvic pressure and pelvic pain. Pt. denies leakage of fluid and vaginal bleeding. Pt. reports good fetal movement.     AP: Denies    Medical Hx: Denies    Surgical Hx:   Left kidney donor   B/l knee repair   Right shoulder rotator cuff repair 2015  Appendectomy 2017  Abdominoplasty 2018     OBGYN Hx:    2005 7#1  Primary  2007 7#0 for placenta previa   2012 7#0    Meds: PNV    Allergies: Morphine: Anaphylaxis   PCN- Hives

## 2021-12-29 NOTE — OB PROVIDER TRIAGE NOTE - ADDITIONAL INSTRUCTIONS
Pt. d/c'd home. Pt. instructed to return to the ED if symptoms worsens. Pt. encourage Tylenol as needed and increase PO hydration Pt. d/c'd home. Pt. instructed to return to the ED if symptoms worsens, SOB, fever unresolved with Tylenol. Pt. encourage Tylenol as needed and increase PO hydration

## 2021-12-29 NOTE — OB PROVIDER TRIAGE NOTE - NSHPLABSRESULTS_GEN_ALL_CORE
11.5   7.17  )-----------( 212      ( 28 Dec 2021 20:14 )             34.3         133<L>  |  102  |  10  ----------------------------<  92  3.5   |  22  |  0.96    Ca    8.4      28 Dec 2021 20:13    TPro  6.3  /  Alb  3.3  /  TBili  <0.2  /  DBili  x   /  AST  24  /  ALT  22  /  AlkPhos  80      Urinalysis Basic - ( 28 Dec 2021 20:13 )    Color: Yellow / Appearance: Clear / S.032 / pH: x  Gluc: x / Ketone: Small  / Bili: Negative / Urobili: <2 mg/dL   Blood: x / Protein: 30 mg/dL / Nitrite: Negative   Leuk Esterase: Moderate / RBC: 1 /HPF / WBC 4 /HPF   Sq Epi: x / Non Sq Epi: 9 /HPF / Bacteria: Few

## 2021-12-29 NOTE — CHART NOTE - NSCHARTNOTEFT_GEN_A_CORE
12/29/2021 1210:  Patient called unit for covid results obtained last evening  COVID positive  Patient instructed to stay hydrated, may take tylenol.  Go to ED for SOB or chest pain.   [place] for 10 days    NAM Valdez NP

## 2021-12-30 LAB
CULTURE RESULTS: SIGNIFICANT CHANGE UP
SPECIMEN SOURCE: SIGNIFICANT CHANGE UP

## 2022-01-08 ENCOUNTER — APPOINTMENT (OUTPATIENT)
Dept: OBGYN | Facility: CLINIC | Age: 37
End: 2022-01-08

## 2022-01-08 ENCOUNTER — APPOINTMENT (OUTPATIENT)
Dept: ANTEPARTUM | Facility: CLINIC | Age: 37
End: 2022-01-08

## 2022-01-08 ENCOUNTER — APPOINTMENT (OUTPATIENT)
Dept: ANTEPARTUM | Facility: CLINIC | Age: 37
End: 2022-01-08
Payer: COMMERCIAL

## 2022-01-08 PROCEDURE — 76819 FETAL BIOPHYS PROFIL W/O NST: CPT

## 2022-01-08 PROCEDURE — 76816 OB US FOLLOW-UP PER FETUS: CPT

## 2022-01-08 PROCEDURE — XXXXX: CPT | Mod: 1L

## 2022-01-08 PROCEDURE — 76820 UMBILICAL ARTERY ECHO: CPT

## 2022-01-26 ENCOUNTER — APPOINTMENT (OUTPATIENT)
Dept: OBGYN | Facility: CLINIC | Age: 37
End: 2022-01-26
Payer: COMMERCIAL

## 2022-01-26 ENCOUNTER — APPOINTMENT (OUTPATIENT)
Dept: ANTEPARTUM | Facility: CLINIC | Age: 37
End: 2022-01-26
Payer: COMMERCIAL

## 2022-01-26 VITALS
WEIGHT: 185 LBS | HEIGHT: 67 IN | DIASTOLIC BLOOD PRESSURE: 65 MMHG | BODY MASS INDEX: 29.03 KG/M2 | SYSTOLIC BLOOD PRESSURE: 100 MMHG

## 2022-01-26 PROCEDURE — 76818 FETAL BIOPHYS PROFILE W/NST: CPT

## 2022-01-26 PROCEDURE — 76816 OB US FOLLOW-UP PER FETUS: CPT

## 2022-01-26 PROCEDURE — 0502F SUBSEQUENT PRENATAL CARE: CPT

## 2022-01-26 PROCEDURE — 76820 UMBILICAL ARTERY ECHO: CPT

## 2022-01-27 LAB
ALBUMIN SERPL ELPH-MCNC: 3.5 G/DL
ALP BLD-CCNC: 90 U/L
ALT SERPL-CCNC: 21 U/L
AST SERPL-CCNC: 17 U/L
BILIRUB DIRECT SERPL-MCNC: 0 MG/DL
BILIRUB INDIRECT SERPL-MCNC: NORMAL MG/DL
BILIRUB SERPL-MCNC: 0.2 MG/DL
PROT SERPL-MCNC: 6.2 G/DL

## 2022-01-31 ENCOUNTER — APPOINTMENT (OUTPATIENT)
Dept: ANTEPARTUM | Facility: CLINIC | Age: 37
End: 2022-01-31
Payer: COMMERCIAL

## 2022-01-31 LAB — BILE AC SER-MCNC: 9 UMOL/L

## 2022-01-31 PROCEDURE — 76820 UMBILICAL ARTERY ECHO: CPT

## 2022-01-31 PROCEDURE — 76818 FETAL BIOPHYS PROFILE W/NST: CPT

## 2022-02-02 NOTE — ASU PREOP CHECKLIST - SIDE RAILS UP
Follow up  NOTE - NEPHROLOGY    Patient: Kirstin Lemus Date: 2022   : 1960 Attending: Cassy Berman*   61 year old female      CHIEF COMPLAINT    Acute kidney injury /CKD IIIb     HISTORY OF PRESENT ILLNESS    61 year old  with past medical history significant for hypertension, diabetes mellitus with diabetic nephropathy and CKD III a at baseline crt 1.52 on glipizide, Tradjenta, Metformin, presents with transient altered mental status. She was found to be hypoglycemic . She  Is known to have CKDIII with a creatinine of 1.52 in . Today she is without complaints. No nausea vomiting fevers or chills. No diarrhea.  Her diabetes medications are being adjusted. .  Denies any abdominal pain, nausea vomiting, chest pain, shortness of breath, headache, dizziness.  Patient states that she feels much better now.  Patient does admit that she did not eat dinner tonight.  No recent illnesses.  Denies any recent medication changes. Her entry creatinine is at 1.46 .        The history is provided by the patient and the EMS personnel.     S; no acute complaints now     Medications/Infusions:  Scheduled:   • magnesium oxide  400 mg Oral Once   • sodium chloride (PF)  2 mL Intracatheter 2 times per day   • Potassium Standard Replacement Protocol   Does not apply See Admin Instructions   • Magnesium Standard Replacement Protocol   Does not apply See Admin Instructions   • heparin (porcine)  5,000 Units Subcutaneous 3 times per day   • insulin lispro   Subcutaneous TID WC   • amLODIPine  10 mg Oral Daily   • atorvastatin  40 mg Oral Daily       Continuous Infusions:     Home medication              Medications  Reconcile with Patient's Chart    Name Status Start Date Stop Date       amlodipine 10 mg tablet  Take 1 tablet every day by oral route for 30 days. Active   Not available     amlodipine 5 mg tablet  TAKE 1 TABLET BY MOUTH ONCE DAILY Active   Not available     atorvastatin 40 mg tablet  TAKE 1  TABLET BY MOUTH ONCE DAILY Active   Not available     Bactrim  mg-160 mg tablet  take 1 tablet by oral route every 12 hours Completed 01/05/2015 01/12/2015     Diflucan 150 mg tablet  Take 1 tablet every 72 hours by oral route. Active   Not available     ergocalciferol (vitamin D2) 1,250 mcg (50,000 unit) capsule  TAKE 1 CAPSULE BY MOUTH ONCE A WEEK IN THE MORNING Active   Not available     glipizide 10 mg tablet  TAKE 1 TABLET BY MOUTH TWICE DAILY WITH MEALS Active   Not available     glipizide 5 mg tablet  Take 1 tablet twice a day by oral route. Active   Not available     lancets Active 01/12/2015 Not available     lisinopril 20 mg tablet  take 1 tablet by oral route every day Completed   08/22/2017     lisinopril 40 mg tablet  TAKE 1 TABLET BY MOUTH ONCE DAILY Active   Not available     Lotrimin Ultra 1 % topical cream  apply by topical route every day to the affected and surrounding areas of skin Completed 05/04/2015 05/12/2015     lovastatin 20 mg tablet  take 1 tablet by oral route every day with the evening meal Active   Not available     metformin 1,000 mg tablet  TAKE 1 TABLET BY MOUTH TWICE DAILY Active   Not available     metformin 500 mg tablet  take 1 tablet by oral route 2 times every day with morning and evening meals Completed 04/22/2016 10/05/2016     metformin 850 mg tablet  Take 1 tablet twice a day by oral route. Completed   08/30/2017     Metrogel Vaginal 0.75 %  Insert 1 applicatorful every day by vaginal route. Active   Not available     Pyridium 200 mg tablet  take 1 tablet by oral route 2 times every day after meals Completed 01/05/2015 01/07/2015     simvastatin 20 mg tablet  Take 1 tablet every day by oral route. Active   Not available     Test N'Go Blood Glucose System  take by test route 2 times every day Completed 01/05/2015 12/23/2015     Vitamin D3 125 mcg (5,000 unit) tablet  Take 1 tablet every day by oral route. Active   Not available     Vitamin D3 50 mcg (2,000 unit)  capsule  one PO daily Completed 2015           ALLERGIES  Allergies as of 2022   • (No Known Allergies)        HISTORIES  Past Medical History:   Diagnosis Date   • CKD stage 3 due to type 2 diabetes mellitus (CMS/HCC)    • Class 2 obesity due to excess calories in adult    • Diabetes mellitus (CMS/HCC)    • Essential (primary) hypertension    • Hyperlipidemia    • Hypertension          Past Surgical History:   Procedure Laterality Date   •  section, classic     • No past surgeries     • Removal gallbladder         Family History   Problem Relation Age of Onset   • Diabetes Other        Social History     Socioeconomic History   • Marital status: Single     Spouse name: Not on file   • Number of children: Not on file   • Years of education: Not on file   • Highest education level: Not on file   Occupational History   • Not on file   Tobacco Use   • Smoking status: Never Smoker   • Smokeless tobacco: Never Used   Substance and Sexual Activity   • Alcohol use: Never   • Drug use: Never   • Sexual activity: Not on file   Other Topics Concern   • Not on file   Social History Narrative   • Not on file     Social Determinants of Health     Financial Resource Strain: Not on file   Food Insecurity: Not on file   Transportation Needs: Not on file   Physical Activity: Not on file   Stress: Not on file   Social Connections: Not on file   Intimate Partner Violence: Not At Risk   • Social Determinants: Intimate Partner Violence Past Fear: No   • Social Determinants: Intimate Partner Violence Current Fear: No      Review of Systems   All else 12 point review  Of system negative except for HPI.     Vital Last Value 24 Hour Range   Temperature 98.6 °F (37 °C) (22) Temp  Min: 97.2 °F (36.2 °C)  Max: 98.6 °F (37 °C)   Pulse 78 (22) Pulse  Min: 77  Max: 90   Respiratory 16 (22) Resp  Min: 16  Max: 20   Non-Invasive  Blood Pressure (!) 160/91 (22) BP  Min:  132/81  Max: 160/91   Arterial   Blood Pressure   No data recorded   Pulse Oximetry 99 % (02/02/22 0806) SpO2  Min: 98 %  Max: 100 %     Vital Today Admitted   Weight 88.7 kg (195 lb 8.8 oz) (01/31/22 1532) Weight: 88.7 kg (195 lb 8.8 oz) (01/31/22 1532)   Height N/A Height: 5' 3\" (160 cm) (01/31/22 1532)   BMI N/A BMI (Calculated): 34.64 (01/31/22 1532)     Weight over the past 48 Hours:  Patient Vitals for the past 48 hrs:   Weight   01/31/22 1532 88.7 kg (195 lb 8.8 oz)        Intake/Output:    Last Stool Occurrence:      No intake/output data recorded.    No intake/output data recorded.    No intake or output data in the 24 hours ending 02/02/22 1046    Physical Exam:    Physical Exam  Vitals and nursing note reviewed.   Constitutional:       Comments: normal appearing   HENT:      Head: Normocephalic and atraumatic.      Right Ear: External ear normal.      Left Ear: External ear normal.      Nose: Nose normal.      Mouth/Throat:      Mouth: Mucous membranes are moist.      Pharynx: Oropharynx is clear.   Eyes:      Extraocular Movements: Extraocular movements intact.      Cardiovascular:      Rate and Rhythm: Normal rate and regular rhythm.      Heart sounds: Normal heart sounds. No murmur. No gallop.    Pulmonary:      Effort: Pulmonary effort is normal.      Breath sounds: Normal breath sounds.   Chest:      Chest wall: No tenderness.   Abdominal:      General: Bowel sounds are normal. There is no distension.      Palpations: Abdomen is soft. Normal bowel sound     Tenderness: There is no abdominal tenderness. There is no guarding or rebound.   Genitourinary:     Comments: no CVA tenderness   Musculoskeletal:         General: No swelling or tenderness. Normal range of motion.      Cervical back: Normal range of motion and neck supple. No muscular tenderness.   Lymphadenopathy:      Cervical: No cervical adenopathy.   Skin:     General: Skin is warm and dry.      Coloration: Skin is normal .      Findings: No  lesion.   Neurological:      General: No focal deficit present. Cranial nerves normal grossly non focal      Mental Status: She is alert and oriented to person, place, and time.   Psychiatric:         Mood and Affect: Mood normal.         Behavior: Behavior normal      Laboratory Results:  Recent Labs     01/30/22  2339 01/31/22  0658 01/31/22  1357 01/31/22 2050 01/31/22  2349 02/01/22  0517 02/02/22  0500 02/02/22  0637   SODIUM 131* 130*   < >  --  134* 135  --  137   POTASSIUM 4.4 5.8*   < >  --  4.8 4.9  --  4.6   CHLORIDE 102 100   < >  --  101 105  --  104   CO2 17* 21   < >  --  24 23  --  22   ANIONGAP 16 15   < >  --  14 12  --  16   GLUCOSE 141* 131*   < >  --  147* 139*  --  162*   BUN 23* 23*   < >  --  26* 26*  --  28*   CREATININE 1.46* 1.56*   < >  --  1.74* 1.62*  --  1.54*   BCRAT 16 15   < >  --  15 16  --  18   CALCIUM 7.6* 8.8   < >  --  9.0 9.1  --  8.8   PHOS  --   --   --   --   --  4.4 3.6  --    MG  --   --   --   --   --  1.9 1.7  --    BILIRUBIN 0.3 0.5  --   --   --  0.3  --   --    AST 24 32  --   --   --  22  --   --    GPT 26 32  --   --   --  27  --   --    ALKPT 132* 173*  --   --   --  150*  --   --    TOTPROTEIN 7.4 9.2*  --  8.5*  --  8.0  --   --    ALBUMIN 3.1* 3.8  --   --   --  3.3*  --   --    GLOB 4.3* 5.4*  --   --   --  4.7*  --   --    AGR 0.7* 0.7*  --   --   --  0.7*  --   --     < > = values in this interval not displayed.        Recent Labs     01/31/22  0604 02/01/22  0517 02/02/22  0500   WBC 11.5* 11.2* 10.1   RBC 4.13 4.09 3.74*   HGB 11.9* 11.6* 10.8*   HCT 36.0 35.2* 33.0*    338 320   MCV 87.2 86.1 88.2   MCH 28.8 28.4 28.9   MCHC 33.1 33.0 32.7   NRBCRE 0 0 0            Lab Results   Component Value Date    SODIUM 137 02/02/2022    POTASSIUM 4.6 02/02/2022    CHLORIDE 104 02/02/2022    CO2 22 02/02/2022    ANIONGAP 16 02/02/2022    BUN 28 (H) 02/02/2022    CREATININE 1.54 (H) 02/02/2022    CALCIUM 8.8 02/02/2022    GLUCOSE 162 (H) 02/02/2022    LIPA  575 (H) 01/31/2022    WBC 10.1 02/02/2022    HCT 33.0 (L) 02/02/2022     Lab Results   Component Value Date    HGB 10.8 (L) 02/02/2022     02/02/2022    MG 1.7 02/02/2022    PHOS 3.6 02/02/2022    ALKPT 150 (H) 02/01/2022    BILIRUBIN 0.3 02/01/2022    C3 100 01/31/2022    C4 25.0 01/31/2022    AST 22 02/01/2022    GPT 27 02/01/2022    ALBUMIN 3.3 (L) 02/01/2022    LACTA 1.9 01/31/2022       Urine Panel  Lab Results   Component Value Date    UK 13.9 01/31/2022    5UNITR Negative 01/31/2022    UCL 33 01/31/2022    RICHARD 23 01/31/2022    UKET Negative 01/31/2022    USPG <=1.005 01/31/2022    UPROT 30  (A) 01/31/2022    UWBC Negative 01/31/2022    URBC Moderate (A) 01/31/2022    UBILI Negative 01/31/2022    UPH 6.0 01/31/2022    UTPELC 40 (H) 01/31/2022     Results for HENNA BURDICK (MRN [id number]) as of 2/1/2022 10:51   Ref. Range 1/31/2022 02:48 1/31/2022 20:39   COLOR Unknown  Yellow   CLARITY Unknown  Clear   GLUCOSE(URINE) Latest Ref Range: Negative mg/dL  100 (A)   KETONES Latest Ref Range: Negative mg/dL  Negative   PROTEIN(URINE) Latest Ref Range: Negative mg/dL  30 (A)   BLOOD Latest Ref Range: Negative   Moderate (A)   NITRITE Latest Ref Range: Negative   Negative   LEUKOCYTE ESTERASE Latest Ref Range: Negative   Negative   BILIRUBIN Latest Ref Range: Negative   Negative   UROBILINOGEN Latest Ref Range: 0.2, 1.0 mg/dL  0.2   pH Latest Ref Range: 5.0 - 7.0   6.0   SPECIFIC GRAVITY Latest Ref Range: 1.005 - 1.030   <=1.005   ERYTHROCYTES, URINALYSIS Latest Ref Range: None Seen, 1 to 2 /hpf  6 to 10 (A)   LEUKOCYTES, URINALYSIS Latest Ref Range: None Seen, 1 to 5 /hpf  None Seen   BACTERIA, URINALYSIS Latest Ref Range: None Seen /hpf  Few (A)   HYALINE CASTS, URINALYSIS Latest Ref Range: None Seen, 1 to 5 /lpf  None Seen   Squamous EPI'S Latest Ref Range: None Seen, 1 to 5 /hpf  1 to 5   MUCOUS Unknown  Present   TRIPLE PHOS CRYSTALS Unknown  Present   Urine Character Unknown Clear    Urine Bilirubin  Latest Ref Range: Negative  Negative    Urine Color Unknown Yellow    Urine Glucose Latest Ref Range: Negative mg/dL Negative    URINE KETONES Latest Ref Range: Negative mg/dL Negative    Urine Nitrite Latest Ref Range: Negative  Negative    Urine Blood Latest Ref Range: Negative  Moderate (A)    Urine PH Latest Ref Range: 5.0 - 7.0 Units 5.5    Urine Protein Latest Ref Range: Negative mg/dL 30 (A)    Urine Specific Gravity Latest Ref Range: 1.005 - 1.030 NULL 1.010    POCT Urobilinogen Latest Ref Range: 0.2, 1.0 mg/dL 0.2    WBC (Leukocyte) Esterase POC Latest Ref Range: Negative  Negative    CHLORIDE, URINE (TOTAL) Latest Units: mmol/L  33   CREATININE, URINE (TOTAL) Latest Units: mg/dL  18.74   PROTEIN, URINE (TOTAL) Latest Ref Range: <12 mg/dL  40 (H)   PROTEIN/CREATININE RATIO Latest Ref Range: <=199 mgPR/gCR  2,134 (H)   Results for HENNA BURDICK (MRN [id number]) as of 2/1/2022 10:51   Ref. Range 1/31/2022 02:48 1/31/2022 20:39   CHLORIDE, URINE (TOTAL) Latest Units: mmol/L  33   CREATININE, URINE (TOTAL) Latest Units: mg/dL  18.74   PROTEIN, URINE (TOTAL) Latest Ref Range: <12 mg/dL  40 (H)   PROTEIN/CREATININE RATIO Latest Ref Range: <=199 mgPR/gCR  2,134 (H)   SODIUM, URINE (TOTAL) Latest Units: mmol/L  23   POTASSIUM, URINE (TOTAL) Latest Units: mmol/L  13.9   Results for HENNA BURDICK (MRN [id number]) as of 2/1/2022 10:51   Ref. Range 1/31/2022 00:42 1/31/2022 20:49   HEP B Surface AG Latest Ref Range: Negative   Negative   HEPATITIS C ANTIBODY Latest Ref Range: Negative   Negative   INFLUENZA A BY PCR Latest Ref Range: Not Detected  Not Detected    INFLUENZA B BY PCR Latest Ref Range: Not Detected  Not Detected    RSV by PCR Latest Ref Range: Not Detected  Not Detected    SARS CoV 2 Qualitative RT PCR Latest Ref Range: Not Detected / Detected / Presumptive Positive / Inhibitors present  Not Detected    Results for HENNA BURDICK (MRN [id number]) as of 2/1/2022 10:51   Ref. Range 1/31/2022 20:49  n/a 1/31/2022 20:50   COMPLEMENT C3 Latest Ref Range: 79 - 152 mg/dL 100    COMPLEMENT C4 Latest Ref Range: 16.0 - 38.0 mg/dL 25.0    DOUBLE STRANDED DNA ANTIBODY Latest Ref Range: <=9 IUnits/mL  <1     Imaging  I have reviewed the pertinent imaging study reports. These are the pertinent findings: US KIDNEY BILATERAL  Narrative: EXAM: US KIDNEY BILATERAL    CLINICAL INDICATION: Acute kidney injury renal failure    COMPARISON:  None    TECHNIQUE: Multiple grayscale images of the kidneys and bladder were  obtained.    FINDINGS:  The right kidney measures up to 10.7 x 4.7 x 5.5 cm.    The left kidney measures 9.6 x 5.0 x 4.0 cm.    Bladder is suboptimally evaluated, patient voided prior to the exam.  Impression: 1.   Unremarkable sonographic appearance of the kidneys.      Electronically Signed by: JORDAN NAJERA D.O.   Signed on: 2/1/2022 7:49 PM      Results for HENNA BURDICK (MRN [id number]) as of 2/2/2022 10:46   Ref. Range 1/31/2022 02:48 1/31/2022 20:39   CHLORIDE, URINE (TOTAL) Latest Units: mmol/L  33   CREATININE, URINE (TOTAL) Latest Units: mg/dL  18.74   PROTEIN, URINE (TOTAL) Latest Ref Range: <12 mg/dL  40 (H)   PROTEIN/CREATININE RATIO Latest Ref Range: <=199 mgPR/gCR  2,134 (H)   SODIUM, URINE (TOTAL) Latest Units: mmol/L  23   POTASSIUM, URINE (TOTAL) Latest Units: mmol/L  13.9     ASSESSMENT/PLAN:       61 year old  with past medical history significant for hypertension, diabetes mellitus with diabetic nephropathy and CKD III a at baseline crt 1.52 presents with altered mental status with hypoglycemia      Acute kidney injury  on Chronic kidney disease IIIA due to diabetic nephropathy- acute deterioration likely volume mediated /acei effect  improved  - urine studies- fena < 1 % non nephrotic range proteinuria   -dose all medications for decreased GFR  -avoid nephrotoxic agents if at all possible   -monitor renal function   -Renal ultrasound reviewed    Chronic Kidney disease IIIa due to diabetic  nephropathy with 2.13g/d proteinuria       Hypoglycemia with NIDDM with CKD III a- per PMD avoid metformin    Hyperkalemia resolved due to ulysses    Hypertension - titrate medication avoid arb/acei/ diuretics in the short term     Obesity weight reduction recommented    Proteinuria likely diabetic nephropathy - serology evaluation in process    Acute pancreatitis with elevated lipase per PMD     Microscopic hematuria reevaluate persistence as outpatient     Leucocytosis per PMD     Plan     heplock IVF  -dose all medications for decreased GFR  -avoid nephrotoxic agents if at all possible   -monitor renal function   -Renal ultrasound ordered  Case discussed with nursing team and patient and son in situ  ADVISE FOLLOW UP IN RENAL CLINIC ON D.C         Total time spent on 45 MINS  including 50% of the time in discussion and counseling the patient about the findings, care plan, recommendations and all the questions asked by barrington answered to her apparent satisfaction.   Case discussed with the nursing team.      Thank you very much for this referral.  We will follow with you.  Please feel free to call with any questions.        Dahiana Mckeon MD   [facility] [place]  Contact via [employer]  Office/Answering service [phone]/ [phone]  Fax [phone]

## 2022-02-07 ENCOUNTER — APPOINTMENT (OUTPATIENT)
Dept: OBGYN | Facility: CLINIC | Age: 37
End: 2022-02-07

## 2022-02-07 ENCOUNTER — APPOINTMENT (OUTPATIENT)
Dept: ANTEPARTUM | Facility: CLINIC | Age: 37
End: 2022-02-07
Payer: COMMERCIAL

## 2022-02-07 DIAGNOSIS — Z23 ENCOUNTER FOR IMMUNIZATION: ICD-10-CM

## 2022-02-07 PROCEDURE — 76819 FETAL BIOPHYS PROFIL W/O NST: CPT

## 2022-02-07 PROCEDURE — 76816 OB US FOLLOW-UP PER FETUS: CPT

## 2022-02-14 ENCOUNTER — APPOINTMENT (OUTPATIENT)
Dept: OBGYN | Facility: CLINIC | Age: 37
End: 2022-02-14
Payer: COMMERCIAL

## 2022-02-14 ENCOUNTER — APPOINTMENT (OUTPATIENT)
Dept: ANTEPARTUM | Facility: CLINIC | Age: 37
End: 2022-02-14

## 2022-02-14 VITALS
BODY MASS INDEX: 29.82 KG/M2 | WEIGHT: 190 LBS | SYSTOLIC BLOOD PRESSURE: 103 MMHG | HEIGHT: 67 IN | DIASTOLIC BLOOD PRESSURE: 62 MMHG

## 2022-02-14 PROCEDURE — 0502F SUBSEQUENT PRENATAL CARE: CPT

## 2022-02-14 PROCEDURE — 0501F PRENATAL FLOW SHEET: CPT

## 2022-02-16 ENCOUNTER — APPOINTMENT (OUTPATIENT)
Dept: ANTEPARTUM | Facility: CLINIC | Age: 37
End: 2022-02-16

## 2022-02-16 ENCOUNTER — APPOINTMENT (OUTPATIENT)
Dept: OBGYN | Facility: CLINIC | Age: 37
End: 2022-02-16

## 2022-02-23 ENCOUNTER — APPOINTMENT (OUTPATIENT)
Dept: OBGYN | Facility: CLINIC | Age: 37
End: 2022-02-23
Payer: COMMERCIAL

## 2022-02-23 ENCOUNTER — APPOINTMENT (OUTPATIENT)
Dept: ANTEPARTUM | Facility: CLINIC | Age: 37
End: 2022-02-23
Payer: COMMERCIAL

## 2022-02-23 VITALS
SYSTOLIC BLOOD PRESSURE: 109 MMHG | BODY MASS INDEX: 30.13 KG/M2 | DIASTOLIC BLOOD PRESSURE: 65 MMHG | WEIGHT: 192 LBS | HEIGHT: 67 IN

## 2022-02-23 PROCEDURE — 0502F SUBSEQUENT PRENATAL CARE: CPT

## 2022-02-23 PROCEDURE — 76816 OB US FOLLOW-UP PER FETUS: CPT

## 2022-02-23 PROCEDURE — 76820 UMBILICAL ARTERY ECHO: CPT

## 2022-02-23 PROCEDURE — 76818 FETAL BIOPHYS PROFILE W/NST: CPT

## 2022-02-28 ENCOUNTER — APPOINTMENT (OUTPATIENT)
Dept: OBGYN | Facility: CLINIC | Age: 37
End: 2022-02-28
Payer: COMMERCIAL

## 2022-02-28 ENCOUNTER — APPOINTMENT (OUTPATIENT)
Dept: ANTEPARTUM | Facility: CLINIC | Age: 37
End: 2022-02-28
Payer: COMMERCIAL

## 2022-02-28 DIAGNOSIS — Z3A.36 36 WEEKS GESTATION OF PREGNANCY: ICD-10-CM

## 2022-02-28 DIAGNOSIS — Z33.1 PREGNANT STATE, INCIDENTAL: ICD-10-CM

## 2022-02-28 PROCEDURE — 76820 UMBILICAL ARTERY ECHO: CPT

## 2022-02-28 PROCEDURE — 76818 FETAL BIOPHYS PROFILE W/NST: CPT

## 2022-02-28 PROCEDURE — 0502F SUBSEQUENT PRENATAL CARE: CPT

## 2022-03-01 LAB — HIV1+2 AB SPEC QL IA.RAPID: NONREACTIVE

## 2022-03-02 ENCOUNTER — OUTPATIENT (OUTPATIENT)
Dept: INPATIENT UNIT | Facility: HOSPITAL | Age: 37
LOS: 1 days | Discharge: HOME | End: 2022-03-02
Payer: COMMERCIAL

## 2022-03-02 ENCOUNTER — RESULT REVIEW (OUTPATIENT)
Age: 37
End: 2022-03-02

## 2022-03-02 VITALS — DIASTOLIC BLOOD PRESSURE: 62 MMHG | HEART RATE: 82 BPM | SYSTOLIC BLOOD PRESSURE: 99 MMHG

## 2022-03-02 VITALS
DIASTOLIC BLOOD PRESSURE: 57 MMHG | HEART RATE: 99 BPM | RESPIRATION RATE: 16 BRPM | TEMPERATURE: 98 F | SYSTOLIC BLOOD PRESSURE: 100 MMHG | OXYGEN SATURATION: 99 %

## 2022-03-02 DIAGNOSIS — Z3A.00 WEEKS OF GESTATION OF PREGNANCY NOT SPECIFIED: ICD-10-CM

## 2022-03-02 DIAGNOSIS — Z98.890 OTHER SPECIFIED POSTPROCEDURAL STATES: Chronic | ICD-10-CM

## 2022-03-02 DIAGNOSIS — Z98.89 OTHER SPECIFIED POSTPROCEDURAL STATES: Chronic | ICD-10-CM

## 2022-03-02 DIAGNOSIS — O26.899 OTHER SPECIFIED PREGNANCY RELATED CONDITIONS, UNSPECIFIED TRIMESTER: ICD-10-CM

## 2022-03-02 DIAGNOSIS — Z90.5 ACQUIRED ABSENCE OF KIDNEY: Chronic | ICD-10-CM

## 2022-03-02 DIAGNOSIS — Z90.49 ACQUIRED ABSENCE OF OTHER SPECIFIED PARTS OF DIGESTIVE TRACT: Chronic | ICD-10-CM

## 2022-03-02 LAB
ALBUMIN SERPL ELPH-MCNC: 3.2 G/DL — LOW (ref 3.3–5)
ALP SERPL-CCNC: 100 U/L — SIGNIFICANT CHANGE UP (ref 40–120)
ALT FLD-CCNC: 22 U/L — SIGNIFICANT CHANGE UP (ref 4–33)
AMYLASE P1 CFR SERPL: 52 U/L — SIGNIFICANT CHANGE UP (ref 25–125)
ANION GAP SERPL CALC-SCNC: 11 MMOL/L — SIGNIFICANT CHANGE UP (ref 7–14)
AST SERPL-CCNC: 21 U/L — SIGNIFICANT CHANGE UP (ref 4–32)
BILIRUB SERPL-MCNC: <0.2 MG/DL — SIGNIFICANT CHANGE UP (ref 0.2–1.2)
BUN SERPL-MCNC: 10 MG/DL — SIGNIFICANT CHANGE UP (ref 7–23)
CALCIUM SERPL-MCNC: 8.9 MG/DL — SIGNIFICANT CHANGE UP (ref 8.4–10.5)
CHLORIDE SERPL-SCNC: 103 MMOL/L — SIGNIFICANT CHANGE UP (ref 98–107)
CO2 SERPL-SCNC: 22 MMOL/L — SIGNIFICANT CHANGE UP (ref 22–31)
CREAT SERPL-MCNC: 0.9 MG/DL — SIGNIFICANT CHANGE UP (ref 0.5–1.3)
EGFR: 86 ML/MIN/1.73M2 — SIGNIFICANT CHANGE UP
GLUCOSE SERPL-MCNC: 98 MG/DL — SIGNIFICANT CHANGE UP (ref 70–99)
HCT VFR BLD CALC: 35.3 % — SIGNIFICANT CHANGE UP (ref 34.5–45)
HGB BLD-MCNC: 11.8 G/DL — SIGNIFICANT CHANGE UP (ref 11.5–15.5)
LIDOCAIN IGE QN: 19 U/L — SIGNIFICANT CHANGE UP (ref 7–60)
MCHC RBC-ENTMCNC: 30.3 PG — SIGNIFICANT CHANGE UP (ref 27–34)
MCHC RBC-ENTMCNC: 33.4 GM/DL — SIGNIFICANT CHANGE UP (ref 32–36)
MCV RBC AUTO: 90.7 FL — SIGNIFICANT CHANGE UP (ref 80–100)
NRBC # BLD: 0 /100 WBCS — SIGNIFICANT CHANGE UP
NRBC # FLD: 0 K/UL — SIGNIFICANT CHANGE UP
PLATELET # BLD AUTO: 210 K/UL — SIGNIFICANT CHANGE UP (ref 150–400)
POTASSIUM SERPL-MCNC: 3.9 MMOL/L — SIGNIFICANT CHANGE UP (ref 3.5–5.3)
POTASSIUM SERPL-SCNC: 3.9 MMOL/L — SIGNIFICANT CHANGE UP (ref 3.5–5.3)
PROT SERPL-MCNC: 5.8 G/DL — LOW (ref 6–8.3)
RBC # BLD: 3.89 M/UL — SIGNIFICANT CHANGE UP (ref 3.8–5.2)
RBC # FLD: 14.1 % — SIGNIFICANT CHANGE UP (ref 10.3–14.5)
SODIUM SERPL-SCNC: 136 MMOL/L — SIGNIFICANT CHANGE UP (ref 135–145)
WBC # BLD: 9.86 K/UL — SIGNIFICANT CHANGE UP (ref 3.8–10.5)
WBC # FLD AUTO: 9.86 K/UL — SIGNIFICANT CHANGE UP (ref 3.8–10.5)

## 2022-03-02 PROCEDURE — 99214 OFFICE O/P EST MOD 30 MIN: CPT | Mod: 25

## 2022-03-02 PROCEDURE — 76700 US EXAM ABDOM COMPLETE: CPT | Mod: 26

## 2022-03-02 PROCEDURE — 76818 FETAL BIOPHYS PROFILE W/NST: CPT | Mod: 26

## 2022-03-02 RX ORDER — SODIUM CHLORIDE 9 MG/ML
1000 INJECTION, SOLUTION INTRAVENOUS ONCE
Refills: 0 | Status: COMPLETED | OUTPATIENT
Start: 2022-03-02 | End: 2022-03-02

## 2022-03-02 RX ORDER — SIMETHICONE 80 MG/1
80 TABLET, CHEWABLE ORAL ONCE
Refills: 0 | Status: DISCONTINUED | OUTPATIENT
Start: 2022-03-02 | End: 2022-03-17

## 2022-03-02 RX ADMIN — SODIUM CHLORIDE 1000 MILLILITER(S): 9 INJECTION, SOLUTION INTRAVENOUS at 16:20

## 2022-03-02 NOTE — OB PROVIDER TRIAGE NOTE - HISTORY OF PRESENT ILLNESS
Patient presented c/o right upper abdominal pain since this morning after breakfast, constant, rating 8/10. Patient reports leaking fluid for the last few days, denies vaginal bleeding, reports + fetal movement

## 2022-03-02 NOTE — OB RN TRIAGE NOTE - NS_OBGYNHISTORY_OBGYN_ALL_OB_FT
FT 2005 7#1  C/S FT 2007 7#   FT 2012 7#  Abnormal pap  Colposcopy  FT 2005 7#1  C/S FT 2007 7# placenta previa   FT 2012 7#  Abnormal pap  Colposcopy

## 2022-03-02 NOTE — OB PROVIDER TRIAGE NOTE - NSOBPROVIDERNOTE_OBGYN_ALL_OB_FT
37 yo , EGA@36.4 weeks, presented to D&T with c/o right upper abdominal pain since this morning after breakfast, constant, rating 8/10. Patient reports leaking fluid for the last few days, denies vaginal bleeding, reports + fetal movement. Denies fever, chills, headaches, changes in vision, chest pain, palpitations, shortness of breath, cough, nausea, vomiting, diarrhea, constipation, urinary symptoms, edema.    Prenatal care with Dr Jacob.   Prenatal course is significant for IUGR (7th %tile).       Patient denies signs and symptoms of COVID 19; denies symptomatic illness.    No adverse reactions to anesthesia, no objections to blood transfusions if clinically indicated.  OB hx: 2005, , [id number], uncomplicated            2007,  section, placenta previa, 7lbs, uncomplicated postpartum course            2012, , 7lbs, uncomplicated   Med hx: kidney donor              hx of ovarian Ca, s/p chemotherapy for 6 months  Surg hx:  section,                nephrectomy,                appendectomy,                abdominal plasty,                breast biopsy,  (benign)               breast reduction,     GYN hx: hx of abnormal Papsmear, s/p LEEP; hx of ovarian Ca;   Meds: Prenatal vitamins  Allergies: morphine (Anaphylaxis)  penicillin (Other)    Social hx: Denies alcohol, tobacco, drug use  Psych hx: denies hx of anxiety/depression;    PHYSICAL EXAM  Vital Signs   T(C): 36.8 (02 Mar 2022 14:32), Max: 36.9 (02 Mar 2022 13:51)  T(F): 98.24 (02 Mar 2022 14:32), Max: 98.4 (02 Mar 2022 13:51)  HR: 86 (02 Mar 2022 16:07) (86 - 99)  BP: 101/60 (02 Mar 2022 16:07) (100/57 - 107/69)  RR: 16 (02 Mar 2022 13:51) (16 - 16)  SpO2: 99% (02 Mar 2022 13:51) (99% - 99%)    Gen: NAD  Head: NC/AT  Cardio: S1S2+, RRR  Resp: CTABL, no wheezing  Abdomen: Soft, NT/ND, BS+  Extremities: No LE edema bilaterally    NST and BPP done to assess fetal surveillance and results as follows:   NST-->FHR: 140 HR baseline, moderate variability, accelerations present, no decelerations, Category 1.  [place]: Contractions none    BPP: 8/8, vertex, anterior placenta; EFW 2289 g (7th %tile); RICCO 7.6    SSE: negative pooling, negative Nitrazine, negative ferning;   SVE: closed/long    right sided abdominal tenderness upon palpation     A:  35yo , EGA@36.4 weeks, PTL rule dout; r/o cholelithiasis     Plan: CBC, CMP, lipase lipase IVF bolus; RUQ US ordered.     Management of further care was discussed with   Discharge patient home.  Labor precausions and fetal movements count were reviewed; if not in labor, will follow up with PMD within 3 days.  Prior notes, lab results (prenatal chart review, prenatal labs) and recommendations were reviewed;   All ordered tests results reviewed and interpreted.   Plan of care was reviewed with patient and family; patient states understading of the above plan.  In total 35 minutes spent with established/new patient.    Celeste Prakash CNM     22 @ 16:10 37 yo , EGA@36.4 weeks, presented to D&T with c/o right upper abdominal pain since this morning after breakfast, constant, rating 8/10. Patient reports leaking fluid for the last few days, denies vaginal bleeding, reports + fetal movement. Denies fever, chills, headaches, changes in vision, chest pain, palpitations, shortness of breath, cough, nausea, vomiting, diarrhea, constipation, urinary symptoms, edema.    Prenatal care with Dr Jacob.   Prenatal course is significant for IUGR (7th %tile).       Patient denies signs and symptoms of COVID 19; reports symptomatic illness in 2021.    No adverse reactions to anesthesia, no objections to blood transfusions if clinically indicated.  OB hx: 2005, , [id number], uncomplicated            2007,  section, placenta previa, 7lbs, uncomplicated postpartum course            2012, , 7lbs, uncomplicated   Med hx: kidney donor              hx of ovarian Ca, s/p chemotherapy for 6 months  Surg hx:  section,                nephrectomy,                appendectomy,                abdominal plasty,                breast biopsy,  (benign)               breast reduction,     GYN hx: hx of abnormal Papsmear, s/p LEEP; hx of ovarian Ca;   Meds: Prenatal vitamins  Allergies: morphine (Anaphylaxis)  penicillin (Other)    Social hx: Denies alcohol, tobacco, drug use  Psych hx: denies hx of anxiety/depression;    PHYSICAL EXAM  Vital Signs   T(C): 36.8 (02 Mar 2022 14:32), Max: 36.9 (02 Mar 2022 13:51)  T(F): 98.24 (02 Mar 2022 14:32), Max: 98.4 (02 Mar 2022 13:51)  HR: 86 (02 Mar 2022 16:07) (86 - 99)  BP: 101/60 (02 Mar 2022 16:07) (100/57 - 107/69)  RR: 16 (02 Mar 2022 13:51) (16 - 16)  SpO2: 99% (02 Mar 2022 13:51) (99% - 99%)    Gen: NAD  Head: NC/AT  Cardio: S1S2+, RRR  Resp: CTABL, no wheezing  Abdomen: Soft, NT/ND, BS+  Extremities: No LE edema bilaterally    NST and BPP done to assess fetal surveillance and results as follows:   NST-->FHR: 140 HR baseline, moderate variability, accelerations present, no decelerations, Category 1.  [place]: Contractions none    BPP: , vertex, anterior placenta; EFW 2289 g (7th %tile); RICCO 7.6    SSE: negative pooling, negative Nitrazine, negative ferning;   SVE: closed/long    right sided abdominal tenderness upon palpation     A:  37yo , EGA@36.4 weeks, PTL rule dout; r/o cholelithiasis     Plan: CBC, CMP, lipase lipase IVF bolus; RUQ US ordered.     Management of further care was discussed with Dr. Burgos; disposition is pending abdominal US result.  All ordered tests results reviewed and interpreted.   In total 35 minutes spent with established.    Celeste Prakash CNM     22 @ 16:10 37 yo , EGA@36.4 weeks, presented to D&T with c/o right upper abdominal pain since this morning after breakfast, constant, rating 8/10. Patient reports leaking fluid for the last few days, denies vaginal bleeding, reports + fetal movement. Denies fever, chills, headaches, changes in vision, chest pain, palpitations, shortness of breath, cough, nausea, vomiting, diarrhea, constipation, urinary symptoms, edema.    Prenatal care with Dr Jacob.   Prenatal course is significant for IUGR (7th %tile).       Patient denies signs and symptoms of COVID 19; reports symptomatic illness in 2021.    No adverse reactions to anesthesia, no objections to blood transfusions if clinically indicated.  OB hx: 2005, , [id number], uncomplicated            2007,  section, placenta previa, 7lbs, uncomplicated postpartum course            2012, , 7lbs, uncomplicated   Med hx: kidney donor              hx of ovarian Ca, s/p chemotherapy for 6 months  Surg hx:  section,                nephrectomy,                appendectomy,                abdominal plasty,                breast biopsy,  (benign)               breast reduction,     GYN hx: hx of abnormal Papsmear, s/p LEEP; hx of ovarian Ca;   Meds: Prenatal vitamins  Allergies: morphine (Anaphylaxis)  penicillin (Other)    Social hx: Denies alcohol, tobacco, drug use  Psych hx: denies hx of anxiety/depression;    PHYSICAL EXAM  Vital Signs   T(C): 36.8 (02 Mar 2022 14:32), Max: 36.9 (02 Mar 2022 13:51)  T(F): 98.24 (02 Mar 2022 14:32), Max: 98.4 (02 Mar 2022 13:51)  HR: 86 (02 Mar 2022 16:07) (86 - 99)  BP: 101/60 (02 Mar 2022 16:07) (100/57 - 107/69)  RR: 16 (02 Mar 2022 13:51) (16 - 16)  SpO2: 99% (02 Mar 2022 13:51) (99% - 99%)    Gen: NAD  Head: NC/AT  Cardio: S1S2+, RRR  Resp: CTABL, no wheezing  Abdomen: Soft, NT/ND, BS+  Extremities: No LE edema bilaterally    NST and BPP done to assess fetal surveillance and results as follows:   NST-->FHR: 140 HR baseline, moderate variability, accelerations present, no decelerations, Category 1.  [place]: Contractions none    BPP: , vertex, anterior placenta; EFW 2289 g (7th %tile); RICCO 7.6    SSE: negative pooling, negative Nitrazine, negative ferning;   SVE: closed/long    right sided abdominal tenderness upon palpation     A:  37yo , EGA@36.4 weeks, PTL rule dout; r/o cholelithiasis     Plan: CBC, CMP, lipase lipase IVF bolus; RUQ US ordered.     Management of further care was discussed with Dr. Burgos; disposition is pending abdominal US result.  All ordered tests results reviewed and interpreted.   In total 35 minutes spent with established.    Celeste Prakash CNM     22 @ 16:10    1800  Received report from JULIAN Prakash CNM.   [id number]  Abdominal sono reviewed: no evidence of cholecystitis     discussed result with patient and offered plan for pepcid and or simethicone. Pt refused further management. Plan for 37 yo , EGA@36.4 weeks, presented to D&T with c/o right upper abdominal pain since this morning after breakfast, constant, rating 8/10. Patient reports leaking fluid for the last few days, denies vaginal bleeding, reports + fetal movement. Denies fever, chills, headaches, changes in vision, chest pain, palpitations, shortness of breath, cough, nausea, vomiting, diarrhea, constipation, urinary symptoms, edema.    Prenatal care with Dr Jacob.   Prenatal course is significant for IUGR (7th %tile).       Patient denies signs and symptoms of COVID 19; reports symptomatic illness in 2021.    No adverse reactions to anesthesia, no objections to blood transfusions if clinically indicated.  OB hx: 2005, , [id number], uncomplicated            2007,  section, placenta previa, 7lbs, uncomplicated postpartum course            2012, , 7lbs, uncomplicated   Med hx: kidney donor              hx of ovarian Ca, s/p chemotherapy for 6 months  Surg hx:  section,                nephrectomy,                appendectomy,                abdominal plasty,                breast biopsy,  (benign)               breast reduction,     GYN hx: hx of abnormal Papsmear, s/p LEEP; hx of ovarian Ca;   Meds: Prenatal vitamins  Allergies: morphine (Anaphylaxis)  penicillin (Other)    Social hx: Denies alcohol, tobacco, drug use  Psych hx: denies hx of anxiety/depression;    PHYSICAL EXAM  Vital Signs   T(C): 36.8 (02 Mar 2022 14:32), Max: 36.9 (02 Mar 2022 13:51)  T(F): 98.24 (02 Mar 2022 14:32), Max: 98.4 (02 Mar 2022 13:51)  HR: 86 (02 Mar 2022 16:07) (86 - 99)  BP: 101/60 (02 Mar 2022 16:07) (100/57 - 107/69)  RR: 16 (02 Mar 2022 13:51) (16 - 16)  SpO2: 99% (02 Mar 2022 13:51) (99% - 99%)    Gen: NAD  Head: NC/AT  Cardio: S1S2+, RRR  Resp: CTABL, no wheezing  Abdomen: Soft, NT/ND, BS+  Extremities: No LE edema bilaterally    NST and BPP done to assess fetal surveillance and results as follows:   NST-->FHR: 140 HR baseline, moderate variability, accelerations present, no decelerations, Category 1.  [place]: Contractions none    BPP: , vertex, anterior placenta; EFW 2289 g (7th %tile); RICCO 7.6    SSE: negative pooling, negative Nitrazine, negative ferning;   SVE: closed/long    right sided abdominal tenderness upon palpation     A:  37yo , EGA@36.4 weeks, PTL rule dout; r/o cholelithiasis     Plan: CBC, CMP, lipase lipase IVF bolus; RUQ US ordered.     Management of further care was discussed with Dr. Burgos; disposition is pending abdominal US result.  All ordered tests results reviewed and interpreted.   In total 35 minutes spent with established.    Celeste Prakash CNM     22 @ 16:10    1800  Received report from JULIAN Prakash CNM.   [id number]  Abdominal sono reviewed: no evidence of cholecystitis     discussed result with patient and offered plan for pepcid and or simethicone. Pt refused further management stating " I just want to go home." Discussed with Dr Fong. Plan for discharge home with  labor precautions/fetal kick counts reviewed. Encouraged increased PO hydration. F/U next scheduled prenatal appointment.    APRIL Ambrosio

## 2022-03-02 NOTE — OB RN TRIAGE NOTE - CHIEF COMPLAINT QUOTE
abdominal pain abdominal pain.  Pt reports leaking fluid since Wednesday 2/23/22.  Pt was seen by MD on Monday 2/28/22 and was told to come to hospital to check to see if she broke her water.  Pt declined to come to hospital.

## 2022-03-03 ENCOUNTER — APPOINTMENT (OUTPATIENT)
Dept: ANTEPARTUM | Facility: CLINIC | Age: 37
End: 2022-03-03

## 2022-03-03 LAB — B-HEM STREP SPEC QL CULT: NORMAL

## 2022-03-05 NOTE — ED PROVIDER NOTE - DATE/TIME 2
Police brought patient in for potential drug over-dose. Patient non-verbal, hyperventilating. Eyes wide open.   04-Sep-2021 01:11

## 2022-03-07 ENCOUNTER — APPOINTMENT (OUTPATIENT)
Dept: OBGYN | Facility: CLINIC | Age: 37
End: 2022-03-07
Payer: COMMERCIAL

## 2022-03-07 ENCOUNTER — APPOINTMENT (OUTPATIENT)
Dept: ANTEPARTUM | Facility: CLINIC | Age: 37
End: 2022-03-07
Payer: COMMERCIAL

## 2022-03-07 PROCEDURE — 76818 FETAL BIOPHYS PROFILE W/NST: CPT

## 2022-03-07 PROCEDURE — 76820 UMBILICAL ARTERY ECHO: CPT

## 2022-03-07 PROCEDURE — 76816 OB US FOLLOW-UP PER FETUS: CPT

## 2022-03-07 PROCEDURE — 0502F SUBSEQUENT PRENATAL CARE: CPT

## 2022-03-10 ENCOUNTER — APPOINTMENT (OUTPATIENT)
Dept: OBGYN | Facility: CLINIC | Age: 37
End: 2022-03-10
Payer: COMMERCIAL

## 2022-03-10 ENCOUNTER — APPOINTMENT (OUTPATIENT)
Dept: ANTEPARTUM | Facility: CLINIC | Age: 37
End: 2022-03-10
Payer: COMMERCIAL

## 2022-03-10 PROCEDURE — 76820 UMBILICAL ARTERY ECHO: CPT

## 2022-03-10 PROCEDURE — 0502F SUBSEQUENT PRENATAL CARE: CPT

## 2022-03-10 PROCEDURE — 76818 FETAL BIOPHYS PROFILE W/NST: CPT

## 2022-03-10 PROCEDURE — 0501F PRENATAL FLOW SHEET: CPT

## 2022-03-13 ENCOUNTER — INPATIENT (INPATIENT)
Facility: HOSPITAL | Age: 37
LOS: 2 days | Discharge: HOME | End: 2022-03-16
Attending: OBSTETRICS & GYNECOLOGY | Admitting: OBSTETRICS & GYNECOLOGY
Payer: COMMERCIAL

## 2022-03-13 VITALS — DIASTOLIC BLOOD PRESSURE: 75 MMHG | SYSTOLIC BLOOD PRESSURE: 126 MMHG | TEMPERATURE: 98 F | HEART RATE: 101 BPM

## 2022-03-13 DIAGNOSIS — Z90.49 ACQUIRED ABSENCE OF OTHER SPECIFIED PARTS OF DIGESTIVE TRACT: Chronic | ICD-10-CM

## 2022-03-13 DIAGNOSIS — Z98.89 OTHER SPECIFIED POSTPROCEDURAL STATES: Chronic | ICD-10-CM

## 2022-03-13 DIAGNOSIS — Z98.890 OTHER SPECIFIED POSTPROCEDURAL STATES: Chronic | ICD-10-CM

## 2022-03-13 DIAGNOSIS — Z90.5 ACQUIRED ABSENCE OF KIDNEY: Chronic | ICD-10-CM

## 2022-03-13 LAB
BASOPHILS # BLD AUTO: 0.02 K/UL — SIGNIFICANT CHANGE UP (ref 0–0.2)
BASOPHILS NFR BLD AUTO: 0.2 % — SIGNIFICANT CHANGE UP (ref 0–2)
BLD GP AB SCN SERPL QL: NEGATIVE — SIGNIFICANT CHANGE UP
EOSINOPHIL # BLD AUTO: 0.04 K/UL — SIGNIFICANT CHANGE UP (ref 0–0.5)
EOSINOPHIL NFR BLD AUTO: 0.4 % — SIGNIFICANT CHANGE UP (ref 0–6)
HCT VFR BLD CALC: 36.4 % — SIGNIFICANT CHANGE UP (ref 34.5–45)
HGB BLD-MCNC: 12.3 G/DL — SIGNIFICANT CHANGE UP (ref 11.5–15.5)
IANC: 6.66 K/UL — SIGNIFICANT CHANGE UP (ref 1.5–8.5)
IMM GRANULOCYTES NFR BLD AUTO: 0.3 % — SIGNIFICANT CHANGE UP (ref 0–1.5)
LYMPHOCYTES # BLD AUTO: 1.91 K/UL — SIGNIFICANT CHANGE UP (ref 1–3.3)
LYMPHOCYTES # BLD AUTO: 20.9 % — SIGNIFICANT CHANGE UP (ref 13–44)
MCHC RBC-ENTMCNC: 30.1 PG — SIGNIFICANT CHANGE UP (ref 27–34)
MCHC RBC-ENTMCNC: 33.8 GM/DL — SIGNIFICANT CHANGE UP (ref 32–36)
MCV RBC AUTO: 89 FL — SIGNIFICANT CHANGE UP (ref 80–100)
MONOCYTES # BLD AUTO: 0.46 K/UL — SIGNIFICANT CHANGE UP (ref 0–0.9)
MONOCYTES NFR BLD AUTO: 5 % — SIGNIFICANT CHANGE UP (ref 2–14)
NEUTROPHILS # BLD AUTO: 6.66 K/UL — SIGNIFICANT CHANGE UP (ref 1.8–7.4)
NEUTROPHILS NFR BLD AUTO: 73.2 % — SIGNIFICANT CHANGE UP (ref 43–77)
NRBC # BLD: 0 /100 WBCS — SIGNIFICANT CHANGE UP
NRBC # FLD: 0 K/UL — SIGNIFICANT CHANGE UP
PLATELET # BLD AUTO: 218 K/UL — SIGNIFICANT CHANGE UP (ref 150–400)
RBC # BLD: 4.09 M/UL — SIGNIFICANT CHANGE UP (ref 3.8–5.2)
RBC # FLD: 14.2 % — SIGNIFICANT CHANGE UP (ref 10.3–14.5)
RH IG SCN BLD-IMP: POSITIVE — SIGNIFICANT CHANGE UP
RH IG SCN BLD-IMP: POSITIVE — SIGNIFICANT CHANGE UP
WBC # BLD: 9.12 K/UL — SIGNIFICANT CHANGE UP (ref 3.8–10.5)
WBC # FLD AUTO: 9.12 K/UL — SIGNIFICANT CHANGE UP (ref 3.8–10.5)

## 2022-03-13 RX ORDER — CITRIC ACID/SODIUM CITRATE 300-500 MG
15 SOLUTION, ORAL ORAL EVERY 6 HOURS
Refills: 0 | Status: DISCONTINUED | OUTPATIENT
Start: 2022-03-13 | End: 2022-03-14

## 2022-03-13 RX ORDER — SODIUM CHLORIDE 9 MG/ML
1000 INJECTION, SOLUTION INTRAVENOUS
Refills: 0 | Status: DISCONTINUED | OUTPATIENT
Start: 2022-03-13 | End: 2022-03-14

## 2022-03-13 RX ORDER — OXYTOCIN 10 UNIT/ML
333.33 VIAL (ML) INJECTION
Qty: 20 | Refills: 0 | Status: DISCONTINUED | OUTPATIENT
Start: 2022-03-13 | End: 2022-03-14

## 2022-03-13 NOTE — OB PROVIDER H&P - HISTORY OF PRESENT ILLNESS
HPI: Pt is a 36y  @ 38.1wga  presenting for IOL 2/ to IUGR (3/7 US w/ EFW in 9% w/ nl UAD)  FM (+)  LOF (-)  CTX (-)  VB (-)  GBS neg  EFW: 2757g    PNC complicated by:  - TOLAC    OBHx:  -2005. FT. . 7#1oz  -2007. c/s for previa. 7#  - 7#. Pregnancy otherwise uncomplicated   GynHx:   -Cervical dysplasia s/p LEEP (). Says she was told a physician had recommended a hysterectomy  -Tested (+) for Chlamydia 2022  -Ovarian cancer x10 years ago s/p chemo. Of note, this pregnancy was spontaneous  PMHx: Denies  PSHx:   - Breast reduction w/ normal biopsies (for breast masses)  - Left nephrectomy (): donated   Med: PNV  All: Morphine (anaphylaxis). PCN (hives)  Psych: Denies hx of mental health issues  SH: Denies hx of smoking, drinking, or drug usage during the pregnancy    Vital Signs Last 24 Hrs  T(C): 36.8 (13 Mar 2022 21:15), Max: 36.8 (13 Mar 2022 21:09)  T(F): 98.2 (13 Mar 2022 21:15), Max: 98.24 (13 Mar 2022 21:09)  HR: 101 (13 Mar 2022 21:15) (101 - 101)  BP: 126/75 (13 Mar 2022 21:15) (126/75 - 126/75)  BP(mean): --  RR: 16 (13 Mar 2022 21:15) (16 - 16)  SpO2: --    SVE: /-3  FHT: 135/mod(+)accels/(-)decels   [place]: Absent   Sono: Vertex           HPI: Pt is a 36y  @ 38.1wga  presenting for IOL 2/ to IUGR (3/7 US w/ EFW in 9% w/ nl UAD)  FM (+)  LOF (-)  CTX (-)  VB (-)  GBS neg  EFW: 2757g    PNC complicated by:  - TOLAC    OBHx:  -2005. FT. . 7#1oz  -2007. c/s for previa. 7#  - 7#. Pregnancy otherwise uncomplicated   GynHx:   -Cervical dysplasia s/p LEEP (). Says she was told a physician had recommended a hysterectomy  -Tested (+) for Chlamydia 2022. States she was treated for this   -Ovarian cancer x10 years ago s/p chemo. Of note, this pregnancy was spontaneous  PMHx: Denies  PSHx:   - Breast reduction w/ normal biopsies (for breast masses)  - Left nephrectomy (): donated   Med: PNV  All: Morphine (anaphylaxis). PCN (hives)  Psych: Denies hx of mental health issues  SH: Denies hx of smoking, drinking, or drug usage during the pregnancy    Vital Signs Last 24 Hrs  T(C): 36.8 (13 Mar 2022 21:15), Max: 36.8 (13 Mar 2022 21:09)  T(F): 98.2 (13 Mar 2022 21:15), Max: 98.24 (13 Mar 2022 21:09)  HR: 101 (13 Mar 2022 21:15) (101 - 101)  BP: 126/75 (13 Mar 2022 21:15) (126/75 - 126/75)  BP(mean): --  RR: 16 (13 Mar 2022 21:15) (16 - 16)  SpO2: --    SVE: /50/-3  FHT: 135/mod(+)accels/(-)decels   [place]: Absent   Sono: Vertex

## 2022-03-13 NOTE — OB RN PATIENT PROFILE - FALL HARM RISK - UNIVERSAL INTERVENTIONS
Bed in lowest position, wheels locked, appropriate side rails in place/Call bell, personal items and telephone in reach/Instruct patient to call for assistance before getting out of bed or chair/Non-slip footwear when patient is out of bed/[place] to call system/Physically safe environment - no spills, clutter or unnecessary equipment/Purposeful Proactive Rounding/Room/bathroom lighting operational, light cord in reach

## 2022-03-13 NOTE — OB PROVIDER H&P - ASSESSMENT
A/P: Pt is a 35yo  @ 38.1wga who presents for TOLAC in the setting of IUGR    1. Admit to LND. Routine Labs. IVF  2. IOL via CRB  3. Fetus: Cat 1 tracing, Vertex,  4. TOLAC  - Will have 2U of pRBCS on hold  - TOLAC form reviewed and signed. All questions answered to patient's apparent satisfaction   5. GBS neg   6. Pain: IV pain meds/epidural PRN      Dov Lira, PGY-1  Obstetrics and Gynecology    Discussed with Dr. Brown

## 2022-03-14 LAB
COVID-19 SPIKE DOMAIN AB INTERP: POSITIVE
COVID-19 SPIKE DOMAIN ANTIBODY RESULT: >250 U/ML — HIGH
SARS-COV-2 IGG+IGM SERPL QL IA: >250 U/ML — HIGH
SARS-COV-2 IGG+IGM SERPL QL IA: POSITIVE
T PALLIDUM AB TITR SER: NEGATIVE — SIGNIFICANT CHANGE UP

## 2022-03-14 RX ORDER — IBUPROFEN 200 MG
600 TABLET ORAL EVERY 6 HOURS
Refills: 0 | Status: COMPLETED | OUTPATIENT
Start: 2022-03-14 | End: 2023-02-10

## 2022-03-14 RX ORDER — OXYTOCIN 10 UNIT/ML
2 VIAL (ML) INJECTION
Qty: 30 | Refills: 0 | Status: DISCONTINUED | OUTPATIENT
Start: 2022-03-14 | End: 2022-03-15

## 2022-03-14 RX ORDER — DIPHENHYDRAMINE HCL 50 MG
25 CAPSULE ORAL EVERY 6 HOURS
Refills: 0 | Status: DISCONTINUED | OUTPATIENT
Start: 2022-03-14 | End: 2022-03-16

## 2022-03-14 RX ORDER — HYDROCORTISONE 1 %
1 OINTMENT (GRAM) TOPICAL EVERY 6 HOURS
Refills: 0 | Status: DISCONTINUED | OUTPATIENT
Start: 2022-03-14 | End: 2022-03-16

## 2022-03-14 RX ORDER — AER TRAVELER 0.5 G/1
1 SOLUTION RECTAL; TOPICAL EVERY 4 HOURS
Refills: 0 | Status: DISCONTINUED | OUTPATIENT
Start: 2022-03-14 | End: 2022-03-16

## 2022-03-14 RX ORDER — MAGNESIUM HYDROXIDE 400 MG/1
30 TABLET, CHEWABLE ORAL
Refills: 0 | Status: DISCONTINUED | OUTPATIENT
Start: 2022-03-14 | End: 2022-03-16

## 2022-03-14 RX ORDER — OXYTOCIN 10 UNIT/ML
333.33 VIAL (ML) INJECTION
Qty: 20 | Refills: 0 | Status: DISCONTINUED | OUTPATIENT
Start: 2022-03-14 | End: 2022-03-15

## 2022-03-14 RX ORDER — TETANUS TOXOID, REDUCED DIPHTHERIA TOXOID AND ACELLULAR PERTUSSIS VACCINE, ADSORBED 5; 2.5; 8; 8; 2.5 [IU]/.5ML; [IU]/.5ML; UG/.5ML; UG/.5ML; UG/.5ML
0.5 SUSPENSION INTRAMUSCULAR ONCE
Refills: 0 | Status: DISCONTINUED | OUTPATIENT
Start: 2022-03-14 | End: 2022-03-16

## 2022-03-14 RX ORDER — LANOLIN
1 OINTMENT (GRAM) TOPICAL EVERY 6 HOURS
Refills: 0 | Status: DISCONTINUED | OUTPATIENT
Start: 2022-03-14 | End: 2022-03-16

## 2022-03-14 RX ORDER — ACETAMINOPHEN 500 MG
975 TABLET ORAL
Refills: 0 | Status: DISCONTINUED | OUTPATIENT
Start: 2022-03-14 | End: 2022-03-16

## 2022-03-14 RX ORDER — PRAMOXINE HYDROCHLORIDE 150 MG/15G
1 AEROSOL, FOAM RECTAL EVERY 4 HOURS
Refills: 0 | Status: DISCONTINUED | OUTPATIENT
Start: 2022-03-14 | End: 2022-03-16

## 2022-03-14 RX ORDER — KETOROLAC TROMETHAMINE 30 MG/ML
30 SYRINGE (ML) INJECTION ONCE
Refills: 0 | Status: DISCONTINUED | OUTPATIENT
Start: 2022-03-14 | End: 2022-03-15

## 2022-03-14 RX ORDER — BENZOCAINE 10 %
1 GEL (GRAM) MUCOUS MEMBRANE EVERY 6 HOURS
Refills: 0 | Status: DISCONTINUED | OUTPATIENT
Start: 2022-03-14 | End: 2022-03-16

## 2022-03-14 RX ORDER — SODIUM CHLORIDE 9 MG/ML
3 INJECTION INTRAMUSCULAR; INTRAVENOUS; SUBCUTANEOUS EVERY 8 HOURS
Refills: 0 | Status: DISCONTINUED | OUTPATIENT
Start: 2022-03-14 | End: 2022-03-16

## 2022-03-14 RX ORDER — SIMETHICONE 80 MG/1
80 TABLET, CHEWABLE ORAL EVERY 4 HOURS
Refills: 0 | Status: DISCONTINUED | OUTPATIENT
Start: 2022-03-14 | End: 2022-03-16

## 2022-03-14 RX ORDER — DIBUCAINE 1 %
1 OINTMENT (GRAM) RECTAL EVERY 6 HOURS
Refills: 0 | Status: DISCONTINUED | OUTPATIENT
Start: 2022-03-14 | End: 2022-03-16

## 2022-03-14 RX ADMIN — SODIUM CHLORIDE 3 MILLILITER(S): 9 INJECTION INTRAMUSCULAR; INTRAVENOUS; SUBCUTANEOUS at 19:40

## 2022-03-14 RX ADMIN — Medication 1000 MILLIUNIT(S)/MIN: at 15:51

## 2022-03-14 NOTE — OB PROVIDER DELIVERY SUMMARY - NSPROVIDERDELIVERYNOTE_OBGYN_ALL_OB_FT
Spontaneous vaginal delivery of liveborn infant from [place]. Head, shoulders and body delivered easily. Infant was suctioned and handed off to mother/peds. Placenta delivered intact with a 3 vessel cord. Fundal massage was given and uterine fundus was found to be firm. Vaginal exam revealed an intact cervix, vaginal walls and sulci. No laceration was noted. Excellent hemostasis noted. Patient was stable. Count was correct x2. APGARS 9/9    Amyeo Afroz Jereen, PGY-1 Spontaneous vaginal delivery of liveborn infant from [facility]. Head, shoulders and body delivered easily. Infant was suctioned and handed off to mother/peds. Placenta delivered intact with a 3 vessel cord. Fundal massage was given and uterine fundus was found to be firm. Vaginal exam revealed an intact cervix, vaginal walls and sulci. No laceration was noted. Excellent hemostasis noted. Patient was stable. Count was correct x2. APGARS 9/9    Amyeo Afroz Jereen, PGY-1

## 2022-03-14 NOTE — OB PROVIDER LABOR PROGRESS NOTE - ASSESSMENT
Pt tolerated exam well.    - CB checked and in place  - c/w pitocin  - epidural called for (covered by Dr Akhtar given Dr Jacob in OR)    Amyeo Afroz Jereen, PGY-1  Checked by Jose Jacob made aware.
CB placed and instilled with 60cc/ 60cc of normal saline. Pt tolerated the procedure well and with out complication   Pt desires an epidural, but not at this time     Plan per Dr. Stephanie Allen, PGY-1

## 2022-03-14 NOTE — OB NEONATOLOGY/PEDIATRICIAN DELIVERY SUMMARY - BABY A: APGAR 5 MIN SCORE, DELIVERY
Patient ate courtesy meal with no n/v. He ambulated to and from bathroom with steady gait and reported minimal dizziness.   9

## 2022-03-14 NOTE — OB NEONATOLOGY/PEDIATRICIAN DELIVERY SUMMARY - NSPEDSNEONOTESA_OBGYN_ALL_OB_FT
Airway  Urgency: elective    Date/Time: 3/7/2022 9:33 AM  Airway not difficult    General Information and Staff    Patient location during procedure: OR  Anesthesiologist: Delgado Cope MD  CRNA: Tory Driver CRNA    Indications and Patient Condition  Indications for airway management: airway protection    Preoxygenated: yes  MILS not maintained throughout  Mask difficulty assessment: 1 - vent by mask    Final Airway Details  Final airway type: endotracheal airway      Successful airway: ETT  Cuffed: yes   Successful intubation technique: direct laryngoscopy  Endotracheal tube insertion site: oral  Blade: Fani  Blade size: 3  ETT size (mm): 7.0  Cormack-Lehane Classification: grade I - full view of glottis  Placement verified by: chest auscultation and capnometry   Measured from: lips  ETT/EBT  to lips (cm): 22  Number of attempts at approach: 1  Assessment: lips, teeth, and gum same as pre-op and atraumatic intubation    Additional Comments  PreO2 100%, FEO2 >85, SIVI, easy BMV, sniff position, ETT placed/ confirmed, attraumatic, teeth and lips as preop.             Called to delivery of a 38.2 wk infant to a 36 y.o. , O+/GBS negative (3/1), all other PNL unremarkable, COVID negative 3/11.  MedHx: Cervical dysplasia s/p LEEP (). Tested (+) for Chlamydia 2022. States she was treated for this.  Ovarian cancer x10 years ago s/p chemo. Of note, this pregnancy was spontaneous. Breast reduction w/ normal biopsies (for breast masses), Left nephrectomy ().  Obgyn:  ( and ), C/S (previa) .  AROM at 1045 (clear fluid).  Peds called due to category 2 tracing, delivered via , W/D/S/S, delayed cord clamping done. Apgars  EOS 0.08 Called to delivery of a 38.2 wk infant to a 36 y.o. , O+/GBS negative (3/1), all other PNL unremarkable, COVID negative 3/11.  MedHx: Cervical dysplasia s/p LEEP (). Tested (+) for Chlamydia 2022. States she was treated for this.  Ovarian cancer x10 years ago s/p chemo. Of note, this pregnancy was spontaneous. Breast reduction w/ normal biopsies (for breast masses), Left nephrectomy ().  Obgyn:  ( and ), C/S (previa) .  AROM at 1045 (clear fluid).  Peds called due to category 2 tracing, delivered via , W/D/S/S, delayed cord clamping done. Apgars 8/9, Sent to well baby nursery.  EOS 0.08

## 2022-03-14 NOTE — OB PROVIDER DELIVERY SUMMARY - NSSELHIDDEN_OBGYN_ALL_OB_FT
[NS_DeliveryAttending1_OBGYN_ALL_OB_FT:[id number]],[NS_DeliveryAssist1_OBGYN_ALL_OB_FT:[id number]]

## 2022-03-14 NOTE — OB RN DELIVERY SUMMARY - NS_SEPSISRSKCALC_OBGYN_ALL_OB_FT
EOS calculated successfully. EOS Risk Factor: 0.5/1000 live births ([facility] national incidence); GA=38w2d; Temp=98.24; ROM=3.95; GBS='Negative'; Antibiotics='No antibiotics or any antibiotics < 2 hrs prior to birth'

## 2022-03-14 NOTE — OB RN DELIVERY SUMMARY - NSSELHIDDEN_OBGYN_ALL_OB_FT
[NS_DeliveryAttending1_OBGYN_ALL_OB_FT:[id number]],[NS_DeliveryAssist1_OBGYN_ALL_OB_FT:[id number]],[NS_DeliveryRN_OBGYN_ALL_OB_FT:[id number]],[NS_CirculateRN2_OBGYN_ALL_OB_FT:[id number]]

## 2022-03-15 ENCOUNTER — TRANSCRIPTION ENCOUNTER (OUTPATIENT)
Age: 37
End: 2022-03-15

## 2022-03-15 RX ORDER — IBUPROFEN 200 MG
600 TABLET ORAL EVERY 6 HOURS
Refills: 0 | Status: DISCONTINUED | OUTPATIENT
Start: 2022-03-15 | End: 2022-03-16

## 2022-03-15 RX ORDER — IBUPROFEN 200 MG
1 TABLET ORAL
Qty: 0 | Refills: 0 | DISCHARGE
Start: 2022-03-15

## 2022-03-15 RX ADMIN — SODIUM CHLORIDE 3 MILLILITER(S): 9 INJECTION INTRAMUSCULAR; INTRAVENOUS; SUBCUTANEOUS at 06:47

## 2022-03-15 RX ADMIN — Medication 975 MILLIGRAM(S): at 11:20

## 2022-03-15 RX ADMIN — SODIUM CHLORIDE 3 MILLILITER(S): 9 INJECTION INTRAMUSCULAR; INTRAVENOUS; SUBCUTANEOUS at 13:20

## 2022-03-15 RX ADMIN — Medication 1 TABLET(S): at 12:28

## 2022-03-15 RX ADMIN — Medication 975 MILLIGRAM(S): at 10:14

## 2022-03-15 NOTE — DISCHARGE NOTE OB - CARE PROVIDER_API CALL
Melvin Jacob)  MaternalFetal Medicine; Obstetrics and Gynecology  [address] [place] [id number]  Phone: [phone]  Fax: [phone]  Follow Up Time:

## 2022-03-15 NOTE — DISCHARGE NOTE OB - PATIENT PORTAL LINK FT
You can access the FollowMyHealth Patient Portal offered by [facility] by registering at the following website: [email or web address]. By joining [facility]’s FollowMyHealth portal, you will also be able to view your health information using other applications (apps) compatible with our system.

## 2022-03-15 NOTE — DISCHARGE NOTE OB - MEDICATION SUMMARY - MEDICATIONS TO TAKE
I will START or STAY ON the medications listed below when I get home from the hospital:    ibuprofen 600 mg oral tablet  -- 1 tab(s) by mouth every 6 hours  -- Indication: For [place] small for gestational age

## 2022-03-15 NOTE — DISCHARGE NOTE OB - CARE PLAN
Principal Discharge DX:	, delivered  Assessment and plan of treatment:	follow up with Dr. Jacob in 6 weeks   1

## 2022-03-15 NOTE — PROGRESS NOTE ADULT - SUBJECTIVE AND OBJECTIVE BOX
Labor progress Note:  Patient seen and examined  Has no complains  Comfortable with epidural.  Contractions about 3-4 minutes apart and FHR tracing is cat 1  Balloon removed and cx was 70 percent effaced 304 cms dilated, Vx -3 station, AROM done clear fluid obtained.  Will continue to monitor labor progress
S: 35yo   PPD#1  s/p . Obhx: c/s, GynHx: Ovarian CA X10 yrs. ago s/p Chemo Tx.  Patient feels well. Pain is well controlled. She is tolerating a regular diet and passing flatus. BM X2  She is voiding spontaneously, and ambulating without difficulty. Denies CP/SOB. Denies lightheadedness/dizziness. Denies N/V.    O:  Vitals:  Vital Signs Last 24 Hrs  T(C): 36.6 (15 Mar 2022 06:31), Max: 36.8 (14 Mar 2022 10:57)  T(F): 97.9 (15 Mar 2022 06:31), Max: 98.24 (14 Mar 2022 10:57)  HR: 88 (15 Mar 2022 06:31) (82 - 165)  BP: 100/69 (15 Mar 2022 06:31) (98/60 - 151/66)  BP(mean): --  RR: 18 (15 Mar 2022 06:31) (18 - 18)  SpO2: 99% (15 Mar 2022 06:31) (92% - 99%)    MEDICATIONS  (STANDING):  acetaminophen     Tablet .. 975 milliGRAM(s) Oral <User Schedule>  diphtheria/tetanus/pertussis (acellular) Vaccine (ADAcel) 0.5 milliLiter(s) IntraMuscular once  ibuprofen  Tablet. 600 milliGRAM(s) Oral every 6 hours  prenatal multivitamin 1 Tablet(s) Oral daily  sodium chloride 0.9% lock flush 3 milliLiter(s) IV Push every 8 hours      Labs:  Blood type: O Positive  Rubella IgG: RPR: Negative                          12.3   9.12 >-----------< 218    (  @ 22:20 )             36.4          Physical Exam:  General: NAD, Alert, Ox4  Abdomen: soft, non-tender, non-distended, fundus firm. Previous c/s incision intact  Vaginal: Lochia wnl, no clots  Extremities: No erythema/edema, no Calf tenderness    A/P: 35yo PPD#1 s/p / .  Patient is stable and doing well post-partum.   - Pain well controlled, continue current pain regimen  - Increase ambulation, SCDs when not ambulating  - Continue regular diet/ Hydration  -f/u in 6 weeks with Ob for PP check  -PP care reviewed ( bleeding, fever, infection, pain)    Katei Vasquez, NP

## 2022-03-16 VITALS
HEART RATE: 83 BPM | OXYGEN SATURATION: 98 % | SYSTOLIC BLOOD PRESSURE: 122 MMHG | DIASTOLIC BLOOD PRESSURE: 77 MMHG | RESPIRATION RATE: 18 BRPM | TEMPERATURE: 98 F

## 2022-03-24 NOTE — ED PROVIDER NOTE - ATTENDING CONTRIBUTION TO CARE
[No Acute Distress] : no acute distress [Well Nourished] : well nourished [Well Developed] : well developed [Well-Appearing] : well-appearing [Normal Sclera/Conjunctiva] : normal sclera/conjunctiva [PERRL] : pupils equal round and reactive to light [EOMI] : extraocular movements intact [Normal Outer Ear/Nose] : the outer ears and nose were normal in appearance [Normal Oropharynx] : the oropharynx was normal [No JVD] : no jugular venous distention [No Lymphadenopathy] : no lymphadenopathy [Supple] : supple [Thyroid Normal, No Nodules] : the thyroid was normal and there were no nodules present [No Respiratory Distress] : no respiratory distress  [No Accessory Muscle Use] : no accessory muscle use [Clear to Auscultation] : lungs were clear to auscultation bilaterally [Normal Rate] : normal rate  [Regular Rhythm] : with a regular rhythm [Normal S1, S2] : normal S1 and S2 Attending Statement: I have personally seen and examined this patient. I have fully participated in the care of this patient. I have reviewed all pertinent clinical information, including history physical exam, plan and the Resident's note and agree except as noted  34yo F PMH of kidney donation (10yrs ago) and s/p LEEP (11/23) presents with palpitations, back pain and abdominal pain today. States had a temp 101 at 3pm no chest pain or sob. no cough. Endorse upper back pain. Abdominal pain located at the left lower abdomen, no n/v/d no dysuria Took tylenol at 3pm w min relief.   Vital signs noted. laying down no distress. mmm. non icteric. HR 88 regular. no resp distress pulse ox 100 RA soft tender lower abdomen, left greater than right. no cvat. no pedal edema. no calf tenderness. normal pulses bilateral feet.  plan ekg, labs, cxr, IVf, pain med, ct abdomen-pelvis, re assess [No Murmur] : no murmur heard [No Carotid Bruits] : no carotid bruits [No Abdominal Bruit] : a ~M bruit was not heard ~T in the abdomen [No Varicosities] : no varicosities [Pedal Pulses Present] : the pedal pulses are present [No Edema] : there was no peripheral edema [No Palpable Aorta] : no palpable aorta [No Extremity Clubbing/Cyanosis] : no extremity clubbing/cyanosis [Soft] : abdomen soft [Non Tender] : non-tender [Non-distended] : non-distended [No Masses] : no abdominal mass palpated [No HSM] : no HSM [Normal Bowel Sounds] : normal bowel sounds [Normal Posterior Cervical Nodes] : no posterior cervical lymphadenopathy [Normal Anterior Cervical Nodes] : no anterior cervical lymphadenopathy [No CVA Tenderness] : no CVA  tenderness [No Spinal Tenderness] : no spinal tenderness [No Joint Swelling] : no joint swelling [Grossly Normal Strength/Tone] : grossly normal strength/tone [No Rash] : no rash [Coordination Grossly Intact] : coordination grossly intact [No Focal Deficits] : no focal deficits [Normal Gait] : normal gait [Deep Tendon Reflexes (DTR)] : deep tendon reflexes were 2+ and symmetric [Normal Affect] : the affect was normal [Normal Insight/Judgement] : insight and judgment were intact

## 2022-04-27 ENCOUNTER — APPOINTMENT (OUTPATIENT)
Dept: OBGYN | Facility: CLINIC | Age: 37
End: 2022-04-27

## 2022-04-27 VITALS
BODY MASS INDEX: 26.06 KG/M2 | SYSTOLIC BLOOD PRESSURE: 89 MMHG | HEIGHT: 67 IN | WEIGHT: 166 LBS | DIASTOLIC BLOOD PRESSURE: 61 MMHG

## 2022-04-27 DIAGNOSIS — Z30.430 ENCOUNTER FOR INSERTION OF INTRAUTERINE CONTRACEPTIVE DEVICE: ICD-10-CM

## 2022-04-27 DIAGNOSIS — Z11.3 ENCOUNTER FOR SCREENING FOR INFECTIONS WITH A PREDOMINANTLY SEXUAL MODE OF TRANSMISSION: ICD-10-CM

## 2022-04-27 RX ORDER — AZITHROMYCIN 500 MG/1
500 TABLET, FILM COATED ORAL
Qty: 4 | Refills: 0 | Status: COMPLETED | COMMUNITY
Start: 2021-08-24 | End: 2022-04-27

## 2022-04-27 RX ORDER — PRENATAL SUPPLEMENT WITH DHA 1100; 30; 1.6; 1.8; 15; 2.5; .012; 1; .15; 20; 25; 2; 1000; 200; 20; 29 [IU]/1; MG/1; MG/1; MG/1; MG/1; MG/1; MG/1; MG/1; MG/1; MG/1; MG/1; MG/1; [IU]/1; MG/1; [IU]/1; MG/1
29-1-200 CAPSULE, GELATIN COATED ORAL
Qty: 30 | Refills: 6 | Status: COMPLETED | COMMUNITY
Start: 2021-09-30 | End: 2022-04-27

## 2022-04-27 RX ORDER — PRENATAL 105/IRON/FOLIC AC/DHA 30-1.4-3
30-1.4 & 3 COMBINATION PACKAGE (EA) ORAL
Qty: 30 | Refills: 8 | Status: COMPLETED | COMMUNITY
Start: 2021-10-11 | End: 2022-04-27

## 2022-04-27 RX ORDER — VITAMIN A, ASCORBIC ACID, VITAMIN D, .ALPHA.-TOCOPHEROL, THIAMINE MONONITRATE, RIBOFLAVIN, NIACIN, PYRIDOXINE HYDROCHLORIDE, FOLIC ACID, CYANOCOBALAMIN, CALCIUM, IRON, MAGNESIUM, ZINC, AND COPPER 2700; 70; 400; 30; 1.6; 1.8; 18; 2.5; 1; 12; 100; 65; 25; 25; 2 [IU]/1; MG/1; [IU]/1; [IU]/1; MG/1; MG/1; MG/1; MG/1; MG/1; UG/1; MG/1; MG/1; MG/1; MG/1; MG/1
TABLET ORAL DAILY
Refills: 0 | Status: DISCONTINUED | COMMUNITY
End: 2022-04-27

## 2022-04-27 RX ORDER — PRENATAL 71/IRON/FOLIC AC/DHA 30-1.4-2
30-1.4-2 CAPSULE,IMMEDIATE, DELAY RELEASE,BIPHASE ORAL
Qty: 30 | Refills: 6 | Status: COMPLETED | COMMUNITY
Start: 2021-11-20 | End: 2022-04-27

## 2022-04-27 RX ORDER — PRENATAL 71/IRON/FOLIC AC/DHA 30-1.4-2
30-1.4-2 CAPSULE,IMMEDIATE, DELAY RELEASE,BIPHASE ORAL DAILY
Qty: 30 | Refills: 0 | Status: COMPLETED | COMMUNITY
Start: 2021-10-12 | End: 2022-04-27

## 2022-04-27 NOTE — ASSESSMENT
[FreeTextEntry1] : 36 year old F presenting for IUD insertion.\par -IUD inserted without complication (please see procedure note for full details)\par -Patient given a User Card with instructions to follow up as needed and to have the device removed in 5 years\par -f/u 4 weeks for IUD sono check\par \par -post partum exam\par -STD screening

## 2022-04-27 NOTE — PROCEDURE
[IUD Placement] : intrauterine device (IUD) placement [Time out performed] : Pre-procedure time out performed.  Patient's name, date of birth and procedure confirmed. [Consent Obtained] : Consent obtained [Prevention of Pregnancy] : prevention of pregnancy [Risks] : risks [Benefits] : benefits [Alternatives] : alternatives [Patient] : patient [Infection] : infection [Bleeding] : bleeding [Pain] : pain [Expulsion] : expulsion [Failure] : failure [Uterine Perforation] : uterine perforation [Neg Pregnancy Test] : negative pregnancy test [No Premedication] : No premedication [Betadine] : Betadine [Tenaculum] : Tenaculum [Easy Passage] : Easy passage [Mirena IUD] : Mirena IUD [Tolerated Well] : Patient tolerated the procedure well [No Complications] : No complications [LMPDate] : 06/25/22 [de-identified] : [id number] [de-identified] : 10/2023

## 2022-04-28 LAB
BASOPHILS # BLD AUTO: 0.02 K/UL
BASOPHILS NFR BLD AUTO: 0.3 %
C TRACH RRNA SPEC QL NAA+PROBE: NOT DETECTED
EOSINOPHIL # BLD AUTO: 0.09 K/UL
EOSINOPHIL NFR BLD AUTO: 1.5 %
HAV IGM SER QL: NONREACTIVE
HBV CORE IGM SER QL: NONREACTIVE
HBV SURFACE AG SER QL: NONREACTIVE
HBV SURFACE AG SER QL: NONREACTIVE
HCT VFR BLD CALC: 47.7 %
HCV AB SER QL: NONREACTIVE
HCV S/CO RATIO: 0.13 S/CO
HGB BLD-MCNC: 15 G/DL
HIV1+2 AB SPEC QL IA.RAPID: NONREACTIVE
IMM GRANULOCYTES NFR BLD AUTO: 0 %
LYMPHOCYTES # BLD AUTO: 1.94 K/UL
LYMPHOCYTES NFR BLD AUTO: 33 %
MAN DIFF?: NORMAL
MCHC RBC-ENTMCNC: 29.2 PG
MCHC RBC-ENTMCNC: 31.4 GM/DL
MCV RBC AUTO: 93 FL
MONOCYTES # BLD AUTO: 0.27 K/UL
MONOCYTES NFR BLD AUTO: 4.6 %
N GONORRHOEA RRNA SPEC QL NAA+PROBE: NOT DETECTED
NEUTROPHILS # BLD AUTO: 3.55 K/UL
NEUTROPHILS NFR BLD AUTO: 60.6 %
PLATELET # BLD AUTO: 223 K/UL
RBC # BLD: 5.13 M/UL
RBC # FLD: 13.2 %
SOURCE AMPLIFICATION: NORMAL
WBC # FLD AUTO: 5.87 K/UL

## 2022-05-02 LAB — T PALLIDUM AB SER QL IA: NEGATIVE

## 2022-05-11 NOTE — ED PROVIDER NOTE - NS ED ATTENDING STATEMENT MOD
FAMILY HISTORY:  Sibling  Still living? Unknown  Family history of diabetes mellitus (DM), Age at diagnosis: Age Unknown  Family history of lymphoma, Age at diagnosis: Age Unknown     Attending with

## 2022-05-25 ENCOUNTER — APPOINTMENT (OUTPATIENT)
Dept: ANTEPARTUM | Facility: CLINIC | Age: 37
End: 2022-05-25

## 2022-05-25 ENCOUNTER — APPOINTMENT (OUTPATIENT)
Dept: OBGYN | Facility: CLINIC | Age: 37
End: 2022-05-25

## 2022-06-10 NOTE — ED ADULT TRIAGE NOTE - WEIGHT IN LBS
Pre-Surgical Optimization Evaluation    Planned Procedure: Scheduled for Bronchoscopy with Dr Kirkland at [facility] on June 21, 2022    Planned Anesthesia:  General    Pre-Op Consultations:  PCP: Kathy Mcqueen NP     Pertinent Medical History:  1. Hypertension - lisinopril  2. COPD - Emphysema  1. Smoker  2. Albuterol, wixela  3. Anxiety - alprazolam  4.   Gerd - pantoprazole  5.    H/O  Colon Ca 2015    1. Required resection      RCRI SCORE: 0 Points, Class I, 3.9% Cardiac Risk.       Estimated body mass index is 21.9 kg/m² as calculated from the following:    Height as of 6/9/22: 5' 4\" (1.626 m).    Weight as of 6/9/22: 57.9 kg (127 lb 9.6 oz).    Last intubation  Placement date 09/23/15  Removal date 09/23/15    Placement time 1309  Removal time 1554    Site 7 mm  Days less than 1   Type: Cuffed  Intubation Device: Rowe    Positioning Instrument Size: 2  Glottic View:: 1    Attempts: 1  Intubation Procedure: ETC02;Breath Sounds;Preoxygenation;Atraumatic        Pre-Surgical Optimization evaluation complete. Patient is low risk for intermediate risk procedure. Pertinent studies as follows.     Stress Test 6/16/22  Resting ECG reveals a normal sinus rhythm, rate of 69 beats per minute. This is a normal ECG.       The patient underwent a Lexiscan Cardiolite stress test.  Baseline heart rate was 69 with a blood pressure 156/70.  With the Lexiscan infusion the patient had no complaints of chest pain or shortness of breath.  Blood pressure response was appropriate with the infusion.  There were no arrhythmias.  There were no EKG changes.  This stress test is within normal limits and is negative for ischemia.       CONCLUSION:   This stress test is within normal limits and is negative for ischemia.     ECG 6/2/22  Normal sinus rhythm   Biatrial enlargement   Right axis deviation   Pulmonary disease pattern   Right ventricular hypertrophy   with repolarization abnormality   Abnormal ECG   When compared with ECG of    03-AUG-2021 08:57,   No significant change was found      CT Lung Cancer Screen 5/31/22  Pulmonary Nodules:   ---------------------------------  Nodule 1  Mean Axial Diameter: 14.5 mm. (12 x 17 x 16mm)  Composition: Solid non-calcified.  Margin: Slightly irregular  Location: Anterior right upper lobe, branching contacting the anterior  medial lateral.  Series/Image: 4/107 through 140.  Comparison: New.     Nodule 2  Mean Axial Diameter: 4.9 mm.  Composition: Solid non-calcified.  Margin: Smooth.  Location: Right upper lobe, daughter nodule.  Series/Image: 4/141.  Comparison: New.     Nodule 3  Mean Axial Diameter: 6.6 mm.  Composition: Solid non-calcified.  Margin: Smooth.  Location: Left lower lobe.  Series/Image: 4/302.  Comparison: Stable     There are one or more additional, small pulmonary nodules present, all less  than 4 mm in mean axial dimension, none suspicious.  ---------------------------------     Lungs/Pleura: Severe emphysema. Minimal groundglass nodularity in the right  lower lobe on image 309 is relatively stable. Multiple scattered calcified  granulomata are seen.  Vessels: Aorta and main pulmonary artery are not significantly dilated.  Coronaries: Moderate coronary calcification.     Other Findings: Lipid rich adenoma is suggested of the left adrenal gland.    Most Recent Laboratory Results:  Lab Results   Component Value Date    SODIUM 141 06/09/2022    POTASSIUM 3.9 06/09/2022    BUN 16 06/09/2022    CREATININE 0.87 06/09/2022    GLUCOSE 88 06/09/2022    ALBUMIN 3.3 (L) 06/09/2022    ALKPT 56 06/09/2022    AST 16 06/09/2022    GPT 13 06/09/2022    WBC 9.0 06/09/2022    HGB 14.3 06/09/2022    HCT 44.8 06/09/2022     06/09/2022    MRSAPC NOT DETECTED 09/23/2015       Past Medical History:      Diverticulitis                                                Unspecified essential hypertension              8/16/2012     Anxiety state, unspecified                      8/16/2012     Pneumonia                                                      Unspecified sinusitis (chronic)                               Arthritis                                                     Inflammatory bowel disease                                    Tubular adenoma of colon                        9/2012        COPD (chronic obstructive pulmonary disease) (*               Myalgia due to statin                                          Past Surgical History:     CHOLECYSTECTOMY                                               REMOVAL GALLBLADDER                                           APPENDECTOMY                                                  BACK SURGERY                                                  ANESTH,ARM/ELBOW BONES,OPEN SURGERY                           CATARACT EXTRACTION W/  INTRAOCULAR LENS IMPLA* bilateral     EYE SURGERY                                                   COLECTOMY                                       9/2015          Comment: lap sigmoid colon resection    EGD                                             06/09/2021      Comment: mild gastritis and mild esophagitis, Dr Desir    6/10/2022  Tabitha Abernathy PA-C   154.1

## 2022-06-23 ENCOUNTER — APPOINTMENT (OUTPATIENT)
Dept: PLASTIC SURGERY | Facility: CLINIC | Age: 37
End: 2022-06-23
Payer: COMMERCIAL

## 2022-06-23 VITALS
BODY MASS INDEX: 26.06 KG/M2 | WEIGHT: 166 LBS | HEART RATE: 77 BPM | DIASTOLIC BLOOD PRESSURE: 68 MMHG | SYSTOLIC BLOOD PRESSURE: 102 MMHG | HEIGHT: 67 IN | OXYGEN SATURATION: 99 % | TEMPERATURE: 97.6 F

## 2022-06-23 PROCEDURE — 99213 OFFICE O/P EST LOW 20 MIN: CPT

## 2022-08-11 ENCOUNTER — OUTPATIENT (OUTPATIENT)
Dept: OUTPATIENT SERVICES | Facility: HOSPITAL | Age: 37
LOS: 1 days | End: 2022-08-11
Payer: COMMERCIAL

## 2022-08-11 ENCOUNTER — APPOINTMENT (OUTPATIENT)
Dept: PLASTIC SURGERY | Facility: CLINIC | Age: 37
End: 2022-08-11

## 2022-08-11 VITALS
OXYGEN SATURATION: 98 % | TEMPERATURE: 97.2 F | HEART RATE: 60 BPM | WEIGHT: 166 LBS | BODY MASS INDEX: 26.06 KG/M2 | DIASTOLIC BLOOD PRESSURE: 66 MMHG | SYSTOLIC BLOOD PRESSURE: 99 MMHG | HEIGHT: 67 IN

## 2022-08-11 VITALS
SYSTOLIC BLOOD PRESSURE: 99 MMHG | HEART RATE: 67 BPM | HEIGHT: 67 IN | OXYGEN SATURATION: 100 % | WEIGHT: 164.91 LBS | DIASTOLIC BLOOD PRESSURE: 64 MMHG | TEMPERATURE: 98 F | RESPIRATION RATE: 16 BRPM

## 2022-08-11 DIAGNOSIS — N64.89 OTHER SPECIFIED DISORDERS OF BREAST: ICD-10-CM

## 2022-08-11 DIAGNOSIS — Z98.890 OTHER SPECIFIED POSTPROCEDURAL STATES: Chronic | ICD-10-CM

## 2022-08-11 DIAGNOSIS — Z98.89 OTHER SPECIFIED POSTPROCEDURAL STATES: Chronic | ICD-10-CM

## 2022-08-11 DIAGNOSIS — Z90.49 ACQUIRED ABSENCE OF OTHER SPECIFIED PARTS OF DIGESTIVE TRACT: Chronic | ICD-10-CM

## 2022-08-11 DIAGNOSIS — Z01.818 ENCOUNTER FOR OTHER PREPROCEDURAL EXAMINATION: ICD-10-CM

## 2022-08-11 DIAGNOSIS — Z98.890 OTHER SPECIFIED POSTPROCEDURAL STATES: ICD-10-CM

## 2022-08-11 DIAGNOSIS — Z90.5 ACQUIRED ABSENCE OF KIDNEY: Chronic | ICD-10-CM

## 2022-08-11 LAB
HCT VFR BLD CALC: 41.3 % — SIGNIFICANT CHANGE UP (ref 34.5–45)
HGB BLD-MCNC: 13.8 G/DL — SIGNIFICANT CHANGE UP (ref 11.5–15.5)
HSV 1+2 IGG SER IA-IMP: POSITIVE
HSV 1+2 IGG SER IA-IMP: POSITIVE
HSV1 IGG SER QL: 31.6 INDEX
HSV2 IGG SER QL: 9.71 INDEX
MCHC RBC-ENTMCNC: 30 PG — SIGNIFICANT CHANGE UP (ref 27–34)
MCHC RBC-ENTMCNC: 33.4 GM/DL — SIGNIFICANT CHANGE UP (ref 32–36)
MCV RBC AUTO: 89.8 FL — SIGNIFICANT CHANGE UP (ref 80–100)
NRBC # BLD: 0 /100 WBCS — SIGNIFICANT CHANGE UP (ref 0–0)
PLATELET # BLD AUTO: 244 K/UL — SIGNIFICANT CHANGE UP (ref 150–400)
RBC # BLD: 4.6 M/UL — SIGNIFICANT CHANGE UP (ref 3.8–5.2)
RBC # FLD: 12.1 % — SIGNIFICANT CHANGE UP (ref 10.3–14.5)
WBC # BLD: 5.53 K/UL — SIGNIFICANT CHANGE UP (ref 3.8–10.5)
WBC # FLD AUTO: 5.53 K/UL — SIGNIFICANT CHANGE UP (ref 3.8–10.5)

## 2022-08-11 PROCEDURE — 99212 OFFICE O/P EST SF 10 MIN: CPT

## 2022-08-11 NOTE — H&P PST ADULT - ENDOCRINE
Bed: 18  Expected date: 6/17/17  Expected time: 9:33 PM  Means of arrival:   Comments:  24 y/o male laceration on palm of left hand, 2\" long and very deep after fall onto glass    Bleeding is controlled    141/89  97% r/a  110 pulse   No pain  
pvt home with family
negative

## 2022-08-11 NOTE — H&P PST ADULT - HISTORY OF PRESENT ILLNESS
37 year old female, kidney donor ( 2012), with history of ovarian cancer > 10 yr ago, chemo, ovarian cystectomy 2016, patient was recently diagnosed with cervical cancer, s/p LEEP procedure 11/2020, s/p bilateral breast reduction 3/15/2021.  Pt reports breasts grew in size since the last procedure.  Denies any post-operative complications,   Denies fever, chills, no acute complaints. Feels well today.

## 2022-08-11 NOTE — H&P PST ADULT - LIVING CHILDREN, OB PROFILE
Lab at bedside for blood collection  
Multiple RNs attempt at IV access all unsuccessful. Lab paged for blood collection.   
Pt wheelchaired to restroom for urine specimen.     
4

## 2022-08-11 NOTE — H&P PST ADULT - LAST ECHOCARDIOGRAM
2 years ago  [phone] @ [facility] , as per patient  normal  cardiac evaluation  *** ECHO 10/11/2016 @ [facility] normal EF, grade II diastolic dysfunction ***

## 2022-08-11 NOTE — H&P PST ADULT - NSICDXPASTSURGICALHX_GEN_ALL_CORE_FT
PAST SURGICAL HISTORY:  H/O abdominoplasty 2018    H/O arthroscopy of knee bilateral    H/O  section     H/O LEEP 2020    H/O unilateral nephrectomy left, elective for donation     History of appendectomy 2017    History of ovarian cystectomy 2016    S/P LASIK surgery 2020    Status post breast reduction

## 2022-08-11 NOTE — H&P PST ADULT - MAMMOGRAM, RESULTS OF LAST, PROFILE
bx both breast masses - negative ( as per patient ) <-- Click to add NO pertinent Past Medical History

## 2022-08-16 LAB — SARS-COV-2 N GENE NPH QL NAA+PROBE: NOT DETECTED

## 2022-08-16 PROCEDURE — U0003: CPT

## 2022-08-16 PROCEDURE — 85027 COMPLETE CBC AUTOMATED: CPT

## 2022-08-16 PROCEDURE — 36415 COLL VENOUS BLD VENIPUNCTURE: CPT

## 2022-08-16 PROCEDURE — G0463: CPT

## 2022-08-18 ENCOUNTER — TRANSCRIPTION ENCOUNTER (OUTPATIENT)
Age: 37
End: 2022-08-18

## 2022-08-18 PROBLEM — N64.89 OTHER SPECIFIED DISORDERS OF BREAST: Chronic | Status: ACTIVE | Noted: 2022-08-11

## 2022-08-19 ENCOUNTER — OUTPATIENT (OUTPATIENT)
Dept: OUTPATIENT SERVICES | Facility: HOSPITAL | Age: 37
LOS: 1 days | End: 2022-08-19
Payer: COMMERCIAL

## 2022-08-19 ENCOUNTER — APPOINTMENT (OUTPATIENT)
Dept: PLASTIC SURGERY | Facility: HOSPITAL | Age: 37
End: 2022-08-19

## 2022-08-19 ENCOUNTER — TRANSCRIPTION ENCOUNTER (OUTPATIENT)
Age: 37
End: 2022-08-19

## 2022-08-19 ENCOUNTER — RESULT REVIEW (OUTPATIENT)
Age: 37
End: 2022-08-19

## 2022-08-19 VITALS
RESPIRATION RATE: 14 BRPM | WEIGHT: 164.91 LBS | HEIGHT: 67 IN | DIASTOLIC BLOOD PRESSURE: 60 MMHG | TEMPERATURE: 98 F | OXYGEN SATURATION: 99 % | SYSTOLIC BLOOD PRESSURE: 99 MMHG | HEART RATE: 71 BPM

## 2022-08-19 VITALS
RESPIRATION RATE: 16 BRPM | SYSTOLIC BLOOD PRESSURE: 111 MMHG | OXYGEN SATURATION: 100 % | HEART RATE: 64 BPM | DIASTOLIC BLOOD PRESSURE: 65 MMHG

## 2022-08-19 DIAGNOSIS — Z98.890 OTHER SPECIFIED POSTPROCEDURAL STATES: Chronic | ICD-10-CM

## 2022-08-19 DIAGNOSIS — Z98.890 OTHER SPECIFIED POSTPROCEDURAL STATES: ICD-10-CM

## 2022-08-19 DIAGNOSIS — Z90.49 ACQUIRED ABSENCE OF OTHER SPECIFIED PARTS OF DIGESTIVE TRACT: Chronic | ICD-10-CM

## 2022-08-19 DIAGNOSIS — N64.89 OTHER SPECIFIED DISORDERS OF BREAST: ICD-10-CM

## 2022-08-19 DIAGNOSIS — Z90.5 ACQUIRED ABSENCE OF KIDNEY: Chronic | ICD-10-CM

## 2022-08-19 DIAGNOSIS — Z98.89 OTHER SPECIFIED POSTPROCEDURAL STATES: Chronic | ICD-10-CM

## 2022-08-19 PROCEDURE — 88305 TISSUE EXAM BY PATHOLOGIST: CPT | Mod: 26

## 2022-08-19 PROCEDURE — 14001 TIS TRNFR TRUNK 10.1-30SQCM: CPT | Mod: XS

## 2022-08-19 PROCEDURE — 88305 TISSUE EXAM BY PATHOLOGIST: CPT

## 2022-08-19 PROCEDURE — 15002 WOUND PREP TRK/ARM/LEG: CPT

## 2022-08-19 PROCEDURE — 14302 TIS TRNFR ADDL 30 SQ CM: CPT

## 2022-08-19 PROCEDURE — 14301 TIS TRNFR ANY 30.1-60 SQ CM: CPT

## 2022-08-19 PROCEDURE — C1889: CPT

## 2022-08-19 DEVICE — HEMOSTAT ARISTA 3GR: Type: IMPLANTABLE DEVICE | Status: FUNCTIONAL

## 2022-08-19 RX ORDER — ONDANSETRON 8 MG/1
4 TABLET, FILM COATED ORAL ONCE
Refills: 0 | Status: DISCONTINUED | OUTPATIENT
Start: 2022-08-19 | End: 2022-09-02

## 2022-08-19 RX ORDER — SODIUM CHLORIDE 9 MG/ML
1000 INJECTION, SOLUTION INTRAVENOUS
Refills: 0 | Status: DISCONTINUED | OUTPATIENT
Start: 2022-08-19 | End: 2022-08-19

## 2022-08-19 RX ORDER — HYDROMORPHONE HYDROCHLORIDE 2 MG/ML
0.5 INJECTION INTRAMUSCULAR; INTRAVENOUS; SUBCUTANEOUS
Refills: 0 | Status: DISCONTINUED | OUTPATIENT
Start: 2022-08-19 | End: 2022-08-19

## 2022-08-19 RX ORDER — SODIUM CHLORIDE 9 MG/ML
1000 INJECTION, SOLUTION INTRAVENOUS
Refills: 0 | Status: DISCONTINUED | OUTPATIENT
Start: 2022-08-19 | End: 2022-09-02

## 2022-08-19 RX ORDER — HYDROMORPHONE HYDROCHLORIDE 2 MG/ML
1 INJECTION INTRAMUSCULAR; INTRAVENOUS; SUBCUTANEOUS
Refills: 0 | Status: DISCONTINUED | OUTPATIENT
Start: 2022-08-19 | End: 2022-08-19

## 2022-08-19 RX ADMIN — SODIUM CHLORIDE 50 MILLILITER(S): 9 INJECTION, SOLUTION INTRAVENOUS at 08:34

## 2022-08-19 NOTE — ASU DISCHARGE PLAN (ADULT/PEDIATRIC) - NS MD DC FALL RISK RISK
For information on Fall & Injury Prevention, visit: [email or web address] OR  [email or web address] OR  https://www.cdc.gov/steadi/patient.html

## 2022-08-19 NOTE — ASU PATIENT PROFILE, ADULT - HEALTHCARE QUESTIONS, PROFILE

## 2022-08-19 NOTE — ASU DISCHARGE PLAN (ADULT/PEDIATRIC) - CALL YOUR DOCTOR IF YOU HAVE ANY OF THE FOLLOWING:
[phone], or call 911, or seek immediate medical attention./Bleeding that does not stop/Pain not relieved by Medications/Fever greater than (need to indicate Fahrenheit or Celsius)/Wound/Surgical Site with redness, or foul smelling discharge or pus/Numbness, tingling, color or temperature change to extremity [phone], or call 911, or seek immediate medical attention./Bleeding that does not stop/Swelling that gets worse/Pain not relieved by Medications/Fever greater than (need to indicate Fahrenheit or Celsius)/Wound/Surgical Site with redness, or foul smelling discharge or pus/Numbness, tingling, color or temperature change to extremity/Nausea and vomiting that does not stop

## 2022-08-19 NOTE — ASU DISCHARGE PLAN (ADULT/PEDIATRIC) - CARE PROVIDER_API CALL
John Mendoza)  Physician Assistant Services  [address], [address]  [place] [address]  Phone: [phone]  Fax: [phone]  Scheduled Appointment: 08/23/2022 12:45 PM

## 2022-08-19 NOTE — ASU DISCHARGE PLAN (ADULT/PEDIATRIC) - PROCEDURE
Revision bilateral berast reduction and left hip scar revision - Dr. Gabriel ([phone]). Revision bilateral berast reduction and left hip scar revision - Dr. Gabriel ([phone]). Revision bilateral breast reduction and left hip scar revision - Dr. Gabriel ([phone]). Revision bilateral breast reduction and left hip scar revision - Dr. Gabriel ([phone]).

## 2022-08-19 NOTE — ASU PATIENT PROFILE, ADULT - NS TRANSFER PATIENT BELONGINGS
Cell Phone/PDA (specify)/Jewelry/Money (specify)/Clothing refuses to remove her ear rings/Cell Phone/PDA (specify)/Jewelry/Money (specify)/Clothing

## 2022-08-19 NOTE — ASU DISCHARGE PLAN (ADULT/PEDIATRIC) - PAIN MANAGEMENT
Clindamycin (Abx), Percocet (narcotic pain medication)/Prescriptions electronically submitted to pharmacy from [facility]

## 2022-08-19 NOTE — ASU DISCHARGE PLAN (ADULT/PEDIATRIC) - COMMENTS
Dr. Gabriel's  direct phone number is [phone]. If after office hours (9-5PM M-F), then please wait until normal business hours to call.   You may leave a detailed VM as well. You may also email [email or web address] for any concerns or questions regarding scheduling appointments.  You will see Dr. Gabriel's TERRY Arnold, (John STEWART) for your post operative visit.  You may also contact her directly via email: [email or web address] for any concerns or questions.

## 2022-08-19 NOTE — ASU DISCHARGE PLAN (ADULT/PEDIATRIC) - ASU DC SPECIAL INSTRUCTIONSFT
1. Please follow up with Dr. Gabriel's PA, Catalina, next week TUESDAY 08/23/22 @ 12:45 for your first post operative visit. Your appointment has already been made. Please call the office if you need to make any changes to your appointment at [phone] (during normal business hours M-F 9-5pm).     2. Activity: No overhead reaching, stretching of your arms. Please do NOT sleep on your sides, only sleep on your back. Please avoid any strenuous activities, AVOID bending, stretching, reaching overhead, or any heavy lifting.    3. Dressings: do NOT remove the bra or the dressings. Keep everything intact the way it is.   Some OOZING and blood on the dressing is normal and to be expected. If it becomes saturated w/ BRIGHT red blood that does not stop, please call [phone] for email CATALINA: [email or web address]  Please make sure you are stripping the drains 2-3x a day.   -  4. Medications:  Your medications were sent to your pharmacy. Please take all of the antibiotics.  Please take the prescribed medications as directed.   For MILD pain please take regular over the counter pain medications such as: Advil, Tylenol, Motrin.   Only take the narcotic prescribed pain medication for SEVERE PAIN.   If constipation occurs after taking narcotic pain medications, please take an over the counter stool softener.

## 2022-08-19 NOTE — ASU PATIENT PROFILE, ADULT - FALL HARM RISK - UNIVERSAL INTERVENTIONS
Bed in lowest position, wheels locked, appropriate side rails in place/Call bell, personal items and telephone in reach/Instruct patient to call for assistance before getting out of bed or chair/Non-slip footwear when patient is out of bed/[place] to call system/Physically safe environment - no spills, clutter or unnecessary equipment/Purposeful Proactive Rounding/Room/bathroom lighting operational, light cord in reach

## 2022-08-19 NOTE — BRIEF OPERATIVE NOTE - OPERATION/FINDINGS
I was present for the entire length of the case, there was no other qualified resident to assist  I assisted with hemostasis, exposure, creating of flaps, closure of wound, and placement of dressings.    Bilateral Breast reduction w/ revision of scars  Revision of left abdominal scar  dyan used b/l  exparel used b/l - 20cc's per breast.

## 2022-08-20 ENCOUNTER — NON-APPOINTMENT (OUTPATIENT)
Age: 37
End: 2022-08-20

## 2022-08-20 ENCOUNTER — EMERGENCY (EMERGENCY)
Facility: HOSPITAL | Age: 37
LOS: 1 days | Discharge: HOME | End: 2022-08-20
Attending: EMERGENCY MEDICINE | Admitting: EMERGENCY MEDICINE

## 2022-08-20 VITALS
OXYGEN SATURATION: 99 % | DIASTOLIC BLOOD PRESSURE: 70 MMHG | TEMPERATURE: 97 F | SYSTOLIC BLOOD PRESSURE: 112 MMHG | HEART RATE: 72 BPM | RESPIRATION RATE: 18 BRPM | HEIGHT: 67 IN

## 2022-08-20 DIAGNOSIS — Z98.890 OTHER SPECIFIED POSTPROCEDURAL STATES: Chronic | ICD-10-CM

## 2022-08-20 DIAGNOSIS — Z98.89 OTHER SPECIFIED POSTPROCEDURAL STATES: Chronic | ICD-10-CM

## 2022-08-20 DIAGNOSIS — Z90.49 ACQUIRED ABSENCE OF OTHER SPECIFIED PARTS OF DIGESTIVE TRACT: Chronic | ICD-10-CM

## 2022-08-20 DIAGNOSIS — Z90.5 ACQUIRED ABSENCE OF KIDNEY: Chronic | ICD-10-CM

## 2022-08-20 PROCEDURE — 99284 EMERGENCY DEPT VISIT MOD MDM: CPT

## 2022-08-20 NOTE — ED ADULT TRIAGE NOTE - CHIEF COMPLAINT QUOTE
pt c/o pain and bleeding at site of left NANCY drain. pt had breast reduction surgery yesterday and [facility]. no other complications to report. denies PMHx.

## 2022-08-21 VITALS
OXYGEN SATURATION: 97 % | HEART RATE: 68 BPM | SYSTOLIC BLOOD PRESSURE: 115 MMHG | RESPIRATION RATE: 16 BRPM | DIASTOLIC BLOOD PRESSURE: 71 MMHG

## 2022-08-21 NOTE — ED PROVIDER NOTE - CLINICAL SUMMARY MEDICAL DECISION MAKING FREE TEXT BOX
37y female pt s/p breast reduction coming in for bleeding from L adriane site after son pulled ADRIANE. Patient not endorsing pain but is endorsing lightheadedness. On exam jps in place and with no acute bleeding. Will assess H&H and type and screen. Will page plastics.

## 2022-08-21 NOTE — ED PROVIDER NOTE - PHYSICAL EXAMINATION
GENERAL: Awake, alert, NAD  HEENT: NC/AT, moist mucous membranes  LUNGS: CTAB, no wheezes or crackles   CARDIAC: RRR, no m/r/g  ABDOMEN: Soft, non tender, non distended, no rebound, no guarding  EXT: No edema, no calf tenderness  NEURO: A&Ox3. Moving all extremities.  SKIN: Warm and dry. JPs in place BL breasts laterally. JPs with serosang drainage. No acute bleeding

## 2022-08-21 NOTE — ED PROVIDER NOTE - NS ED ROS FT
CONST: no fevers, no chills. +lightheaded  EYES: no pain, no vision changes  ENT: no sore throat, no ear pain, no change in hearing  CV: no chest pain, no leg swelling  RESP: no shortness of breath, no cough  ABD: no abdominal pain, no nausea, no vomiting, no diarrhea  : no dysuria, no flank pain, no hematuria  MSK: no back pain, no extremity pain  NEURO: no headache or additional neurologic complaints  SKIN:  no rash

## 2022-08-21 NOTE — ED PROVIDER NOTE - ATTENDING CONTRIBUTION TO CARE
38 y/o F with recent breast reduction on 8/19/22 here with drainage from L NANCY drain.  Pt reporting pain and drainage from site and believes her son make have tugged on it accidentally.  Pt denies other complaints and refuses to allow for my examination, stating that she is just here to see the plastic surgeon.  VS wnl and pt is nontoxic appearing.  Will consult plastics, dispo per recs.

## 2022-08-21 NOTE — ED PROVIDER NOTE - NSFOLLOWUPINSTRUCTIONS_ED_ALL_ED_FT
You were seen for NANCY drain complications. Please follow up with Dr. Esvin Gabriel for further management and complications. Return to the ED for any concerns of infection or recurrent bleeding. May continue taking Tylenol for pain. Continue activities as tolerated.    Please remember to have drains to suction and empty drains once full.

## 2022-08-21 NOTE — ED PROVIDER NOTE - OBJECTIVE STATEMENT
37y female pt with no significant PMHx who presents to ED s/p breast reduction endorsing L NANCY drain bleed profusely around 10 pm. Now bleeding controlled. Not complaining for pain just discomfort. Has been taking tylenol throughout the day. Thinks son may have pulled on the NANCY prior to the bleeding.

## 2022-08-21 NOTE — ED ADULT NURSE NOTE - OBJECTIVE STATEMENT
Break RN note- patient arrives to the ED for concern her NANCY drain to her left breast was displaced. Patient reports she had a breast reduction yesterday. Patient reports she was laying down with her son and when she got up blood started pouring out around the drain site. Patient reports also after this incident she started having drainage out of her bilateral NANCY drains. Patient reports slight dizziness. Patient denying blood draws- repots the dizziness is because she "had anesthesia. No s/sx of infection noted to NANCY drain site. Patient breathing even and nonlabored. No acute distress. Patient denies any headache, chest pain, dizziness, nausea, vomiting, chest pain, or other concerns. Safety maintained. Patient stable upon exiting the room.

## 2022-08-21 NOTE — ED PROVIDER NOTE - NSICDXPASTMEDICALHX_GEN_ALL_CORE_FT
PAST MEDICAL HISTORY:  Cervical cancer abnormal pap , s/p LEEP  11/2020 , no chemo , no radiation , denies oncologist , pt  planning  hysteretomy in the future    Hypertrophy of breast     Kidney donor 2012 stable renal function    Mammogram abnormal s/p breast bx of both breasts 12/2020, as per patient - path was benign    Other specified disorders of breast     Ovarian cancer x 10 years   Chemo x 6 month

## 2022-08-21 NOTE — CONSULT NOTE ADULT - SUBJECTIVE AND OBJECTIVE BOX
Plastic Surgery Consult Note  (pg [facility]: [id number], NS: [phone])  ===================================================  SUBJECTIVE    HPI:   37F s/p breast reduction revision 2022 p/w c/o blood colored drainage from L breast. Thinks her son may have pulled on the left NANCY drain. Denies pain, just c/o mild discomfort. Reports that she has not touched the drains or drain bulbs at all since the surgery, but that not much has collected in them.    ___________________________________________________  PAST MEDICAL & SURGICAL HISTORY:  Ovarian cancer  x 10 years   Chemo x 6 month      Kidney donor   stable renal function      Cervical cancer  abnormal pap , s/p LEEP  2020 , no chemo , no radiation , denies oncologist , pt  planning  hysteretomy in the future      Hypertrophy of breast      Mammogram abnormal  s/p breast bx of both breasts 2020, as per patient - path was benign      Other specified disorders of breast      H/O  section        H/O unilateral nephrectomy  left, elective for donation       H/O abdominoplasty  2018      History of appendectomy  2017      H/O LEEP  2020      H/O arthroscopy of knee  bilateral      History of ovarian cystectomy  2016      S/P LASIK surgery  2020      Status post breast reduction          Allergies    morphine (Anaphylaxis)  penicillin (Other)    Intolerances      ===================================================  OBJECTIVE:    Vital Signs Last 24 Hrs  T(C): 36.3 (20 Aug 2022 23:41), Max: 36.3 (20 Aug 2022 23:41)  T(F): 97.4 (20 Aug 2022 23:41), Max: 97.4 (20 Aug 2022 23:41)  HR: 68 (21 Aug 2022 00:49) (68 - 72)  BP: 115/71 (21 Aug 2022 00:49) (112/70 - 115/71)  BP(mean): --  RR: 16 (21 Aug 2022 00:49) (16 - 18)  SpO2: 97% (21 Aug 2022 00:49) (97% - 99%)    Parameters below as of 21 Aug 2022 00:49  Patient On (Oxygen Delivery Method): room air    CAPILLARY BLOOD GLUCOSE        I&O's Detail    ___________________________________________________  EXAM:  General: Well developed, well nourished, NAD  Neuro: Alert and oriented, no focal deficits, moves all extremities spontaneously  HEENT: NCAT, EOMI, anicteric, mucosa moist  Respiratory: Airway patent, respirations unlabored    Breasts:  BL breasts s/p reduction. BL IMF incisions intact. BL breasts soft, no collection. BL NAC viable. L breast princess-areolar incision c/d/i. BL NANCY drain bulbs off suction with small amount of serosanguinous drainage in the bulb. The drains were placed to suction and approximately 50 mL of serosang drainage was collected in each bulb. The drains were emptied, measured, and put back to suction and stripped without any more fluid collecting in the bulbs.    Extremities: No edema, sensation and movement grossly intact  Skin: Warm, dry, appropriate color

## 2022-08-21 NOTE — ED ADULT NURSE NOTE - NSIMPLEMENTINTERV_GEN_ALL_ED
Implemented All Fall with Harm Risk Interventions:  [place] to call system. Call bell, personal items and telephone within reach. Instruct patient to call for assistance. Room bathroom lighting operational. Non-slip footwear when patient is off stretcher. Physically safe environment: no spills, clutter or unnecessary equipment. Stretcher in lowest position, wheels locked, appropriate side rails in place. Provide visual cue, wrist band, yellow gown, etc. Monitor gait and stability. Monitor for mental status changes and reorient to person, place, and time. Review medications for side effects contributing to fall risk. Reinforce activity limits and safety measures with patient and family. Provide visual clues: red socks.

## 2022-08-21 NOTE — CONSULT NOTE ADULT - ASSESSMENT
37F s/p breast reduction revision 8/19/2022 p/w c/o blood colored drainage from L breast. Surgical sites intact. Drain bulbs had been left off suction so serosanguinous drainage was escaping around the drain site. Educated patient on how to put the bulbs on suction and how to empty and record the output.  - continue NANCY drains  - continue surgical bra  - PRN pain medication  - avoid heavy lifting, strenuous activity  - f/u with Dr. Gabriel in the office as scheduled    Schuyler Felton PGY6  Plastic Surgery (pg [facility]: [id number], NS: [phone])

## 2022-08-21 NOTE — ED ADULT NURSE NOTE - CHIEF COMPLAINT QUOTE
pt c/o pain and bleeding at site of left NANCY drain. pt had breast reduction surgery yesterday and [facility]. no other complications to report. denies PMHx.

## 2022-08-21 NOTE — ED PROVIDER NOTE - PATIENT PORTAL LINK FT
You can access the FollowMyHealth Patient Portal offered by [facility] by registering at the following website: [email or web address]. By joining [employer]’s FollowMyHealth portal, you will also be able to view your health information using other applications (apps) compatible with our system.

## 2022-08-23 ENCOUNTER — APPOINTMENT (OUTPATIENT)
Dept: PLASTIC SURGERY | Facility: CLINIC | Age: 37
End: 2022-08-23

## 2022-08-23 VITALS
HEART RATE: 62 BPM | TEMPERATURE: 97.2 F | BODY MASS INDEX: 26.06 KG/M2 | SYSTOLIC BLOOD PRESSURE: 92 MMHG | OXYGEN SATURATION: 98 % | DIASTOLIC BLOOD PRESSURE: 60 MMHG | WEIGHT: 166 LBS | HEIGHT: 67 IN

## 2022-08-23 LAB — SURGICAL PATHOLOGY STUDY: SIGNIFICANT CHANGE UP

## 2022-08-23 PROCEDURE — 99024 POSTOP FOLLOW-UP VISIT: CPT

## 2022-08-30 ENCOUNTER — APPOINTMENT (OUTPATIENT)
Dept: PLASTIC SURGERY | Facility: CLINIC | Age: 37
End: 2022-08-30

## 2022-08-30 VITALS
BODY MASS INDEX: 26.06 KG/M2 | WEIGHT: 166 LBS | HEIGHT: 67 IN | TEMPERATURE: 98.1 F | DIASTOLIC BLOOD PRESSURE: 65 MMHG | SYSTOLIC BLOOD PRESSURE: 98 MMHG | HEART RATE: 72 BPM | OXYGEN SATURATION: 99 %

## 2022-08-30 DIAGNOSIS — L91.0 HYPERTROPHIC SCAR: ICD-10-CM

## 2022-08-30 DIAGNOSIS — Z98.890 OTHER SPECIFIED POSTPROCEDURAL STATES: ICD-10-CM

## 2022-08-30 PROCEDURE — 99024 POSTOP FOLLOW-UP VISIT: CPT

## 2022-09-06 ENCOUNTER — APPOINTMENT (OUTPATIENT)
Dept: PLASTIC SURGERY | Facility: CLINIC | Age: 37
End: 2022-09-06

## 2022-09-06 VITALS
SYSTOLIC BLOOD PRESSURE: 97 MMHG | OXYGEN SATURATION: 97 % | WEIGHT: 166 LBS | TEMPERATURE: 97.7 F | BODY MASS INDEX: 26.06 KG/M2 | DIASTOLIC BLOOD PRESSURE: 62 MMHG | HEART RATE: 86 BPM | HEIGHT: 67 IN

## 2022-09-06 DIAGNOSIS — N62 HYPERTROPHY OF BREAST: ICD-10-CM

## 2022-09-06 DIAGNOSIS — E88.1 LIPODYSTROPHY, NOT ELSEWHERE CLASSIFIED: ICD-10-CM

## 2022-09-06 PROCEDURE — 99024 POSTOP FOLLOW-UP VISIT: CPT

## 2022-09-07 ENCOUNTER — APPOINTMENT (OUTPATIENT)
Dept: ANTEPARTUM | Facility: CLINIC | Age: 37
End: 2022-09-07

## 2022-09-07 ENCOUNTER — APPOINTMENT (OUTPATIENT)
Dept: OBGYN | Facility: CLINIC | Age: 37
End: 2022-09-07

## 2022-09-07 NOTE — ED ADULT NURSE NOTE - OBJECTIVE STATEMENT
Pt received in intake room 3, A&OX4, ambulatory. Pt reports donating left kidney several years ago. Pt reports left sided abdominal pain and flank pain for past two days. pt reports a "bulging spot" when she lies down. pt was seen at urgent care and was sent to ED to rule out hernia. Pt denies any urinary symptoms, nausea, vomiting. Pt reports LMP started 3 days ago. 20G placed in RT AC, labs collected and sent. UCG running at this time. Will continue to monitor
Quality 110: Preventive Care And Screening: Influenza Immunization: Influenza Immunization previously received during influenza season
Detail Level: Detailed
Additional Notes: Patient received the COVID vaccine
Quality 226: Preventive Care And Screening: Tobacco Use: Screening And Cessation Intervention: Patient screened for tobacco use and is an ex/non-smoker

## 2022-09-22 ENCOUNTER — APPOINTMENT (OUTPATIENT)
Dept: PLASTIC SURGERY | Facility: CLINIC | Age: 37
End: 2022-09-22

## 2022-09-26 NOTE — OB RN TRIAGE NOTE - NS_OBGYNHISTORY_OBGYN_ALL_OB_FT
Quality 130: Documentation Of Current Medications In The Medical Record: Current Medications Documented Quality 431: Preventive Care And Screening: Unhealthy Alcohol Use - Screening: Patient not identified as an unhealthy alcohol user when screened for unhealthy alcohol use using a systematic screening method Detail Level: Zone Quality 226: Preventive Care And Screening: Tobacco Use: Screening And Cessation Intervention: Patient screened for tobacco use and is an ex/non-smoker  6 female 7#11  c/s placenta previa female 07 7#8  2011 male  7#11

## 2023-01-18 NOTE — OB PROVIDER DELIVERY SUMMARY - NSVBACSUCCESSFUL_OBGYN_ALL_OB
Patient had her appendix removed after a rupture by Dr. Nupur Gentile. Patient made a fully recovery but recently felt a sharp pain after playing volleyball. Patient's mother called as she wanted to speak with a nurse to discuss if this was a normal thing or if she needed to be seen back at the office again.      Please call back patient's mother a:    [phone]
S/w pts mother in regards to pt c/o of \"right side pain\" last night at volleyball practice. She states pt has played multiple games over the last few days, layed down and felt discomfort to her right side. Denies temp, n/v/d, constipation, loss of appetite. She states today her daughter was feeling fine. I assured her this pain does not reflect any complication for her surgery in October. If any of the above s/s do start to give our office a call.   She verbalized understanding
Yes

## 2023-04-22 NOTE — OB PROVIDER TRIAGE NOTE - NS_GBS_INFANT_INVASIVE_OBGYN_ALL_OB_FT
ED Attending Physician Note      Patient : Fatemeh Belcher Age: 65 year old Sex: female   MRN: [id number] Encounter Date: 4/22/2023      History     Chief Complaint   Patient presents with   • Shortness of Breath   • Shoulder Pain       65-year-old woman with a past medical history of genetic test positive for scleroderma (has had CT scans which did not reveal any evidence of fibrosis), STEPHANIE, anxiety, hyperlipidemia, breast cancer now in remission presents with 8 days of cough.  Patient reports that he was seen in an outpatient clinic was given a small dose of prednisone which she has completed.  Several days ago she was also seen by her primary care physician as she was continuing to have cough wheeze.  Her primary care physician heard some crackles in her lung and prescribed her empirically azithromycin and Augmentin for presumed pneumonia.  Patient reports that she had a x-ray prior to being seen by her PCP which was negative.  Over the course of the last week she feels like she has been getting progressively better.  Initially she was having some borderline fevers which have now resolved.  Her energy is better and her ability to walk without developing any shortness of breath has improved.  She denies any associated chest pain.  She reports that last night she heard some \"rattling \"in her lungs while she was laying flat and did have a persistent cough last night so she decided to come into the hospital.           No Known Allergies    Current Discharge Medication List      Prior to Admission Medications    Details   escitalopram (LEXAPRO) 20 MG tablet Take 1 tablet by mouth daily.  Qty: 90 tablet, Refills: 1      pantoprazole (PROTONIX) 40 MG tablet Take 1 tablet by mouth daily as needed.  Qty: 30 tablet, Refills: 2      rosuvastatin (CRESTOR) 10 MG tablet Take 1 tablet by mouth daily.  Qty: 90 tablet, Refills: 3      rosuvastatin (CRESTOR) 10 MG tablet Take 1 tablet by mouth daily.  Qty: 90 tablet, Refills: 3       pantoprazole (PROTONIX) 40 MG tablet Take 1 tablet by mouth daily before a meal.  Qty: 90 tablet, Refills: 3      escitalopram (LEXAPRO) 20 MG tablet Take 20 mg by mouth.      anastrozole (ARIMIDEX) 1 MG tablet Take 1 mg by mouth daily.      aspirin 81 MG chewable tablet Chew 81 mg by mouth daily.      cyclobenzaprine (FLEXERIL) 5 MG tablet Take 1 tablet by mouth 3 times daily as needed for Muscle spasms.  Qty: 30 tablet, Refills: 0      Calcium Carbonate (CALTRATE 600 PO) Take 2 tablets by mouth daily.      Cholecalciferol (vitamin D) 50 mcg (2,000 units) capsule              Current Discharge Medication List          Past Medical History:   Diagnosis Date   • Breast cancer (CMS/HCC)    • Depression    • GERD (gastroesophageal reflux disease)    • History of bone scan 2011    normal   • History of colonoscopy    • Hx of mammogram    • Hypercholesterolemia    • Hyperlipidemia    • Hypertension        Past Surgical History:   Procedure Laterality Date   • BREAST LUMPECTOMY Left 2020   •  SECTION, LOW TRANSVERSE  1992   • CHOLECYSTECTOMY  2009   • RADIATION TREATMENT  2021    a month long 21 treatments @ [facility]        Family History   Problem Relation Age of Onset   • Lung Disease Mother    • Heart disease Father    • Cancer Father         testicular   • Cancer, Lung Father    • Cancer Sister         testicular with mets to lung       Social History     Tobacco Use   • Smoking status: Never   • Smokeless tobacco: Never   Substance Use Topics   • Alcohol use: Yes     Comment: couple times a week    • Drug use: Never       Review of Systems   Constitutional: Negative for fatigue and fever.   HENT: Negative for congestion and sore throat.    Eyes: Negative for visual disturbance.   Respiratory: Positive for cough. Negative for shortness of breath.    Cardiovascular: Negative for chest pain and leg swelling.   Gastrointestinal: Negative for abdominal distention and abdominal pain.    Genitourinary: Negative for dysuria and frequency.   Musculoskeletal: Negative for myalgias.   Skin: Negative for rash.   Neurological: Negative for weakness and numbness.        Physical Exam     ED Triage Vitals [04/22/23 1002]   ED Triage Vitals Group      Temp 98.6 °F (37 °C)      Heart Rate 72      Resp 18      BP (!) 148/90      SpO2 97 %      EtCO2 mmHg       Height       Weight       Weight Scale Used       BMI (Calculated)       IBW/kg (Calculated)        Physical Exam  Vitals reviewed.   Constitutional:       Appearance: Normal appearance.   HENT:      Head: Normocephalic and atraumatic.      Mouth/Throat:      Mouth: Mucous membranes are moist.      Neck: Normal range of motion and neck supple.   Eyes:      Conjunctiva/sclera: Conjunctivae normal.   Cardiovascular:      Rate and Rhythm: Normal rate and regular rhythm.      Pulses: Normal pulses.      Heart sounds: Normal heart sounds.      Comments: No LE edema, no jvd  Pulmonary:      Effort: Pulmonary effort is normal.      Breath sounds: Normal breath sounds.   Abdominal:      General: There is no distension.      Palpations: Abdomen is soft.      Tenderness: There is no abdominal tenderness. There is no guarding or rebound.   Musculoskeletal:         General: Normal range of motion.   Skin:     General: Skin is warm and dry.      Capillary Refill: Capillary refill takes less than 2 seconds.   Neurological:      General: No focal deficit present.      Mental Status: She is alert and oriented to person, place, and time.          ED Course     Procedures    Lab Results     Results for orders placed or performed during the hospital encounter of 04/22/23   Comprehensive Metabolic Panel   Result Value Ref Range    Fasting Status      Sodium 142 135 - 145 mmol/L    Potassium 3.7 3.4 - 5.1 mmol/L    Chloride 110 97 - 110 mmol/L    Carbon Dioxide 28 21 - 32 mmol/L    Anion Gap 8 7 - 19 mmol/L    Glucose 84 70 - 99 mg/dL    BUN 10 6 - 20 mg/dL    Creatinine  0.62 0.51 - 0.95 mg/dL    Glomerular Filtration Rate >90 >=60    BUN/Cr 16 7 - 25    Calcium 9.0 8.4 - 10.2 mg/dL    Bilirubin, Total 0.4 0.2 - 1.0 mg/dL    GOT/AST 15 <=37 Units/L    GPT/ALT 26 <64 Units/L    Alkaline Phosphatase 122 (H) 45 - 117 Units/L    Albumin 3.8 3.6 - 5.1 g/dL    Protein, Total 7.5 6.4 - 8.2 g/dL    Globulin 3.7 2.0 - 4.0 g/dL    A/G Ratio 1.0 1.0 - 2.4   CBC with Automated Differential (performable only)   Result Value Ref Range    WBC 4.6 4.2 - 11.0 K/mcL    RBC 4.47 4.00 - 5.20 mil/mcL    HGB 13.1 12.0 - 15.5 g/dL    HCT 40.2 36.0 - 46.5 %    MCV 89.9 78.0 - 100.0 fl    MCH 29.3 26.0 - 34.0 pg    MCHC 32.6 32.0 - 36.5 g/dL    RDW-CV 13.0 11.0 - 15.0 %    RDW-SD 42.8 39.0 - 50.0 fL     140 - 450 K/mcL    NRBC 0 <=0 /100 WBC    Neutrophil, Percent 56 %    Lymphocytes, Percent 34 %    Mono, Percent 8 %    Eosinophils, Percent 1 %    Basophils, Percent 1 %    Immature Granulocytes 0 %    Absolute Neutrophils 2.6 1.8 - 7.7 K/mcL    Absolute Lymphocytes 1.6 1.0 - 4.0 K/mcL    Absolute Monocytes 0.4 0.3 - 0.9 K/mcL    Absolute Eosinophils  0.1 0.0 - 0.5 K/mcL    Absolute Basophils 0.0 0.0 - 0.3 K/mcL    Absolute Immature Granulocytes 0.0 0.0 - 0.2 K/mcL   COVID/Flu/RSV panel   Result Value Ref Range    Rapid SARS-COV-2 by PCR Not Detected Not Detected / Detected / Presumptive Positive / Inhibitors present    Influenza A by PCR Not Detected Not Detected    Influenza B by PCR Not Detected Not Detected    RSV BY PCR Not Detected Not Detected    Isolation Guidelines      Procedural Comment     TROPONIN I, HIGH SENSITIVITY   Result Value Ref Range    Troponin I, High Sensitivity 5 <52 ng/L       EKG Results     EKG: EKG showing normal sinus rhythm, no ST segment elevation or depression, no evidence of acute infarction, EKG interpreted by ED MD       Radiology Results     XR CHEST PA AND LATERAL 2 VIEWS   Final Result   No evidence of acute cardiopulmonary pathology.      Electronically Signed  by: MARIELLE CONTRERAS DO    Signed on: 4/22/2023 10:51 AM    Workstation ID: [id number]           ED Medication Orders (From admission, onward)    None          MDM    Patient above presents emerged department for evaluation of cough for 8 days.  Patient is hemodynamically stable and well-appearing.    -Patient's lungs are clear.  No wheezing.  No focal abnormalities to suggest PNA  -Patient systemically is sounds to be improving.  Her systemic symptoms are improving over the course last week.  She does feel that she has been improving overall but she is concerned with a rattling in her chest last night.  There is no evidence of any persistent bacterial pneumonia or other serious bacterial illness at this time.  -Basic labs are reviewed.  White blood cell count is within normal limits.  Labs are otherwise grossly reassuring  Troponin negative  -Reassess    1:04 PM: Patient continues to rest comfortably in bed.  Rapid COVID swab negative.  Walking pulse ox was within normal limits.  Patient was counseled to follow-up with her primary care physician as this is likely a viral process.  Counseled to continue the Augmentin as she did have some clinical improvement on this.  Patient discharged home with strict return precautions which she confirmed she understood.           Clinical Impression     ED Diagnosis   1. Acute viral bronchitis        2. Acute cough              Disposition        Discharge 4/22/2023  1:05 PM  Fatemeh Belcher discharge to home/self care.        Follow up  Leyla Lau MD  [address] [facility] [address]  [phone]    Schedule an appointment as soon as possible for a visit in 2 days         Amarjit Stewart MD   4/22/2023 10:58 AM                      Amarjit Stewart MD  04/22/23 3073     N/A

## 2023-05-04 ENCOUNTER — NON-APPOINTMENT (OUTPATIENT)
Age: 38
End: 2023-05-04

## 2023-05-11 ENCOUNTER — APPOINTMENT (OUTPATIENT)
Dept: PLASTIC SURGERY | Facility: CLINIC | Age: 38
End: 2023-05-11

## 2023-05-31 ENCOUNTER — NON-APPOINTMENT (OUTPATIENT)
Age: 38
End: 2023-05-31

## 2023-06-07 ENCOUNTER — APPOINTMENT (OUTPATIENT)
Dept: OBGYN | Facility: CLINIC | Age: 38
End: 2023-06-07

## 2023-06-15 ENCOUNTER — APPOINTMENT (OUTPATIENT)
Dept: OBGYN | Facility: CLINIC | Age: 38
End: 2023-06-15

## 2023-10-13 ENCOUNTER — APPOINTMENT (OUTPATIENT)
Dept: ORTHOPEDIC SURGERY | Facility: CLINIC | Age: 38
End: 2023-10-13

## 2023-11-23 ENCOUNTER — NON-APPOINTMENT (OUTPATIENT)
Age: 38
End: 2023-11-23

## 2023-11-24 ENCOUNTER — EMERGENCY (EMERGENCY)
Facility: HOSPITAL | Age: 38
LOS: 1 days | Discharge: HOME | End: 2023-11-24
Attending: STUDENT IN AN ORGANIZED HEALTH CARE EDUCATION/TRAINING PROGRAM | Admitting: STUDENT IN AN ORGANIZED HEALTH CARE EDUCATION/TRAINING PROGRAM
Payer: COMMERCIAL

## 2023-11-24 VITALS
RESPIRATION RATE: 18 BRPM | TEMPERATURE: 98 F | OXYGEN SATURATION: 98 % | DIASTOLIC BLOOD PRESSURE: 75 MMHG | SYSTOLIC BLOOD PRESSURE: 109 MMHG | HEART RATE: 71 BPM

## 2023-11-24 DIAGNOSIS — Z98.890 OTHER SPECIFIED POSTPROCEDURAL STATES: Chronic | ICD-10-CM

## 2023-11-24 DIAGNOSIS — Z90.49 ACQUIRED ABSENCE OF OTHER SPECIFIED PARTS OF DIGESTIVE TRACT: Chronic | ICD-10-CM

## 2023-11-24 DIAGNOSIS — Z90.5 ACQUIRED ABSENCE OF KIDNEY: Chronic | ICD-10-CM

## 2023-11-24 DIAGNOSIS — Z98.89 OTHER SPECIFIED POSTPROCEDURAL STATES: Chronic | ICD-10-CM

## 2023-11-24 LAB
ALBUMIN SERPL ELPH-MCNC: 3.5 G/DL — SIGNIFICANT CHANGE UP (ref 3.3–5)
ALBUMIN SERPL ELPH-MCNC: 3.5 G/DL — SIGNIFICANT CHANGE UP (ref 3.3–5)
ALP SERPL-CCNC: 63 U/L — SIGNIFICANT CHANGE UP (ref 40–120)
ALP SERPL-CCNC: 63 U/L — SIGNIFICANT CHANGE UP (ref 40–120)
ALT FLD-CCNC: 10 U/L — SIGNIFICANT CHANGE UP (ref 4–33)
ALT FLD-CCNC: 10 U/L — SIGNIFICANT CHANGE UP (ref 4–33)
ANION GAP SERPL CALC-SCNC: 8 MMOL/L — SIGNIFICANT CHANGE UP (ref 7–14)
ANION GAP SERPL CALC-SCNC: 8 MMOL/L — SIGNIFICANT CHANGE UP (ref 7–14)
ANISOCYTOSIS BLD QL: SLIGHT — SIGNIFICANT CHANGE UP
ANISOCYTOSIS BLD QL: SLIGHT — SIGNIFICANT CHANGE UP
APTT BLD: 33 SEC — SIGNIFICANT CHANGE UP (ref 24.5–35.6)
APTT BLD: 33 SEC — SIGNIFICANT CHANGE UP (ref 24.5–35.6)
AST SERPL-CCNC: 13 U/L — SIGNIFICANT CHANGE UP (ref 4–32)
AST SERPL-CCNC: 13 U/L — SIGNIFICANT CHANGE UP (ref 4–32)
BASOPHILS # BLD AUTO: 0.05 K/UL — SIGNIFICANT CHANGE UP (ref 0–0.2)
BASOPHILS # BLD AUTO: 0.05 K/UL — SIGNIFICANT CHANGE UP (ref 0–0.2)
BASOPHILS NFR BLD AUTO: 1.8 % — SIGNIFICANT CHANGE UP (ref 0–2)
BASOPHILS NFR BLD AUTO: 1.8 % — SIGNIFICANT CHANGE UP (ref 0–2)
BILIRUB SERPL-MCNC: <0.2 MG/DL — SIGNIFICANT CHANGE UP (ref 0.2–1.2)
BILIRUB SERPL-MCNC: <0.2 MG/DL — SIGNIFICANT CHANGE UP (ref 0.2–1.2)
BUN SERPL-MCNC: 8 MG/DL — SIGNIFICANT CHANGE UP (ref 7–23)
BUN SERPL-MCNC: 8 MG/DL — SIGNIFICANT CHANGE UP (ref 7–23)
CALCIUM SERPL-MCNC: 8.1 MG/DL — LOW (ref 8.4–10.5)
CALCIUM SERPL-MCNC: 8.1 MG/DL — LOW (ref 8.4–10.5)
CHLORIDE SERPL-SCNC: 106 MMOL/L — SIGNIFICANT CHANGE UP (ref 98–107)
CHLORIDE SERPL-SCNC: 106 MMOL/L — SIGNIFICANT CHANGE UP (ref 98–107)
CO2 SERPL-SCNC: 25 MMOL/L — SIGNIFICANT CHANGE UP (ref 22–31)
CO2 SERPL-SCNC: 25 MMOL/L — SIGNIFICANT CHANGE UP (ref 22–31)
CREAT SERPL-MCNC: 0.96 MG/DL — SIGNIFICANT CHANGE UP (ref 0.5–1.3)
CREAT SERPL-MCNC: 0.96 MG/DL — SIGNIFICANT CHANGE UP (ref 0.5–1.3)
D DIMER BLD IA.RAPID-MCNC: 183 NG/ML DDU — SIGNIFICANT CHANGE UP
D DIMER BLD IA.RAPID-MCNC: 183 NG/ML DDU — SIGNIFICANT CHANGE UP
EGFR: 78 ML/MIN/1.73M2 — SIGNIFICANT CHANGE UP
EGFR: 78 ML/MIN/1.73M2 — SIGNIFICANT CHANGE UP
EOSINOPHIL # BLD AUTO: 0 K/UL — SIGNIFICANT CHANGE UP (ref 0–0.5)
EOSINOPHIL # BLD AUTO: 0 K/UL — SIGNIFICANT CHANGE UP (ref 0–0.5)
EOSINOPHIL NFR BLD AUTO: 0 % — SIGNIFICANT CHANGE UP (ref 0–6)
EOSINOPHIL NFR BLD AUTO: 0 % — SIGNIFICANT CHANGE UP (ref 0–6)
GLUCOSE SERPL-MCNC: 80 MG/DL — SIGNIFICANT CHANGE UP (ref 70–99)
GLUCOSE SERPL-MCNC: 80 MG/DL — SIGNIFICANT CHANGE UP (ref 70–99)
HCG SERPL-ACNC: <1 MIU/ML — SIGNIFICANT CHANGE UP
HCG SERPL-ACNC: <1 MIU/ML — SIGNIFICANT CHANGE UP
HCT VFR BLD CALC: 39 % — SIGNIFICANT CHANGE UP (ref 34.5–45)
HCT VFR BLD CALC: 39 % — SIGNIFICANT CHANGE UP (ref 34.5–45)
HGB BLD-MCNC: 12.8 G/DL — SIGNIFICANT CHANGE UP (ref 11.5–15.5)
HGB BLD-MCNC: 12.8 G/DL — SIGNIFICANT CHANGE UP (ref 11.5–15.5)
HIV 1+2 AB+HIV1 P24 AG SERPL QL IA: SIGNIFICANT CHANGE UP
HIV 1+2 AB+HIV1 P24 AG SERPL QL IA: SIGNIFICANT CHANGE UP
IANC: 0.73 K/UL — LOW (ref 1.8–7.4)
IANC: 0.73 K/UL — LOW (ref 1.8–7.4)
INR BLD: 1.05 RATIO — SIGNIFICANT CHANGE UP (ref 0.85–1.18)
INR BLD: 1.05 RATIO — SIGNIFICANT CHANGE UP (ref 0.85–1.18)
LYMPHOCYTES # BLD AUTO: 1.12 K/UL — SIGNIFICANT CHANGE UP (ref 1–3.3)
LYMPHOCYTES # BLD AUTO: 1.12 K/UL — SIGNIFICANT CHANGE UP (ref 1–3.3)
LYMPHOCYTES # BLD AUTO: 44.2 % — HIGH (ref 13–44)
LYMPHOCYTES # BLD AUTO: 44.2 % — HIGH (ref 13–44)
MACROCYTES BLD QL: SLIGHT — SIGNIFICANT CHANGE UP
MACROCYTES BLD QL: SLIGHT — SIGNIFICANT CHANGE UP
MCHC RBC-ENTMCNC: 29.6 PG — SIGNIFICANT CHANGE UP (ref 27–34)
MCHC RBC-ENTMCNC: 29.6 PG — SIGNIFICANT CHANGE UP (ref 27–34)
MCHC RBC-ENTMCNC: 32.8 GM/DL — SIGNIFICANT CHANGE UP (ref 32–36)
MCHC RBC-ENTMCNC: 32.8 GM/DL — SIGNIFICANT CHANGE UP (ref 32–36)
MCV RBC AUTO: 90.1 FL — SIGNIFICANT CHANGE UP (ref 80–100)
MCV RBC AUTO: 90.1 FL — SIGNIFICANT CHANGE UP (ref 80–100)
MONOCYTES # BLD AUTO: 0.25 K/UL — SIGNIFICANT CHANGE UP (ref 0–0.9)
MONOCYTES # BLD AUTO: 0.25 K/UL — SIGNIFICANT CHANGE UP (ref 0–0.9)
MONOCYTES NFR BLD AUTO: 9.9 % — SIGNIFICANT CHANGE UP (ref 2–14)
MONOCYTES NFR BLD AUTO: 9.9 % — SIGNIFICANT CHANGE UP (ref 2–14)
NEUTROPHILS # BLD AUTO: 1.05 K/UL — LOW (ref 1.8–7.4)
NEUTROPHILS # BLD AUTO: 1.05 K/UL — LOW (ref 1.8–7.4)
NEUTROPHILS NFR BLD AUTO: 40.5 % — LOW (ref 43–77)
NEUTROPHILS NFR BLD AUTO: 40.5 % — LOW (ref 43–77)
NEUTS BAND # BLD: 0.9 % — SIGNIFICANT CHANGE UP (ref 0–6)
NEUTS BAND # BLD: 0.9 % — SIGNIFICANT CHANGE UP (ref 0–6)
NT-PROBNP SERPL-SCNC: 42 PG/ML — SIGNIFICANT CHANGE UP
NT-PROBNP SERPL-SCNC: 42 PG/ML — SIGNIFICANT CHANGE UP
PLAT MORPH BLD: NORMAL — SIGNIFICANT CHANGE UP
PLAT MORPH BLD: NORMAL — SIGNIFICANT CHANGE UP
PLATELET # BLD AUTO: 149 K/UL — LOW (ref 150–400)
PLATELET # BLD AUTO: 149 K/UL — LOW (ref 150–400)
PLATELET COUNT - ESTIMATE: NORMAL — SIGNIFICANT CHANGE UP
PLATELET COUNT - ESTIMATE: NORMAL — SIGNIFICANT CHANGE UP
POLYCHROMASIA BLD QL SMEAR: SLIGHT — SIGNIFICANT CHANGE UP
POLYCHROMASIA BLD QL SMEAR: SLIGHT — SIGNIFICANT CHANGE UP
POTASSIUM SERPL-MCNC: 3.8 MMOL/L — SIGNIFICANT CHANGE UP (ref 3.5–5.3)
POTASSIUM SERPL-MCNC: 3.8 MMOL/L — SIGNIFICANT CHANGE UP (ref 3.5–5.3)
POTASSIUM SERPL-SCNC: 3.8 MMOL/L — SIGNIFICANT CHANGE UP (ref 3.5–5.3)
POTASSIUM SERPL-SCNC: 3.8 MMOL/L — SIGNIFICANT CHANGE UP (ref 3.5–5.3)
PROT SERPL-MCNC: 6.5 G/DL — SIGNIFICANT CHANGE UP (ref 6–8.3)
PROT SERPL-MCNC: 6.5 G/DL — SIGNIFICANT CHANGE UP (ref 6–8.3)
PROTHROM AB SERPL-ACNC: 11.9 SEC — SIGNIFICANT CHANGE UP (ref 9.5–13)
PROTHROM AB SERPL-ACNC: 11.9 SEC — SIGNIFICANT CHANGE UP (ref 9.5–13)
RBC # BLD: 4.33 M/UL — SIGNIFICANT CHANGE UP (ref 3.8–5.2)
RBC # BLD: 4.33 M/UL — SIGNIFICANT CHANGE UP (ref 3.8–5.2)
RBC # FLD: 13.2 % — SIGNIFICANT CHANGE UP (ref 10.3–14.5)
RBC # FLD: 13.2 % — SIGNIFICANT CHANGE UP (ref 10.3–14.5)
RBC BLD AUTO: ABNORMAL
RBC BLD AUTO: ABNORMAL
SMUDGE CELLS # BLD: PRESENT — SIGNIFICANT CHANGE UP
SMUDGE CELLS # BLD: PRESENT — SIGNIFICANT CHANGE UP
SODIUM SERPL-SCNC: 139 MMOL/L — SIGNIFICANT CHANGE UP (ref 135–145)
SODIUM SERPL-SCNC: 139 MMOL/L — SIGNIFICANT CHANGE UP (ref 135–145)
TROPONIN T, HIGH SENSITIVITY RESULT: <6 NG/L — SIGNIFICANT CHANGE UP
TROPONIN T, HIGH SENSITIVITY RESULT: <6 NG/L — SIGNIFICANT CHANGE UP
VARIANT LYMPHS # BLD: 2.7 % — SIGNIFICANT CHANGE UP (ref 0–6)
VARIANT LYMPHS # BLD: 2.7 % — SIGNIFICANT CHANGE UP (ref 0–6)
WBC # BLD: 2.53 K/UL — LOW (ref 3.8–10.5)
WBC # BLD: 2.53 K/UL — LOW (ref 3.8–10.5)
WBC # FLD AUTO: 2.53 K/UL — LOW (ref 3.8–10.5)
WBC # FLD AUTO: 2.53 K/UL — LOW (ref 3.8–10.5)

## 2023-11-24 PROCEDURE — 71045 X-RAY EXAM CHEST 1 VIEW: CPT | Mod: 26

## 2023-11-24 PROCEDURE — 99285 EMERGENCY DEPT VISIT HI MDM: CPT

## 2023-11-24 PROCEDURE — 93010 ELECTROCARDIOGRAM REPORT: CPT

## 2023-11-24 NOTE — ED ADULT NURSE NOTE - OBJECTIVE STATEMENT
Patient arrived from home c/o chest pain starting this morning. Breahting non-labored. Right 20 AC. Spencer PHX-. As per EMS, patient is COVID Positive. Patient transferred from Urgent care. Breathing non-labored. Labs sent. XR done. Right 20G IVL via EMS in place and tolerating well.

## 2023-11-24 NOTE — ED PROVIDER NOTE - OBJECTIVE STATEMENT
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  Patient went to urgent care today was diagnosed with COVID.  Reports headache/fevers since Tuesday but also reports constant chest tightness.  Midsternal, nonradiating, nonexertional with no associated LOC, palpitations, diaphoresis, focal weakness, numbness/tingling.  Not associated with breathing.  Denies any leg swelling/pain, hemoptysis, OCP use, recent travel/bedbound nature, or history of blood clots.  No N/V/D, cough, rashes.

## 2023-11-24 NOTE — ED PROVIDER NOTE - PATIENT PORTAL LINK FT
You can access the FollowMyHealth Patient Portal offered by [facility] by registering at the following website: [email or web address]. By joining [employer]’s FollowMyHealth portal, you will also be able to view your health information using other applications (apps) compatible with our system.

## 2023-11-24 NOTE — ED PROVIDER NOTE - PHYSICAL EXAMINATION
Gen: WDWN, NAD, comfortable appearing, hemodynamically stable   HEENT: No nasal discharge, mucous membranes moist  CV: RRR, +S1/S2, no M/R/G, equal b/l radial pulses 2+  Resp: CTAB, no W/R/R, SPO2 >95% on RA, no increased WOB   GI: Abdomen soft non-distended, NTTP, no masses/organomegaly   MSK/Skin: No CVA tenderness, no open wounds, no bruising, no LE edema/calf tenderness   Neuro: A&Ox4, moving all 4 extremities spontaneously  Psych: appropriate mood

## 2023-11-24 NOTE — ED PROVIDER NOTE - NSFOLLOWUPINSTRUCTIONS_ED_ALL_ED_FT
Please follow up with your PCP as soon as possible to redraw your labs to monitor your white blood cell count.     SEEK IMMEDIATE MEDICAL CARE IF YOU OR YOUR CHILD HAVE ANY OF THE FOLLOWING SYMPTOMS : Shortness of breath, seizure, rash/stiff neck/headache, severe abdominal pain, persistent vomiting, any signs of dehydration, or persistent fever uncontrolled by tylenol/motrin, or any new/concerning symptoms     Although the testing done today indicates that your chest pain is not from an acute emergency, your pain could still represent a problem with your heart. You need to follow up with your doctor and/or a cardiologist in the next 48-72 hours. If you develop any new or worsening symptoms you need to return immediately to the emergency department. If you experience any of the following please come right back to the emergency room: chest pain that becomes much worse with walking up stairs or exercising, uncontrollable nausea and vomiting, severe chest pain that will not go away, passing out, new persistent numbness and/or weakness. If we discussed that this pain was likely due to a muscle strain or sprain you should take over the counter medications such as Tylenol. You should avoid taking medications such as ibuprofen, Motrin, Advil or other NSAIDs until you speak with your doctor about your pain.

## 2023-11-24 NOTE — ED ADULT NURSE NOTE - AS PAIN REST
5 (moderate pain) Hypertriglyceridemia Monitoring: I explained this is common when taking isotretinoin. We will monitor closely.

## 2023-11-24 NOTE — ED ADULT TRIAGE NOTE - CHIEF COMPLAINT QUOTE
Patient arrived from home c/o chest pain starting this morning. Breahting non-labored. Right 20 AC. Spencer PHX-. As per EMS, patient is COVID Positive.

## 2023-11-24 NOTE — ED PROVIDER NOTE - PROGRESS NOTE DETAILS
Skyler, Attending  D-dimer and trop non-actionable. Low WBC count noted on CBC. Spoke briefly with hematology about these incidental findings who reviewed diff and report neutrophil count of 1.05 is reassuring on differential even though ANC on CBC says 0.73. Report that in setting of COVID infection not abnormal and less suspicious for malignant process but patient needs to follow up to monitor levels. Patient informed of results and would like to go home and HIV lab drawn. Spoke about signs of symptoms of worsening infection and malignancy. Dced with strict return precautions and PCP f/u.

## 2023-11-24 NOTE — ED PROVIDER NOTE - CLINICAL SUMMARY MEDICAL DECISION MAKING FREE TEXT BOX
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  Exam/history concerning for but not limited to viral syndrome versus pneumonia versus ACS or suspicion for PE.  EKG nonischemic.  Will evaluate with basic labs, cardiac enzymes, D-dimer, chest x-ray and reassess.  Patient agreeable to CTA chest if D-dimer elevated and reports has had CTA with IV contrast in past.  Will check kidney function prior to CT scan

## 2023-11-25 ENCOUNTER — NON-APPOINTMENT (OUTPATIENT)
Age: 38
End: 2023-11-25

## 2023-11-30 ENCOUNTER — NON-APPOINTMENT (OUTPATIENT)
Age: 38
End: 2023-11-30

## 2023-12-01 ENCOUNTER — APPOINTMENT (OUTPATIENT)
Dept: OBGYN | Facility: CLINIC | Age: 38
End: 2023-12-01

## 2023-12-03 ENCOUNTER — NON-APPOINTMENT (OUTPATIENT)
Age: 38
End: 2023-12-03

## 2023-12-15 ENCOUNTER — NON-APPOINTMENT (OUTPATIENT)
Age: 38
End: 2023-12-15

## 2023-12-21 NOTE — H&P PST ADULT - BREASTS COMMENTS
Physical Therapy Discharge    Visit Type: Discharge Summary- Daily Treatment Note  Visit: 6  Referring Provider: Jordan Patrick MD  Medical Diagnosis (from order): M25.512, G89.29 - Chronic left shoulder pain     SUBJECTIVE                                                                                                               Patient states his shoulder is at least 85% improved and he feels comfortable progressing on his own, will keep doing his exercises and is confident he will continue to improve.      OBJECTIVE                                                                                                                     Range of Motion (ROM)   (degrees unless noted; active unless noted; norms in ( ); negative=lacking to 0, positive=beyond 0)  WNL: LUE, RUE    Strength  (out of 5 unless noted, standard test position unless noted)   5/5: LUE, RUE                    Outcome/Assessments  Outcome Measures:   Quick Disabilities of the Arm, Shoulder and Hand: QuickDash Total Score (Score will not calculate if more then 2 questions are left blank): 2.27  (scored 0-100; a higher score indicates greater disability) see flowsheet for additional documentation          Treatment     Therapeutic Exercise  Patient educated in symptom monitoring with all exercises   See below for HEP revised, updated and reissued today, written instructions with individualized recommendations provided to patient.  Reviewed his program strengthening with multiple options, patient will try at his gym and report back:  *chest press on bench with light bar or light free weights  *TRX rows  *chest press machine    All stretching reviewed and added modified positions patient can do at gym    Skilled input: verbal instruction/cues, as detailed above, tactile instruction/cues, facilitation and posture correction    Writer verbally educated and received verbal consent for hand placement, positioning of patient, and techniques to be  performed today from patient for clothing adjustments for techniques, hand placement and palpation for techniques and therapist position for techniques as described above and how they are pertinent to the patient's plan of care.  Home Exercise Program  *above indicates provided as part of home exercise program      ASSESSMENT                                                                                                            Patient has met all LTGs and is independent with HEP.  Pain/symptoms after session (out of 10): 0  Education:   - Results of above outlined education: Verbalizes understanding and Demonstrates understanding    PLAN                                                                                                                           Discharge from skilled therapy with instructions/recommendations to: Discharge from skilled therapy with instructions/recommendations to, continue home exercise program    Suggestions for next session as indicated: Recommend discharge from physical therapy, continue with HEP and full activity..       Therapy procedure time and total treatment time can be found documented on the Time Entry flowsheet     hypertrophy of breast , shoulder pain

## 2024-01-15 NOTE — OB RN TRIAGE NOTE - ABORTIONS, OB PROFILE
[facility]  Paulo Mccallum MD  [phone]      Aleja Silverio  2000    23 y.o. female     Date of Visit: 1/15/2024    Chief Complaint: acne, rash    History of Present Illness:    1.  She returns today to follow-up for chronic adult female acne.  She reports that it has worsened in the last 8 months.  She tried stopping her Epiduo forte and switching to an over-the-counter topical medicine without improvement.  She went off her birth control pill and now has the Mirena IUD.    2.  She also has a history of atopic dermatitis which typically responds well to use of triamcinolone 0.1% cream.  She complains of some persistent areas of involvement on the neck and nipples.      Review of Systems:  Gen: Feels well, good sense of health.      Past Medical History, Family History, Surgical History, Medications and Allergies reviewed.    History reviewed. No pertinent past medical history.  Past Surgical History:   Procedure Laterality Date    APPENDECTOMY         No Known Allergies  Outpatient Medications Marked as Taking for the 1/15/24 encounter (Office Visit) with Paulo Mccallum MD   Medication Sig Dispense Refill    levonorgestrel (MIRENA, 52 MG,) IUD 52 mg 1 Intra Uterine Device by IntraUTERine route once      tacrolimus (PROTOPIC) 0.1 % ointment Apply to affected areas twice daily until improved. 30 g 4    spironolactone (ALDACTONE) 50 MG tablet Take 1 tablet by mouth 2 times daily 180 tablet 1    Adapalene-Benzoyl Peroxide (EPIDUO FORTE) 0.3-2.5 % GEL Apply to the face daily for acne. 45 g 0    triamcinolone (KENALOG) 0.1 % cream Apply to affected areas on the extremities twice daily for up to 2 weeks or until improved. 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        Physical Examination       Well appearing.    1.  Forehead, cheeks, upper back with several 
0

## 2024-01-30 NOTE — H&P PST ADULT - NSANTHOSAYNRD_GEN_A_CORE
with patient No. AMPARO screening performed.  STOP BANG Legend: 0-2 = LOW Risk; 3-4 = INTERMEDIATE Risk; 5-8 = HIGH Risk

## 2024-03-19 ENCOUNTER — NON-APPOINTMENT (OUTPATIENT)
Age: 39
End: 2024-03-19

## 2024-03-25 ENCOUNTER — APPOINTMENT (OUTPATIENT)
Dept: OBGYN | Facility: CLINIC | Age: 39
End: 2024-03-25

## 2024-04-17 NOTE — ED PROVIDER NOTE - PRO INTERPRETER NEED 2
----- Message from Raven Becerra MD sent at 4/17/2024  7:42 AM CDT -----  Clinically normal , await regional screen    English

## 2024-05-05 NOTE — H&P PST ADULT - NSANTHOSAYNRD_GEN_A_CORE
No. AMPARO screening performed.  STOP BANG Legend: 0-2 = LOW Risk; 3-4 = INTERMEDIATE Risk; 5-8 = HIGH Risk jan 2023

## 2024-05-13 ENCOUNTER — NON-APPOINTMENT (OUTPATIENT)
Age: 39
End: 2024-05-13

## 2024-05-21 ENCOUNTER — APPOINTMENT (OUTPATIENT)
Dept: PULMONOLOGY | Facility: CLINIC | Age: 39
End: 2024-05-21
Payer: COMMERCIAL

## 2024-05-21 ENCOUNTER — TRANSCRIPTION ENCOUNTER (OUTPATIENT)
Age: 39
End: 2024-05-21

## 2024-05-21 VITALS
HEART RATE: 81 BPM | DIASTOLIC BLOOD PRESSURE: 72 MMHG | OXYGEN SATURATION: 98 % | BODY MASS INDEX: 25.11 KG/M2 | HEIGHT: 67 IN | WEIGHT: 160 LBS | SYSTOLIC BLOOD PRESSURE: 109 MMHG

## 2024-05-21 DIAGNOSIS — U07.1 COVID-19: ICD-10-CM

## 2024-05-21 LAB
BASOPHILS # BLD AUTO: 0.02 K/UL
BASOPHILS NFR BLD AUTO: 0.4 %
EOSINOPHIL # BLD AUTO: 0.04 K/UL
EOSINOPHIL NFR BLD AUTO: 0.7 %
HCT VFR BLD CALC: 42.1 %
HGB BLD-MCNC: 14.4 G/DL
IMM GRANULOCYTES NFR BLD AUTO: 0.2 %
LYMPHOCYTES # BLD AUTO: 1.92 K/UL
LYMPHOCYTES NFR BLD AUTO: 34.2 %
MAN DIFF?: NORMAL
MCHC RBC-ENTMCNC: 30.3 PG
MCHC RBC-ENTMCNC: 34.2 GM/DL
MCV RBC AUTO: 88.6 FL
MONOCYTES # BLD AUTO: 0.31 K/UL
MONOCYTES NFR BLD AUTO: 5.5 %
NEUTROPHILS # BLD AUTO: 3.32 K/UL
NEUTROPHILS NFR BLD AUTO: 59 %
PLATELET # BLD AUTO: 252 K/UL
RBC # BLD: 4.75 M/UL
RBC # FLD: 13.1 %
WBC # FLD AUTO: 5.62 K/UL

## 2024-05-21 PROCEDURE — 36415 COLL VENOUS BLD VENIPUNCTURE: CPT

## 2024-05-21 PROCEDURE — 99203 OFFICE O/P NEW LOW 30 MIN: CPT

## 2024-05-21 PROCEDURE — 71046 X-RAY EXAM CHEST 2 VIEWS: CPT

## 2024-05-22 LAB
ALBUMIN SERPL ELPH-MCNC: 4.3 G/DL
ALP BLD-CCNC: 77 U/L
ALT SERPL-CCNC: 7 U/L
ANION GAP SERPL CALC-SCNC: 12 MMOL/L
AST SERPL-CCNC: 11 U/L
BILIRUB SERPL-MCNC: 0.3 MG/DL
BUN SERPL-MCNC: 14 MG/DL
CALCIUM SERPL-MCNC: 9.7 MG/DL
CHLORIDE SERPL-SCNC: 103 MMOL/L
CO2 SERPL-SCNC: 25 MMOL/L
COVID-19 NUCLEOCAPSID  GAM ANTIBODY INTERPRETATION: POSITIVE
COVID-19 SPIKE DOMAIN ANTIBODY INTERPRETATION: POSITIVE
CREAT SERPL-MCNC: 1.16 MG/DL
CRP SERPL-MCNC: <3 MG/L
EGFR: 62 ML/MIN/1.73M2
FERRITIN SERPL-MCNC: 16 NG/ML
GLUCOSE SERPL-MCNC: 76 MG/DL
POTASSIUM SERPL-SCNC: 4.4 MMOL/L
PROT SERPL-MCNC: 7.3 G/DL
SARS-COV-2 AB SERPL IA-ACNC: >250 U/ML
SARS-COV-2 AB SERPL QL IA: 123 INDEX
SODIUM SERPL-SCNC: 139 MMOL/L

## 2024-05-22 NOTE — ASSESSMENT
[FreeTextEntry1] : HUSEYIN PATTERSON is a 38 year old female, with history of lipodystrophy, recovering from COVID. Most likely will recover spontaneously without further intervention. Will check COVID inflammatory markers and if these are elevated we will recommend second course of steroids. Counseled patient that recovery from COVID is often not linear and may take a full month for the patient to recover to baseline

## 2024-05-22 NOTE — ADDENDUM
Progress Notes by Renetta Sosa MD at 11/10/2017 11:10 AM     Author: Renetta Sosa MD Service: -- Author Type: Physician    Filed: 11/10/2017 11:23 AM Encounter Date: 11/10/2017 Status: Signed    : Renetta Sosa MD (Physician)           Click to link to [facility] HEART CARE NOTE    Thank you, Dr. Guaman, for asking the [facility] team to see Ms. Ayanna Argueta to follow-up on aortic valve replacement and permanent pacemaker insertion.    Assessment/Recommendations   Assessment:    1.  Aortic valve stenosis, status post TAVR in October 2016.  Recent echocardiogram continues to show low gradient across her bioprosthetic aortic valve with no evidence of insufficiency.  Continued monitoring.  2.  Persistent atrial fibrillation with slow ventricular response, status post permanent pacemaker insertion following her aortic valve replacement.  Recent device check demonstrates no issues.  She does continue to report dyspnea on exertion which improved slightly with a decreased dose in her metoprolol.  I suggested we have her evaluated in our pacemaker clinic to see if any adjustment in her rate adaptive setting might improve her symptoms.  3.  Essential hypertension, well controlled  4.  Moderate mitral and tricuspid regurgitation, unchanged.  No evidence of pulmonary hypertension on her current echo as a potential cause of her continued exertional dyspnea    Plan:  1.  Decrease metoprolol succinate to 50 mg daily  2.  Continue all other medications as his  3.  Follow-up in pacemaker clinic in the near future and with me in 1 year       History of Present Illness    Ms. Ayanna Argueta is a 87 y.o. female with history of aortic valve stenosis, status post TAVR in October 2016, persistent atrial fibrillation status post permanent pacemaker insertion following her valve procedure because of slow ventricular response, essential hypertension and moderate mitral and tricuspid regurgitation  [FreeTextEntry1] : I, Sarah Gore, acted solely as a scribe for Dr. Clifton Cervantes M.D. on this date 05/21/2024.   All medical record entries made by the Scribe were at my, Dr. Clifton Cervantes M.D., direction and personally dictated by me on 05/21/2024. I have reviewed the chart and agree that the record accurately reflects my personal performance of the history, physical exam, assessment and plan. I have also personally directed, reviewed, and agreed with the chart. who presents today for routine follow-up visit.  She did note some improvement in her symptoms of exertional dyspnea with a decreased dose of metoprolol but does continue to find this as a limiting symptom.  She had only mild nonocclusive coronary artery disease at the time of her pre-aortic valve angiogram so this is not the cause.  Her blood pressures have remained well controlled.  She denies any lightheadedness or near syncope.  No evidence of high ventricular rates on her recent device check    ECG (personally reviewed): No ECG today    Cardiac Imaging Studies (personally reviewed): Recent echocardiogram demonstrates normal LV systolic function without wall motion abnormality, normal function of a bioprosthetic aortic valve, moderate mitral and tricuspid regurgitation with no evidence of pulmonary hypertension.  This is essentially unchanged from her last echo cardiogram at the [facility]     Physical Examination Review of Systems   Vitals:    11/10/17 1050   BP: 120/70   Pulse: 64   Resp: 18     Body mass index is 26.43 kg/(m^2).  Wt Readings from Last 3 Encounters:   11/10/17 154 lb (69.9 kg)   11/07/17 153 lb (69.4 kg)   06/06/17 153 lb (69.4 kg)     General Appearance:   Awake, Alert, No acute distress.   HEENT:  No scleral icterus; the mucous membranes were pink and moist.   Neck: No cervical bruits or jugular venous distention    Chest: The spine was straight. The chest was symmetric.   Lungs:   Respirations unlabored; the lungs are clear to auscultation. No wheezing   Cardiovascular:    Regular rate and rhythm.  S1, S2 normal.  2/6 holosystolic murmur is heard at the apex no peripheral edema bilaterally   Abdomen:  No organomegaly, masses, bruits, or tenderness. Bowels sounds are present   Extremities:  No peripheral edema bilaterally   Skin: No xanthelasma. Warm, Dry.   Musculoskeletal: No tenderness.   Neurologic: Mood and affect are appropriate.    General: WNL  Eyes: WNL  Ears/Nose/Throat:  WNL  Lungs: Snoring  Heart: WNL  Stomach: WNL  Bladder: WNL  Muscle/Joints: WNL  Skin: WNL  Nervous System: WNL  Mental Health: WNL     Blood: WNL     Medical History  Surgical History Family History Social History   -Aortic valve stenosis  -Permanent atrial fibrillation  -Essential hypertension  -Moderate mitral and tricuspid regurgitation S/p TAVR  S/p PPM No family history of early coronary artery disease Social History     Social History   ? Marital status:      Spouse name: N/A   ? Number of children: N/A   ? Years of education: N/A     Occupational History   ? Not on file.     Social History Main Topics   ? Smoking status: Never Smoker   ? Smokeless tobacco: Not on file   ? Alcohol use Not on file   ? Drug use: Not on file   ? Sexual activity: Not on file     Other Topics Concern   ? Not on file     Social History Narrative          Medications  Allergies   Current Outpatient Prescriptions   Medication Sig Dispense Refill   ? aspirin 81 MG EC tablet Take 81 mg by mouth daily.     ? calcium carbonate (OS-JIGAR) 600 mg (1,500 mg) tablet Take 600 mg by mouth 2 (two) times a day with meals.     ? diphenhydrAMINE (BENADRYL) 25 mg capsule Take 25 mg by mouth daily.     ? furosemide (LASIX) 40 MG tablet Take 1.5 tablets (60 mg total) by mouth daily. 135 tablet 3   ? Lactobacillus rhamnosus GG (CULTURELLE) 10-15 Billion cell capsule Take 1 capsule by mouth daily.     ? levothyroxine (SYNTHROID, LEVOTHROID) 150 MCG tablet Take 150 mcg by mouth daily.     ? losartan (COZAAR) 100 MG tablet Take 1 tablet (100 mg total) by mouth daily. 90 tablet 3   ? magnesium oxide (MAG-OX) 400 mg tablet Take 400 mg by mouth daily.     ? melatonin 3 mg Tab tablet Take 5 mg by mouth bedtime as needed.     ? metoprolol succinate (TOPROL-XL) 50 MG 24 hr tablet Take 1 tablet (50 mg total) by mouth daily. 90 tablet 3   ? multivitamin with minerals (THERA-M) 9 mg iron-400 mcg Tab tablet Take 1 tablet by mouth daily.     ? naproxen sodium  (ALEVE) 220 MG tablet Take 220 mg by mouth as needed for pain.     ? potassium chloride (MICRO-K) 10 mEq CR capsule Take 10 mEq by mouth daily.      ? simvastatin (ZOCOR) 40 MG tablet Take 1 tablet (40 mg total) by mouth at bedtime. 90 tablet 3   ? warfarin (COUMADIN) 2 MG tablet Take 1 tablet (2 mg total) by mouth daily. Take 1 tablet (2 mg) by mouth daily. Adjust dose based on INR results as directed. 90 tablet 3     No current facility-administered medications for this visit.       Allergies   Allergen Reactions   ? Cardizem [Diltiazem Hcl]      Stomach upset   ? Excedrin Extra Strength [Aspirin-Acetaminophen-Caffeine]      ulcer   ? Gabapentin    ? Hydrocodone-Acetaminophen      stomach upset   ? Sulfa (Sulfonamide Antibiotics)    ? Trees          Lab Results    Chemistry/lipid CBC Cardiac Enzymes/BNP/TSH/INR   Lab Results   Component Value Date    CHOL 160 12/08/2016    HDL 43 (L) 12/08/2016    LDLCALC 62 12/08/2016    TRIG 274 (H) 12/08/2016    CREATININE 1.29 (H) 05/10/2017    BUN 25 05/10/2017    K 4.3 05/10/2017     05/10/2017     05/10/2017    CO2 29 05/10/2017    No results found for: WBC, HGB, HCT, MCV, PLT Lab Results   Component Value Date    TSH 2.80 12/08/2016    INR 2.03 (H) 06/26/2009

## 2024-05-22 NOTE — HISTORY OF PRESENT ILLNESS
[Never] : never [TextBox_4] : HUSEYIN PATTERSON is a 38 year old female, with history of lipodystrophy, who presents to the office for initial evaluation. The patient reports she was not feeling well since the past 2 weeks and visited urgent care. She was diagnosed with covid two weeks ago, had low oxygen saturation and experienced dyspnea so the urgent care prescribed her Paxlovid and prednisone.  Patient still does not feel back to baseline with some degree of exertional shortness of breath and fatigue no fever or chills reported.  Reports no prior pulmonary history

## 2024-06-16 ENCOUNTER — EMERGENCY (EMERGENCY)
Facility: HOSPITAL | Age: 39
LOS: 1 days | Discharge: HOME | End: 2024-06-16
Attending: EMERGENCY MEDICINE | Admitting: EMERGENCY MEDICINE
Payer: COMMERCIAL

## 2024-06-16 VITALS
TEMPERATURE: 99 F | SYSTOLIC BLOOD PRESSURE: 109 MMHG | DIASTOLIC BLOOD PRESSURE: 74 MMHG | HEART RATE: 91 BPM | HEIGHT: 66 IN | RESPIRATION RATE: 20 BRPM | WEIGHT: 160.06 LBS | OXYGEN SATURATION: 98 %

## 2024-06-16 DIAGNOSIS — Z98.890 OTHER SPECIFIED POSTPROCEDURAL STATES: Chronic | ICD-10-CM

## 2024-06-16 DIAGNOSIS — Z90.49 ACQUIRED ABSENCE OF OTHER SPECIFIED PARTS OF DIGESTIVE TRACT: Chronic | ICD-10-CM

## 2024-06-16 DIAGNOSIS — Z98.89 OTHER SPECIFIED POSTPROCEDURAL STATES: Chronic | ICD-10-CM

## 2024-06-16 DIAGNOSIS — Z90.5 ACQUIRED ABSENCE OF KIDNEY: Chronic | ICD-10-CM

## 2024-06-16 LAB
ALBUMIN SERPL ELPH-MCNC: 3.9 G/DL — SIGNIFICANT CHANGE UP (ref 3.3–5)
ALP SERPL-CCNC: 74 U/L — SIGNIFICANT CHANGE UP (ref 40–120)
ALT FLD-CCNC: 10 U/L — SIGNIFICANT CHANGE UP (ref 4–33)
ANION GAP SERPL CALC-SCNC: 10 MMOL/L — SIGNIFICANT CHANGE UP (ref 7–14)
APTT BLD: 32.8 SEC — SIGNIFICANT CHANGE UP (ref 24.5–35.6)
AST SERPL-CCNC: 11 U/L — SIGNIFICANT CHANGE UP (ref 4–32)
BASOPHILS # BLD AUTO: 0.03 K/UL — SIGNIFICANT CHANGE UP (ref 0–0.2)
BASOPHILS NFR BLD AUTO: 0.5 % — SIGNIFICANT CHANGE UP (ref 0–2)
BILIRUB SERPL-MCNC: <0.2 MG/DL — SIGNIFICANT CHANGE UP (ref 0.2–1.2)
BUN SERPL-MCNC: 16 MG/DL — SIGNIFICANT CHANGE UP (ref 7–23)
CALCIUM SERPL-MCNC: 9.1 MG/DL — SIGNIFICANT CHANGE UP (ref 8.4–10.5)
CHLORIDE SERPL-SCNC: 102 MMOL/L — SIGNIFICANT CHANGE UP (ref 98–107)
CO2 SERPL-SCNC: 26 MMOL/L — SIGNIFICANT CHANGE UP (ref 22–31)
CREAT SERPL-MCNC: 1.08 MG/DL — SIGNIFICANT CHANGE UP (ref 0.5–1.3)
EGFR: 68 ML/MIN/1.73M2 — SIGNIFICANT CHANGE UP
EOSINOPHIL # BLD AUTO: 0.08 K/UL — SIGNIFICANT CHANGE UP (ref 0–0.5)
EOSINOPHIL NFR BLD AUTO: 1.3 % — SIGNIFICANT CHANGE UP (ref 0–6)
GLUCOSE SERPL-MCNC: 78 MG/DL — SIGNIFICANT CHANGE UP (ref 70–99)
HCG SERPL-ACNC: <1 MIU/ML — SIGNIFICANT CHANGE UP
HCT VFR BLD CALC: 40.2 % — SIGNIFICANT CHANGE UP (ref 34.5–45)
HGB BLD-MCNC: 13.2 G/DL — SIGNIFICANT CHANGE UP (ref 11.5–15.5)
IANC: 3.61 K/UL — SIGNIFICANT CHANGE UP (ref 1.8–7.4)
IMM GRANULOCYTES NFR BLD AUTO: 0.3 % — SIGNIFICANT CHANGE UP (ref 0–0.9)
INR BLD: 1.04 RATIO — SIGNIFICANT CHANGE UP (ref 0.85–1.18)
LYMPHOCYTES # BLD AUTO: 2.12 K/UL — SIGNIFICANT CHANGE UP (ref 1–3.3)
LYMPHOCYTES # BLD AUTO: 33.8 % — SIGNIFICANT CHANGE UP (ref 13–44)
MCHC RBC-ENTMCNC: 29 PG — SIGNIFICANT CHANGE UP (ref 27–34)
MCHC RBC-ENTMCNC: 32.8 GM/DL — SIGNIFICANT CHANGE UP (ref 32–36)
MCV RBC AUTO: 88.4 FL — SIGNIFICANT CHANGE UP (ref 80–100)
MONOCYTES # BLD AUTO: 0.42 K/UL — SIGNIFICANT CHANGE UP (ref 0–0.9)
MONOCYTES NFR BLD AUTO: 6.7 % — SIGNIFICANT CHANGE UP (ref 2–14)
NEUTROPHILS # BLD AUTO: 3.61 K/UL — SIGNIFICANT CHANGE UP (ref 1.8–7.4)
NEUTROPHILS NFR BLD AUTO: 57.4 % — SIGNIFICANT CHANGE UP (ref 43–77)
NRBC # BLD: 0 /100 WBCS — SIGNIFICANT CHANGE UP (ref 0–0)
NRBC # FLD: 0 K/UL — SIGNIFICANT CHANGE UP (ref 0–0)
PLATELET # BLD AUTO: 230 K/UL — SIGNIFICANT CHANGE UP (ref 150–400)
POTASSIUM SERPL-MCNC: 3.7 MMOL/L — SIGNIFICANT CHANGE UP (ref 3.5–5.3)
POTASSIUM SERPL-SCNC: 3.7 MMOL/L — SIGNIFICANT CHANGE UP (ref 3.5–5.3)
PROT SERPL-MCNC: 7.1 G/DL — SIGNIFICANT CHANGE UP (ref 6–8.3)
PROTHROM AB SERPL-ACNC: 11.6 SEC — SIGNIFICANT CHANGE UP (ref 9.5–13)
RBC # BLD: 4.55 M/UL — SIGNIFICANT CHANGE UP (ref 3.8–5.2)
RBC # FLD: 12.5 % — SIGNIFICANT CHANGE UP (ref 10.3–14.5)
SODIUM SERPL-SCNC: 138 MMOL/L — SIGNIFICANT CHANGE UP (ref 135–145)
TROPONIN T, HIGH SENSITIVITY RESULT: <6 NG/L — SIGNIFICANT CHANGE UP
WBC # BLD: 6.28 K/UL — SIGNIFICANT CHANGE UP (ref 3.8–10.5)
WBC # FLD AUTO: 6.28 K/UL — SIGNIFICANT CHANGE UP (ref 3.8–10.5)

## 2024-06-16 PROCEDURE — 93010 ELECTROCARDIOGRAM REPORT: CPT

## 2024-06-16 PROCEDURE — 70450 CT HEAD/BRAIN W/O DYE: CPT | Mod: 26,MC

## 2024-06-16 PROCEDURE — 71275 CT ANGIOGRAPHY CHEST: CPT | Mod: 26,MC

## 2024-06-16 PROCEDURE — 99285 EMERGENCY DEPT VISIT HI MDM: CPT

## 2024-06-16 PROCEDURE — 74174 CTA ABD&PLVS W/CONTRAST: CPT | Mod: 26,MC

## 2024-06-16 RX ORDER — NIRMATRELVIR AND RITONAVIR 150-100 MG
1 KIT ORAL
Qty: 1 | Refills: 0
Start: 2024-06-16 | End: 2024-06-20

## 2024-06-16 RX ORDER — ACETAMINOPHEN 500 MG
650 TABLET ORAL ONCE
Refills: 0 | Status: COMPLETED | OUTPATIENT
Start: 2024-06-16 | End: 2024-06-16

## 2024-06-16 RX ORDER — NIRMATRELVIR AND RITONAVIR 150-100 MG
3 KIT ORAL
Qty: 15 | Refills: 0
Start: 2024-06-16 | End: 2024-06-20

## 2024-06-16 RX ORDER — SODIUM CHLORIDE 9 MG/ML
1000 INJECTION INTRAMUSCULAR; INTRAVENOUS; SUBCUTANEOUS ONCE
Refills: 0 | Status: COMPLETED | OUTPATIENT
Start: 2024-06-16 | End: 2024-06-16

## 2024-06-16 RX ADMIN — SODIUM CHLORIDE 1000 MILLILITER(S): 9 INJECTION INTRAMUSCULAR; INTRAVENOUS; SUBCUTANEOUS at 18:57

## 2024-06-16 RX ADMIN — Medication 650 MILLIGRAM(S): at 18:57

## 2024-06-16 NOTE — ED PROVIDER NOTE - OBJECTIVE STATEMENT
38-year-old female, PMHx significant for recently contracted COVID 2 months prior, PSHx significant for appendectomy, left kidney donation, presenting with COVID-positive status, frontal HA, generalized weakness. Patient states over the last 2 days feeling weak, with decreased p.o. intake. Daughter recently contracted and tested positive for COVID.  2 days ago patient reports after getting out of shower having a fainting episode. Today, after getting out of the shower had a second syncopal episode accompanied by non-radiating substernal CP lasting approximately 18 minutes described as "something sitting on chest" with diaphoresis. Both syncopal episodes were unwitnessed. Pt denies urinating on self after coming to. Otherwise, denies SOB, abd pain, dysuria, hematuria, changes in BM.

## 2024-06-16 NOTE — ED PROVIDER NOTE - PATIENT PORTAL LINK FT
You can access the FollowMyHealth Patient Portal offered by [facility] by registering at the following website: [email or web address]. By joining [facility]’s FollowMyHealth portal, you will also be able to view your health information using other applications (apps) compatible with our system.

## 2024-06-16 NOTE — ED ADULT TRIAGE NOTE - CHIEF COMPLAINT QUOTE
pt tested + COVID today due to severe HA, productive cough, fever, chills, pt stated her 2 yr old was sick and tested + COVID 2 wk ago.

## 2024-06-16 NOTE — ED PROVIDER NOTE - PROGRESS NOTE DETAILS
Catherine: Rec'd pt. as signout and feels better, improved HA and able to ambulate in ED. States did not get covid test in ED bc tested positive prior to arrival. Prev. attdg plan to offer Paxlovid. Pt. rec'd 2 doses of initial vaccine, amenable to this plan, will send to pharmacy. No hx of kidney dz.

## 2024-06-16 NOTE — ED PROVIDER NOTE - ATTENDING CONTRIBUTION TO CARE
davina 38F no pmhx h/o appx, L kidney donor, had covid 2 mos ago, having symtpoms since yest, passed out yesterday from standing, no tongue bite, no U incontinence.  Having SSCP a/w sweating, passed out as well.   Multiple family members testing pos for covid.  Took tylenol for HA.  VS wnl.  Normal exam.  Feeling weak, decr PO intake.  Given CP and LOC will check for dissection, although sx likely r/t poor PO intake in setting of viral infection.  Pt req CTH as well r/o ICH given syncope.  Rx pain meds, fluids, covid swab, reass. EKG SR at 62 no jung no std.  TWI v2 aVL.   qtc 397  VS:  unremarkable    GEN - NAD;   malaise;   A+O x3   HEAD - NC/AT     ENT - PEERL, EOMI, mucous membranes  dry , no discharge      NECK: Neck supple, non-tender without lymphadenopathy, no masses, no JVD  PULM - CTA b/l,  symmetric breath sounds  COR -  normal heart sounds    ABD - , ND, NT, soft,  BACK - no CVA tenderness, nontender spine     EXTREMS - no edema, no deformity, warm and well perfused    SKIN - no rash    or bruising      NEUROLOGIC - alert, face symmetric, speech fluent, sensation nl, motor no focal deficit.

## 2024-06-16 NOTE — ED PROVIDER NOTE - PHYSICAL EXAMINATION
GEN: Patient awake and alert. No acute distress, non-toxic.  Head: normocephalic, atraumatic.  Neck: Nontender, full ROM.   Eyes: PERRLA b/l. EOMI, no scleral icterus, no conjunctival injection. Moist mucous membranes.  ENT: Throat without exudates, uvula midline, no vesicles.   CARDIAC: RRR. Normal S1, S2. No murmur, rubs, or gallops. No peripheral edema noted.  PULM: CTA B/L no wheeze, rales or rhonchi. No signs of respiratory distress, no accessory muscle usage or nasal flaring.  ABD: Soft, nontender, nondistended. No rebound, no involuntary guarding.  : No CVA tenderness, no suprapubic tenderness.  MSK: Moving all extremities spontaneously. 5/5 strength and full ROM in all extremities. No obvious deformity.  NEURO: A&Ox3, no focal neurological deficits, CN 2-12 grossly intact. Gait normal.  SKIN: Warm, dry, no rash

## 2024-06-16 NOTE — ED PROVIDER NOTE - CLINICAL SUMMARY MEDICAL DECISION MAKING FREE TEXT BOX
38-year-old female, PMHx significant for recently contracted COVID 2 months prior, PSHx significant for appendectomy, left kidney donation, presenting with COVID-positive status, frontal HA, generalized weakness. Physical exam notable for a weak appearing individual, otherwise, no focal findings. CN II-XII intact, Normal strength and sensation throughout, heart: RRR, lungs: CTA.  Patient presented to ED with positive COVID status, other concerning differentials given reported chest pain and fainting spells include but not limited to ACS, arrhythmia, aortic dissection, electrolyte derangement. Will obtain CBC, CMP to evaluate for anemia, electrolyte derangement and infection. Given HA and unwitnessed fall, will obtain CT head to evaluate for possible acute intracranial bleeding. Otherwise, CT angio studies to r/o aortic dissection.

## 2024-06-16 NOTE — ED ADULT NURSE NOTE - OBJECTIVE STATEMENT
Patient A&o X4, received in intake , with complaints of cov pos/headache. Patient states, "I tested positive for covid yesterday and I have been having a really bad headache since". Patient also admits to passing out twice at home, admits to hitting head and LOC, denies taking any medication prior to arrival. Patient refusing covid swab at this time. Patient able to speak in clear sentences, respirations equal and unlabored. Lung sounds clear b/l, equal chest rise and fall noted. Denies CP/SOB, fever, chills, nausea, vomiting and diarrhea at this time. Skin warm and dry. Provider at bedside for eval, pending further orders.

## 2024-06-16 NOTE — ED ADULT NURSE NOTE - NSFALLRISKINTERV_ED_ALL_ED

## 2024-06-16 NOTE — ED PROVIDER NOTE - NSFOLLOWUPINSTRUCTIONS_ED_ALL_ED_FT
You were evaluated in the Emergency Dept. after passing out and complaining of headache and chest pain.  Your bloodwork, EKG, imaging and exam in the Emergency Dept. did not reveal a dangerous cause for your symptoms.  Drink plenty of fluids.  Fever and headache control:  Ibuprofen (Motrin or Advil) 400-600mg up to 3x per day, take with food.  Tylenol 1000mg up to 4x per day, take with food.  Return to Emergency for worsening pain, fever, weakness, nausea, vomiting, dizziness, fainting, shortness of breath or any signs of distress.

## 2024-06-17 NOTE — ED POST DISCHARGE NOTE - REASON FOR FOLLOW-UP
Jacek called stating got 2 Rxs for plaxovid . One with a renal dose. According to patient's history . Patient is a kidney donor therefore will have pharmacy give renal dose. Other

## 2024-06-20 ENCOUNTER — NON-APPOINTMENT (OUTPATIENT)
Age: 39
End: 2024-06-20

## 2024-07-15 ENCOUNTER — APPOINTMENT (OUTPATIENT)
Dept: OBGYN | Facility: CLINIC | Age: 39
End: 2024-07-15

## 2024-07-22 ENCOUNTER — APPOINTMENT (OUTPATIENT)
Dept: OBGYN | Facility: CLINIC | Age: 39
End: 2024-07-22

## 2024-07-22 PROCEDURE — 99213 OFFICE O/P EST LOW 20 MIN: CPT

## 2024-07-24 ENCOUNTER — APPOINTMENT (OUTPATIENT)
Dept: OBGYN | Facility: CLINIC | Age: 39
End: 2024-07-24
Payer: COMMERCIAL

## 2024-07-24 VITALS
HEIGHT: 67 IN | DIASTOLIC BLOOD PRESSURE: 78 MMHG | WEIGHT: 183 LBS | BODY MASS INDEX: 28.72 KG/M2 | SYSTOLIC BLOOD PRESSURE: 115 MMHG

## 2024-07-24 PROCEDURE — 0502F SUBSEQUENT PRENATAL CARE: CPT

## 2024-07-29 NOTE — HISTORY OF PRESENT ILLNESS
[PGHxTotal] : 5 [facility] 4 [facility] : 4 [FreeTextEntry1] :  x1, C/S x1,  x2

## 2024-07-29 NOTE — PLAN
[FreeTextEntry1] : 37 y/o female presents to office with amenorrhea  -pt to bring copies of labs done at [facility]  -pt to continue PNV  -RTO 2 weeks first trimester ultrasound and review of prenatal lab results

## 2024-07-29 NOTE — HISTORY OF PRESENT ILLNESS
[PGHxTotal] : 5 [[facility] 4 [facility] 4 [FreeTextEntry1] :  x1, C/S x1,  x2

## 2024-07-29 NOTE — PLAN
[FreeTextEntry1] : 39 y/o female presents to office with amenorrhea  -pt to bring copies of labs done at [facility]  -pt to continue PNV  -RTO 2 weeks first trimester ultrasound and review of prenatal lab results

## 2024-08-07 ENCOUNTER — APPOINTMENT (OUTPATIENT)
Dept: ANTEPARTUM | Facility: CLINIC | Age: 39
End: 2024-08-07

## 2024-08-07 ENCOUNTER — ASOB RESULT (OUTPATIENT)
Age: 39
End: 2024-08-07

## 2024-08-07 ENCOUNTER — APPOINTMENT (OUTPATIENT)
Dept: OBGYN | Facility: CLINIC | Age: 39
End: 2024-08-07

## 2024-08-07 PROCEDURE — 76801 OB US < 14 WKS SINGLE FETUS: CPT

## 2024-08-07 PROCEDURE — 0502F SUBSEQUENT PRENATAL CARE: CPT

## 2024-08-21 ENCOUNTER — APPOINTMENT (OUTPATIENT)
Dept: ANTEPARTUM | Facility: CLINIC | Age: 39
End: 2024-08-21
Payer: COMMERCIAL

## 2024-08-21 ENCOUNTER — ASOB RESULT (OUTPATIENT)
Age: 39
End: 2024-08-21

## 2024-08-21 ENCOUNTER — APPOINTMENT (OUTPATIENT)
Dept: OBGYN | Facility: CLINIC | Age: 39
End: 2024-08-21
Payer: COMMERCIAL

## 2024-08-21 VITALS
HEIGHT: 67 IN | DIASTOLIC BLOOD PRESSURE: 72 MMHG | SYSTOLIC BLOOD PRESSURE: 114 MMHG | BODY MASS INDEX: 28.88 KG/M2 | WEIGHT: 184 LBS

## 2024-08-21 DIAGNOSIS — Z34.90 ENCOUNTER FOR SUPERVISION OF NORMAL PREGNANCY, UNSPECIFIED, UNSPECIFIED TRIMESTER: ICD-10-CM

## 2024-08-21 PROCEDURE — 76813 OB US NUCHAL MEAS 1 GEST: CPT

## 2024-08-21 PROCEDURE — 76801 OB US < 14 WKS SINGLE FETUS: CPT

## 2024-08-21 PROCEDURE — 0502F SUBSEQUENT PRENATAL CARE: CPT

## 2024-08-25 LAB
ADDITIONAL US: NORMAL
CRL SCAN TWIN B: NORMAL
CRL SCAN: NORMAL
CROWN RUMP LENGTH TWIN B: NORMAL
CROWN RUMP LENGTH: 61.3 MM
DIA MOM: 1.5
DIA VALUE: 310 PG/ML
DOWN SYNDROME AGE RISK: NORMAL
DOWN SYNDROME INTERPRETATION: NORMAL
DOWN SYNDROME SCREENING RISK: NORMAL
FIRST TRIMESTER SCREEN COMMENTS: NORMAL
FIRST TRIMESTER SCREEN NOTE: NORMAL
FIRST TRIMESTER SCREEN RESULTS: NORMAL
FIRST TRIMESTER SCREEN TEST RESULTS: NORMAL
GEST. AGE ON COLLECTION DATE: 12.4 WEEKS
HCG MOM: 2.39
HCG VALUE: 184.6 IU/ML
MATERNAL AGE AT EDD: 38.5 YR
NT MOM TWIN B: NORMAL
NT TWIN B: NORMAL
NUCHAL TRANSLUCENCY (NT): 1.5 MM
NUCHAL TRANSLUCENCY MOM: 1.13
NUMBER OF FETUSES: 1
PAPP-A MOM: 1.93
PAPP-A VALUE: 1458.6 NG/ML
RACE: NORMAL
SONOGRAPHER ID#: NORMAL
TRISOMY 18 AGE RISK: NORMAL
TRISOMY 18 INTERPRETATION: NORMAL
TRISOMY 18 SCREENING RISK: NORMAL
WEIGHT AFP: 184 LBS

## 2024-09-01 LAB
CHROMOSOME13 INTERPRETATION: NORMAL
CHROMOSOME13 TEST RESULT: NORMAL
CHROMOSOME18 INTERPRETATION: NORMAL
CHROMOSOME18 TEST RESULT: NORMAL
CHROMOSOME21 INTERPRETATION: NORMAL
CHROMOSOME21 TEST RESULT: NORMAL
FETAL FRACTION: NORMAL
PERFORMANCE AND LIMITATIONS: NORMAL
SEX CHROMOSOME INTERPRETATION: NORMAL
SEX CHROMOSOME TEST RESULT: NORMAL
VERIFI PRENATAL TEST: NOT DETECTED

## 2024-09-03 NOTE — ED ADULT TRIAGE NOTE - CHIEF COMPLAINT QUOTE
Pt mother called regarding patients lab results. Mother verbally understands no further questions   RLQ pain and diarrhea 8 days, vomiting

## 2024-09-18 ENCOUNTER — NON-APPOINTMENT (OUTPATIENT)
Age: 39
End: 2024-09-18

## 2024-09-18 ENCOUNTER — APPOINTMENT (OUTPATIENT)
Dept: OBGYN | Facility: CLINIC | Age: 39
End: 2024-09-18
Payer: COMMERCIAL

## 2024-09-18 VITALS
DIASTOLIC BLOOD PRESSURE: 72 MMHG | WEIGHT: 188 LBS | HEIGHT: 67 IN | SYSTOLIC BLOOD PRESSURE: 110 MMHG | BODY MASS INDEX: 29.51 KG/M2

## 2024-09-18 DIAGNOSIS — Z3A.15 15 WEEKS GESTATION OF PREGNANCY: ICD-10-CM

## 2024-09-18 PROCEDURE — 0502F SUBSEQUENT PRENATAL CARE: CPT

## 2024-09-19 LAB
BASOPHILS # BLD AUTO: 0.02 K/UL
BASOPHILS NFR BLD AUTO: 0.2 %
EOSINOPHIL # BLD AUTO: 0.09 K/UL
EOSINOPHIL NFR BLD AUTO: 0.9 %
ESTIMATED AVERAGE GLUCOSE: 100 MG/DL
GLUCOSE SERPL-MCNC: 83 MG/DL
HBA1C MFR BLD HPLC: 5.1 %
HCT VFR BLD CALC: 35.1 %
HGB BLD-MCNC: 11.7 G/DL
HIV1+2 AB SPEC QL IA.RAPID: NONREACTIVE
IMM GRANULOCYTES NFR BLD AUTO: 0.3 %
LYMPHOCYTES # BLD AUTO: 1.87 K/UL
LYMPHOCYTES NFR BLD AUTO: 19.2 %
MAN DIFF?: NORMAL
MCHC RBC-ENTMCNC: 30.3 PG
MCHC RBC-ENTMCNC: 33.3 GM/DL
MCV RBC AUTO: 90.9 FL
MONOCYTES # BLD AUTO: 0.44 K/UL
MONOCYTES NFR BLD AUTO: 4.5 %
NEUTROPHILS # BLD AUTO: 7.29 K/UL
NEUTROPHILS NFR BLD AUTO: 74.9 %
PLATELET # BLD AUTO: 241 K/UL
RBC # BLD: 3.86 M/UL
RBC # FLD: 14.4 %
WBC # FLD AUTO: 9.74 K/UL

## 2024-09-22 LAB
ABO + RH PNL BLD: NORMAL
AFP ADJ MOM SERPL: 1.24
AFP CONCENTRATION: 30.68 NG/ML
AFP INTERPRETATION: NORMAL
AFP MOM CUT-OFF: 2.5
AFP PERCENTILE: 75.1
AFP SCREENING RESULT: NORMAL
AFTER SCREENING RISK OPEN SPINA BIFIDA: NORMAL
BEFORE SCREENING RISK OPEN SPINA BIFIDA: NORMAL
BLD GP AB SCN SERPL QL: NORMAL
CARBAMAZEPINE?: NO
CURRENT SMOKER: NORMAL
EXTREME ANALYTE ALERT: NO
FAMILY HISTORY OPEN SPINA BIFIDA: NORMAL
GA: NORMAL
GESTATIONAL AGE METHOD: NORMAL
HAV IGM SER QL: NONREACTIVE
HBV CORE IGM SER QL: NONREACTIVE
HBV SURFACE AG SER QL: NONREACTIVE
HBV SURFACE AG SER QL: NONREACTIVE
HCV AB SER QL: NONREACTIVE
HCV S/CO RATIO: 0.13 S/CO
IDDM PATIENT QL: NORMAL
LEAD BLD-MCNC: <1 UG/DL
M TB IFN-G BLD-IMP: NEGATIVE
MEV IGG FLD QL IA: 112 AU/ML
MEV IGG+IGM SER-IMP: POSITIVE
MULTIPLE PREGNANCY: NORMAL
MUV AB SER-ACNC: NEGATIVE
MUV IGG SER QL IA: <5 AU/ML
QUANTIFERON TB PLUS MITOGEN MINUS NIL: 6.99 IU/ML
QUANTIFERON TB PLUS NIL: 0.02 IU/ML
QUANTIFERON TB PLUS TB1 MINUS NIL: 0 IU/ML
QUANTIFERON TB PLUS TB2 MINUS NIL: 0 IU/ML
RUBV IGG FLD-ACNC: 1.18 INDEX
RUBV IGG SER-IMP: POSITIVE
T PALLIDUM AB SER QL IA: NEGATIVE
TEST PERFORMANCE INFO SPEC: NORMAL
VALPROIC ACID?: NORMAL

## 2024-10-16 ENCOUNTER — APPOINTMENT (OUTPATIENT)
Dept: OBGYN | Facility: CLINIC | Age: 39
End: 2024-10-16
Payer: COMMERCIAL

## 2024-10-16 ENCOUNTER — APPOINTMENT (OUTPATIENT)
Dept: ANTEPARTUM | Facility: CLINIC | Age: 39
End: 2024-10-16

## 2024-10-16 VITALS — WEIGHT: 195 LBS | BODY MASS INDEX: 30.54 KG/M2 | SYSTOLIC BLOOD PRESSURE: 125 MMHG | DIASTOLIC BLOOD PRESSURE: 77 MMHG

## 2024-10-16 PROCEDURE — 0502F SUBSEQUENT PRENATAL CARE: CPT

## 2024-10-16 PROCEDURE — 76811 OB US DETAILED SNGL FETUS: CPT

## 2024-10-16 PROCEDURE — 76817 TRANSVAGINAL US OBSTETRIC: CPT

## 2024-10-17 ENCOUNTER — APPOINTMENT (OUTPATIENT)
Dept: ANTEPARTUM | Facility: CLINIC | Age: 39
End: 2024-10-17

## 2024-10-17 PROCEDURE — 76816 OB US FOLLOW-UP PER FETUS: CPT

## 2024-10-23 ENCOUNTER — NON-APPOINTMENT (OUTPATIENT)
Age: 39
End: 2024-10-23

## 2024-10-24 ENCOUNTER — TRANSCRIPTION ENCOUNTER (OUTPATIENT)
Age: 39
End: 2024-10-24

## 2024-10-24 RX ORDER — NIRMATRELVIR AND RITONAVIR 300-100 MG
20 X 150 MG & KIT ORAL
Qty: 1 | Refills: 0 | Status: ACTIVE | COMMUNITY
Start: 2024-10-24 | End: 1900-01-01

## 2024-11-04 ENCOUNTER — TRANSCRIPTION ENCOUNTER (OUTPATIENT)
Age: 39
End: 2024-11-04

## 2024-11-11 ENCOUNTER — APPOINTMENT (OUTPATIENT)
Dept: OBGYN | Facility: CLINIC | Age: 39
End: 2024-11-11
Payer: COMMERCIAL

## 2024-11-11 VITALS — SYSTOLIC BLOOD PRESSURE: 111 MMHG | DIASTOLIC BLOOD PRESSURE: 71 MMHG | BODY MASS INDEX: 30.38 KG/M2 | WEIGHT: 194 LBS

## 2024-11-11 PROCEDURE — 0502F SUBSEQUENT PRENATAL CARE: CPT

## 2024-11-13 ENCOUNTER — LABORATORY RESULT (OUTPATIENT)
Age: 39
End: 2024-11-13

## 2024-11-13 ENCOUNTER — APPOINTMENT (OUTPATIENT)
Dept: OBGYN | Facility: CLINIC | Age: 39
End: 2024-11-13
Payer: COMMERCIAL

## 2024-11-13 PROCEDURE — 36415 COLL VENOUS BLD VENIPUNCTURE: CPT

## 2024-11-14 LAB — GLUCOSE 1H P 50 G GLC PO SERPL-MCNC: 98 MG/DL

## 2024-11-16 LAB
BASOPHILS # BLD AUTO: 0.03 K/UL
BASOPHILS NFR BLD AUTO: 0.3 %
EOSINOPHIL # BLD AUTO: 0.07 K/UL
EOSINOPHIL NFR BLD AUTO: 0.8 %
HCT VFR BLD CALC: 36.4 %
HGB BLD-MCNC: 11.1 G/DL
IMM GRANULOCYTES NFR BLD AUTO: 0.4 %
LYMPHOCYTES # BLD AUTO: 1.71 K/UL
LYMPHOCYTES NFR BLD AUTO: 18.6 %
MAN DIFF?: NORMAL
MCHC RBC-ENTMCNC: 30.1 PG
MCHC RBC-ENTMCNC: 30.5 G/DL
MCV RBC AUTO: 98.6 FL
MONOCYTES # BLD AUTO: 0.38 K/UL
MONOCYTES NFR BLD AUTO: 4.1 %
NEUTROPHILS # BLD AUTO: 6.97 K/UL
NEUTROPHILS NFR BLD AUTO: 75.8 %
PLATELET # BLD AUTO: 238 K/UL
RBC # BLD: 3.69 M/UL
RBC # FLD: 14.6 %
WBC # FLD AUTO: 9.2 K/UL

## 2024-12-09 ENCOUNTER — APPOINTMENT (OUTPATIENT)
Dept: OBGYN | Facility: CLINIC | Age: 39
End: 2024-12-09
Payer: COMMERCIAL

## 2024-12-09 VITALS
DIASTOLIC BLOOD PRESSURE: 72 MMHG | SYSTOLIC BLOOD PRESSURE: 123 MMHG | WEIGHT: 201 LBS | HEIGHT: 67 IN | BODY MASS INDEX: 31.55 KG/M2

## 2024-12-09 PROCEDURE — 0502F SUBSEQUENT PRENATAL CARE: CPT

## 2024-12-23 ENCOUNTER — APPOINTMENT (OUTPATIENT)
Dept: OBGYN | Facility: CLINIC | Age: 39
End: 2024-12-23
Payer: COMMERCIAL

## 2024-12-23 ENCOUNTER — APPOINTMENT (OUTPATIENT)
Dept: ANTEPARTUM | Facility: CLINIC | Age: 39
End: 2024-12-23
Payer: COMMERCIAL

## 2024-12-23 ENCOUNTER — ASOB RESULT (OUTPATIENT)
Age: 39
End: 2024-12-23

## 2024-12-23 VITALS
BODY MASS INDEX: 31.71 KG/M2 | SYSTOLIC BLOOD PRESSURE: 120 MMHG | DIASTOLIC BLOOD PRESSURE: 70 MMHG | WEIGHT: 202 LBS | HEIGHT: 67 IN

## 2024-12-23 PROCEDURE — 76819 FETAL BIOPHYS PROFIL W/O NST: CPT | Mod: 59

## 2024-12-23 PROCEDURE — 0502F SUBSEQUENT PRENATAL CARE: CPT

## 2024-12-23 PROCEDURE — 76816 OB US FOLLOW-UP PER FETUS: CPT

## 2024-12-27 ENCOUNTER — NON-APPOINTMENT (OUTPATIENT)
Age: 39
End: 2024-12-27

## 2024-12-29 PROBLEM — M54.9 BACK PAIN, ACUTE: Status: ACTIVE | Noted: 2024-12-29

## 2025-01-03 ENCOUNTER — APPOINTMENT (OUTPATIENT)
Dept: MRI IMAGING | Facility: CLINIC | Age: 40
End: 2025-01-03
Payer: COMMERCIAL

## 2025-01-03 ENCOUNTER — OUTPATIENT (OUTPATIENT)
Dept: OUTPATIENT SERVICES | Facility: HOSPITAL | Age: 40
LOS: 1 days | End: 2025-01-03
Payer: COMMERCIAL

## 2025-01-03 DIAGNOSIS — Z98.89 OTHER SPECIFIED POSTPROCEDURAL STATES: Chronic | ICD-10-CM

## 2025-01-03 DIAGNOSIS — Z98.890 OTHER SPECIFIED POSTPROCEDURAL STATES: Chronic | ICD-10-CM

## 2025-01-03 DIAGNOSIS — Z90.49 ACQUIRED ABSENCE OF OTHER SPECIFIED PARTS OF DIGESTIVE TRACT: Chronic | ICD-10-CM

## 2025-01-03 DIAGNOSIS — Z90.5 ACQUIRED ABSENCE OF KIDNEY: Chronic | ICD-10-CM

## 2025-01-03 DIAGNOSIS — Z34.90 ENCOUNTER FOR SUPERVISION OF NORMAL PREGNANCY, UNSPECIFIED, UNSPECIFIED TRIMESTER: ICD-10-CM

## 2025-01-03 PROCEDURE — 72148 MRI LUMBAR SPINE W/O DYE: CPT | Mod: 26

## 2025-01-03 PROCEDURE — 72148 MRI LUMBAR SPINE W/O DYE: CPT

## 2025-01-06 ENCOUNTER — APPOINTMENT (OUTPATIENT)
Dept: OBGYN | Facility: CLINIC | Age: 40
End: 2025-01-06
Payer: COMMERCIAL

## 2025-01-06 PROCEDURE — 0502F SUBSEQUENT PRENATAL CARE: CPT

## 2025-01-06 PROCEDURE — 90471 IMMUNIZATION ADMIN: CPT

## 2025-01-06 PROCEDURE — 90715 TDAP VACCINE 7 YRS/> IM: CPT

## 2025-01-09 ENCOUNTER — NON-APPOINTMENT (OUTPATIENT)
Age: 40
End: 2025-01-09

## 2025-01-22 ENCOUNTER — APPOINTMENT (OUTPATIENT)
Dept: OBGYN | Facility: CLINIC | Age: 40
End: 2025-01-22
Payer: COMMERCIAL

## 2025-01-22 VITALS
SYSTOLIC BLOOD PRESSURE: 121 MMHG | HEIGHT: 67 IN | BODY MASS INDEX: 32.33 KG/M2 | DIASTOLIC BLOOD PRESSURE: 72 MMHG | WEIGHT: 206 LBS

## 2025-01-22 PROCEDURE — 0502F SUBSEQUENT PRENATAL CARE: CPT

## 2025-01-22 PROCEDURE — 90471 IMMUNIZATION ADMIN: CPT

## 2025-01-22 PROCEDURE — 90678 RSV VACC PREF BIVALENT IM: CPT

## 2025-02-03 ENCOUNTER — APPOINTMENT (OUTPATIENT)
Dept: OBGYN | Facility: CLINIC | Age: 40
End: 2025-02-03
Payer: COMMERCIAL

## 2025-02-03 VITALS
HEIGHT: 67 IN | WEIGHT: 209 LBS | DIASTOLIC BLOOD PRESSURE: 65 MMHG | BODY MASS INDEX: 32.8 KG/M2 | SYSTOLIC BLOOD PRESSURE: 102 MMHG

## 2025-02-03 PROCEDURE — 0502F SUBSEQUENT PRENATAL CARE: CPT

## 2025-02-05 NOTE — OB RN DELIVERY SUMMARY - NS_RESUSCITEFFORT_OBGYN_ALL_OB
Spoke to pt while she was at her appt with ROSSI Hanan and reminded her of OD HIDA scan, pt expressed understanding to complete.      
Bulb Ly-Pharynx Suction Only

## 2025-02-10 ENCOUNTER — ASOB RESULT (OUTPATIENT)
Age: 40
End: 2025-02-10

## 2025-02-10 ENCOUNTER — APPOINTMENT (OUTPATIENT)
Dept: OBGYN | Facility: CLINIC | Age: 40
End: 2025-02-10
Payer: COMMERCIAL

## 2025-02-10 VITALS
SYSTOLIC BLOOD PRESSURE: 100 MMHG | DIASTOLIC BLOOD PRESSURE: 65 MMHG | BODY MASS INDEX: 32.65 KG/M2 | HEIGHT: 67 IN | WEIGHT: 208 LBS

## 2025-02-10 DIAGNOSIS — Z3A.36 36 WEEKS GESTATION OF PREGNANCY: ICD-10-CM

## 2025-02-10 PROCEDURE — 0502F SUBSEQUENT PRENATAL CARE: CPT

## 2025-02-12 ENCOUNTER — NON-APPOINTMENT (OUTPATIENT)
Age: 40
End: 2025-02-12

## 2025-02-12 ENCOUNTER — LABORATORY RESULT (OUTPATIENT)
Age: 40
End: 2025-02-12

## 2025-02-12 LAB
GP B STREP DNA SPEC QL NAA+PROBE: DETECTED
SOURCE GBS: NORMAL

## 2025-02-13 LAB
APPEARANCE: ABNORMAL
BACTERIA UR CULT: NORMAL
BACTERIA: ABNORMAL /HPF
BASOPHILS # BLD AUTO: 0.03 K/UL
BASOPHILS NFR BLD AUTO: 0.3 %
BILIRUBIN URINE: ABNORMAL
BLOOD URINE: NEGATIVE
CALCIUM OXALATE CRYSTALS: PRESENT
CAST: 2 /LPF
COLOR: NORMAL
EOSINOPHIL # BLD AUTO: 0.07 K/UL
EOSINOPHIL NFR BLD AUTO: 0.7 %
EPITHELIAL CELLS: 11 /HPF
GLUCOSE QUALITATIVE U: 100 MG/DL
HCT VFR BLD CALC: 34.9 %
HGB BLD-MCNC: 11.1 G/DL
HIV1+2 AB SPEC QL IA.RAPID: NONREACTIVE
IMM GRANULOCYTES NFR BLD AUTO: 0.4 %
KETONES URINE: ABNORMAL MG/DL
LEUKOCYTE ESTERASE URINE: NEGATIVE
LYMPHOCYTES # BLD AUTO: 1.94 K/UL
LYMPHOCYTES NFR BLD AUTO: 20.5 %
MAN DIFF?: NORMAL
MCHC RBC-ENTMCNC: 28 PG
MCHC RBC-ENTMCNC: 31.8 G/DL
MCV RBC AUTO: 87.9 FL
MICROSCOPIC-UA: NORMAL
MONOCYTES # BLD AUTO: 0.63 K/UL
MONOCYTES NFR BLD AUTO: 6.6 %
NEUTROPHILS # BLD AUTO: 6.77 K/UL
NEUTROPHILS NFR BLD AUTO: 71.5 %
NITRITE URINE: NEGATIVE
PH URINE: 5.5
PLATELET # BLD AUTO: 223 K/UL
PROTEIN URINE: 30 MG/DL
RBC # BLD: 3.97 M/UL
RBC # FLD: 14.5 %
RED BLOOD CELLS URINE: NORMAL /HPF
REVIEW: NORMAL
SPECIFIC GRAVITY URINE: >1.03
T PALLIDUM AB SER QL IA: NEGATIVE
UROBILINOGEN URINE: 1 MG/DL
WBC # FLD AUTO: 9.48 K/UL
WHITE BLOOD CELLS URINE: 13 /HPF

## 2025-02-19 ENCOUNTER — APPOINTMENT (OUTPATIENT)
Dept: OBGYN | Facility: CLINIC | Age: 40
End: 2025-02-19
Payer: COMMERCIAL

## 2025-02-19 VITALS
SYSTOLIC BLOOD PRESSURE: 116 MMHG | WEIGHT: 206 LBS | HEIGHT: 67 IN | BODY MASS INDEX: 32.33 KG/M2 | DIASTOLIC BLOOD PRESSURE: 66 MMHG

## 2025-02-19 PROCEDURE — 0502F SUBSEQUENT PRENATAL CARE: CPT

## 2025-02-20 ENCOUNTER — NON-APPOINTMENT (OUTPATIENT)
Age: 40
End: 2025-02-20

## 2025-02-26 ENCOUNTER — APPOINTMENT (OUTPATIENT)
Dept: OBGYN | Facility: CLINIC | Age: 40
End: 2025-02-26
Payer: COMMERCIAL

## 2025-02-26 ENCOUNTER — ASOB RESULT (OUTPATIENT)
Age: 40
End: 2025-02-26

## 2025-02-26 ENCOUNTER — APPOINTMENT (OUTPATIENT)
Dept: ANTEPARTUM | Facility: CLINIC | Age: 40
End: 2025-02-26
Payer: COMMERCIAL

## 2025-02-26 VITALS
BODY MASS INDEX: 32.33 KG/M2 | DIASTOLIC BLOOD PRESSURE: 72 MMHG | WEIGHT: 206 LBS | HEIGHT: 67 IN | SYSTOLIC BLOOD PRESSURE: 119 MMHG

## 2025-02-26 PROCEDURE — 76816 OB US FOLLOW-UP PER FETUS: CPT

## 2025-02-26 PROCEDURE — 0502F SUBSEQUENT PRENATAL CARE: CPT

## 2025-02-26 PROCEDURE — 76819 FETAL BIOPHYS PROFIL W/O NST: CPT | Mod: 59

## 2025-02-28 ENCOUNTER — ASOB RESULT (OUTPATIENT)
Age: 40
End: 2025-02-28

## 2025-02-28 ENCOUNTER — INPATIENT (INPATIENT)
Facility: HOSPITAL | Age: 40
LOS: 1 days | Discharge: HOME | End: 2025-03-02
Attending: OBSTETRICS & GYNECOLOGY | Admitting: OBSTETRICS & GYNECOLOGY
Payer: COMMERCIAL

## 2025-02-28 ENCOUNTER — APPOINTMENT (OUTPATIENT)
Dept: ANTEPARTUM | Facility: CLINIC | Age: 40
End: 2025-02-28

## 2025-02-28 VITALS
DIASTOLIC BLOOD PRESSURE: 60 MMHG | SYSTOLIC BLOOD PRESSURE: 100 MMHG | TEMPERATURE: 98 F | HEART RATE: 115 BPM | RESPIRATION RATE: 16 BRPM

## 2025-02-28 DIAGNOSIS — Z98.890 OTHER SPECIFIED POSTPROCEDURAL STATES: Chronic | ICD-10-CM

## 2025-02-28 DIAGNOSIS — Z90.5 ACQUIRED ABSENCE OF KIDNEY: Chronic | ICD-10-CM

## 2025-02-28 DIAGNOSIS — Z98.89 OTHER SPECIFIED POSTPROCEDURAL STATES: Chronic | ICD-10-CM

## 2025-02-28 DIAGNOSIS — O26.899 OTHER SPECIFIED PREGNANCY RELATED CONDITIONS, UNSPECIFIED TRIMESTER: ICD-10-CM

## 2025-02-28 DIAGNOSIS — Z90.49 ACQUIRED ABSENCE OF OTHER SPECIFIED PARTS OF DIGESTIVE TRACT: Chronic | ICD-10-CM

## 2025-02-28 LAB
BASOPHILS # BLD AUTO: 0.03 K/UL — SIGNIFICANT CHANGE UP (ref 0–0.2)
BASOPHILS NFR BLD AUTO: 0.2 % — SIGNIFICANT CHANGE UP (ref 0–2)
BLD GP AB SCN SERPL QL: NEGATIVE — SIGNIFICANT CHANGE UP
EOSINOPHIL # BLD AUTO: 0.55 K/UL — HIGH (ref 0–0.5)
EOSINOPHIL NFR BLD AUTO: 3 % — SIGNIFICANT CHANGE UP (ref 0–6)
HCT VFR BLD CALC: 35.5 % — SIGNIFICANT CHANGE UP (ref 34.5–45)
HGB BLD-MCNC: 11.4 G/DL — LOW (ref 11.5–15.5)
IANC: 14.57 K/UL — HIGH (ref 1.8–7.4)
IMM GRANULOCYTES NFR BLD AUTO: 0.7 % — SIGNIFICANT CHANGE UP (ref 0–0.9)
LYMPHOCYTES # BLD AUTO: 1.8 K/UL — SIGNIFICANT CHANGE UP (ref 1–3.3)
LYMPHOCYTES # BLD AUTO: 9.9 % — LOW (ref 13–44)
MCHC RBC-ENTMCNC: 27.1 PG — SIGNIFICANT CHANGE UP (ref 27–34)
MCHC RBC-ENTMCNC: 32.1 G/DL — SIGNIFICANT CHANGE UP (ref 32–36)
MCV RBC AUTO: 84.3 FL — SIGNIFICANT CHANGE UP (ref 80–100)
MONOCYTES # BLD AUTO: 1.18 K/UL — HIGH (ref 0–0.9)
MONOCYTES NFR BLD AUTO: 6.5 % — SIGNIFICANT CHANGE UP (ref 2–14)
NEUTROPHILS # BLD AUTO: 14.57 K/UL — HIGH (ref 1.8–7.4)
NEUTROPHILS NFR BLD AUTO: 79.7 % — HIGH (ref 43–77)
NRBC # BLD AUTO: 0 K/UL — SIGNIFICANT CHANGE UP (ref 0–0)
NRBC # FLD: 0 K/UL — SIGNIFICANT CHANGE UP (ref 0–0)
NRBC BLD AUTO-RTO: 0 /100 WBCS — SIGNIFICANT CHANGE UP (ref 0–0)
PLATELET # BLD AUTO: 248 K/UL — SIGNIFICANT CHANGE UP (ref 150–400)
RBC # BLD: 4.21 M/UL — SIGNIFICANT CHANGE UP (ref 3.8–5.2)
RBC # FLD: 14.4 % — SIGNIFICANT CHANGE UP (ref 10.3–14.5)
RH IG SCN BLD-IMP: POSITIVE — SIGNIFICANT CHANGE UP
WBC # BLD: 18.26 K/UL — HIGH (ref 3.8–10.5)
WBC # FLD AUTO: 18.26 K/UL — HIGH (ref 3.8–10.5)

## 2025-02-28 RX ORDER — SODIUM CHLORIDE 9 G/1000ML
1000 INJECTION, SOLUTION INTRAVENOUS
Refills: 0 | Status: DISCONTINUED | OUTPATIENT
Start: 2025-02-28 | End: 2025-03-01

## 2025-02-28 RX ORDER — INFLUENZA A VIRUS A/IDAHO/07/2018 (H1N1) ANTIGEN (MDCK CELL DERIVED, PROPIOLACTONE INACTIVATED, INFLUENZA A VIRUS A/INDIANA/08/2018 (H3N2) ANTIGEN (MDCK CELL DERIVED, PROPIOLACTONE INACTIVATED), INFLUENZA B VIRUS B/SINGAPORE/INFTT-16-0610/2016 ANTIGEN (MDCK CELL DERIVED, PROPIOLACTONE INACTIVATED), INFLUENZA B VIRUS B/IOWA/06/2017 ANTIGEN (MDCK CELL DERIVED, PROPIOLACTONE INACTIVATED) 15; 15; 15; 15 UG/.5ML; UG/.5ML; UG/.5ML; UG/.5ML
0.5 INJECTION, SUSPENSION INTRAMUSCULAR ONCE
Refills: 0 | Status: DISCONTINUED | OUTPATIENT
Start: 2025-02-28 | End: 2025-03-02

## 2025-02-28 RX ORDER — VANCOMYCIN HCL IN 5 % DEXTROSE 1.5G/250ML
1750 PLASTIC BAG, INJECTION (ML) INTRAVENOUS EVERY 8 HOURS
Refills: 0 | Status: DISCONTINUED | OUTPATIENT
Start: 2025-02-28 | End: 2025-02-28

## 2025-02-28 RX ORDER — OXYTOCIN-SODIUM CHLORIDE 0.9% IV SOLN 30 UNIT/500ML 30-0.9/5 UT/ML-%
SOLUTION INTRAVENOUS
Qty: 30 | Refills: 0 | Status: DISCONTINUED | OUTPATIENT
Start: 2025-02-28 | End: 2025-03-01

## 2025-02-28 RX ORDER — VANCOMYCIN HCL IN 5 % DEXTROSE 1.5G/250ML
1881 PLASTIC BAG, INJECTION (ML) INTRAVENOUS EVERY 8 HOURS
Refills: 0 | Status: DISCONTINUED | OUTPATIENT
Start: 2025-02-28 | End: 2025-02-28

## 2025-02-28 RX ORDER — VANCOMYCIN HCL IN 5 % DEXTROSE 1.5G/250ML
1500 PLASTIC BAG, INJECTION (ML) INTRAVENOUS EVERY 8 HOURS
Refills: 0 | Status: DISCONTINUED | OUTPATIENT
Start: 2025-02-28 | End: 2025-02-28

## 2025-02-28 RX ORDER — CEFAZOLIN SODIUM IN 0.9 % NACL 3 G/100 ML
2000 INTRAVENOUS SOLUTION, PIGGYBACK (ML) INTRAVENOUS ONCE
Refills: 0 | Status: COMPLETED | OUTPATIENT
Start: 2025-02-28 | End: 2025-02-28

## 2025-02-28 RX ORDER — CITRIC ACID/SODIUM CITRATE 300-500 MG
15 SOLUTION, ORAL ORAL EVERY 6 HOURS
Refills: 0 | Status: DISCONTINUED | OUTPATIENT
Start: 2025-02-28 | End: 2025-03-01

## 2025-02-28 RX ORDER — OXYTOCIN-SODIUM CHLORIDE 0.9% IV SOLN 30 UNIT/500ML 30-0.9/5 UT/ML-%
167 SOLUTION INTRAVENOUS
Qty: 30 | Refills: 0 | Status: DISCONTINUED | OUTPATIENT
Start: 2025-02-28 | End: 2025-03-01

## 2025-02-28 RX ORDER — CEFAZOLIN SODIUM IN 0.9 % NACL 3 G/100 ML
1000 INTRAVENOUS SOLUTION, PIGGYBACK (ML) INTRAVENOUS EVERY 8 HOURS
Refills: 0 | Status: DISCONTINUED | OUTPATIENT
Start: 2025-03-01 | End: 2025-03-01

## 2025-02-28 RX ORDER — CEFAZOLIN SODIUM IN 0.9 % NACL 3 G/100 ML
INTRAVENOUS SOLUTION, PIGGYBACK (ML) INTRAVENOUS
Refills: 0 | Status: DISCONTINUED | OUTPATIENT
Start: 2025-02-28 | End: 2025-03-01

## 2025-02-28 RX ADMIN — OXYTOCIN-SODIUM CHLORIDE 0.9% IV SOLN 30 UNIT/500ML 2 MILLIUNIT(S)/MIN: 30-0.9/5 SOLUTION at 22:40

## 2025-02-28 RX ADMIN — Medication 100 MILLIGRAM(S): at 22:13

## 2025-02-28 NOTE — OB RN TRIAGE NOTE - FALL HARM RISK - UNIVERSAL INTERVENTIONS
Bed in lowest position, wheels locked, appropriate side rails in place/Call bell, personal items and telephone in reach/Instruct patient to call for assistance before getting out of bed or chair/Non-slip footwear when patient is out of bed/[place] to call system/Physically safe environment - no spills, clutter or unnecessary equipment/Purposeful Proactive Rounding/Room/bathroom lighting operational, light cord in reach

## 2025-02-28 NOTE — OB RN PATIENT PROFILE - THE IMPORTANCE OF THE NEWBORN'S COMFORT AND THERMOREGULATION DURING SKIN TO SKIN: ANY PART OF INFANT SKIN NOT TOUCHING PARENT'S SKIN IS TO BE COVERED BY A BLANKET.
07/13/23 1107   Appointment Info   Signing Clinician's Name / Credentials (SLP) Adina Kraft MA CCC-SLP   General Information   Onset of Illness/Injury or Date of Surgery 06/30/23   Referring Physician Mark Chavarria MD   Patient/Family Therapy Goal Statement (SLP) None stated   Pertinent History of Current Problem Pt is a 83 year old F with history of breast cancer, presenting with superior vermian IPH, ICH score 2. Now, POD-12 s/p suboccipital craniotomy for resection of AVM. Videofluoroscopic swallow study completed per MD order.   General Observations Upon SLP arrival, pt positioned upright in chair, alert, and willing to participate.   Type of Evaluation   Type of Evaluation Swallow Evaluation   General Swallowing Observations   Respiratory Support (General Swallowing Observations) none   Current Diet/Method of Nutritional Intake (General Swallowing Observations, NIS) full liquid diet;mildly thick liquids (level 2)   Swallowing Evaluation Videofluoroscopic swallow study (VFSS)   VFSS Evaluation   Radiologist Resident   Views Taken left lateral   Physical Location of Procedure [facility] [address]   VFSS Textures Trialed thin liquids;mildly thick liquids;pureed;solid foods   VFSS Eval: Thin Liquid Texture Trial   Mode of Presentation, Thin Liquid cup;self-fed;spoon;fed by clinician   Order of Presentation 1, 2, 3, 4, 11, 12, 13, 14   Preparatory Phase WFL   Oral Phase, Thin Liquid premature pharyngeal entry   Bolus Location When Swallow Triggered pyriforms   Pharyngeal Phase, Thin Liquid impaired epiglottic movement;impaired tongue base retraction   Rosenbek's Penetration Aspiration Scale: Thin Liquid Trial Results 7 - contrast passes glottis, visible subglottic residue remains despite patient's response (aspiration)   Response to Aspiration unproductive reflexive cough   Strategies and Compensations chin tuck   Diagnostic Statement Aspiration evident w/ cup sips; chin tuck effective   VFSS Eval: Mildly  Thick Liquids   Mode of Presentation cup;straw;self-fed;fed by clinician   Order of Presentation 5, 6, 7, 10   Preparatory Phase WFL   Oral Phase WFL   Bolus Location When Swallow Triggered pyriforms   Pharyngeal Phase impaired epiglottic movement;impaired tongue base retraction   Rosenbek's Penetration Aspiration Scale 1 - no aspiration, contrast does not enter airway   Diagnostic Statement No penetration/aspiration   VFSS Evaluation: Puree Solid Texture Trial   Mode of Presentation, Puree spoon;fed by clinician   Order of Presentation 8   Preparatory Phase WFL   Oral Phase, Puree WFL   Bolus Location When Swallow Triggered valleculae   Pharyngeal Phase, Puree impaired tongue base retraction;impaired epiglottic movement;residue in vallecula;residue in pyriform sinus   Rosenbek's Penetration Aspiration Scale: Puree Food Trial Results 1 - no aspiration, contrast does not enter airway   Diagnostic Statement No penetration/aspiration   VFSS Evaluation: Solid Food Texture Trial   Mode of Presentation, Solid fed by clinician   Order of Presentation 9   Preparatory Phase prolonged bolus preparation  (Prolonged mastication)   Oral Phase, Solid residue in oral cavity   Bolus Location When Swallow Triggered valleculae   Pharyngeal Phase, Solid residue in vallecula;residue in pyriform sinus;impaired tongue base retraction;impaired epiglottic movement   Rosenbek's Penetration Aspiration Scale: Solid Food Trial Results 1 - no aspiration, contrast does not enter airway   Diagnostic Statement No penetration/aspiration   Esophageal Phase of Swallow   Patient reports or presents with symptoms of esophageal dysphagia No   Swallowing Recommendations   Diet Consistency Recommendations thin liquids (level 0);soft & bite-sized (level 6)   Supervision Level for Intake close supervision needed   Mode of Delivery Recommendations bolus size, small;slow rate of intake   Swallowing Maneuver Recommendations alternate food and liquid intake    Monitoring/Assistance Required (Eating/Swallowing) check mouth frequently for oral residue/pocketing;cue for finger/lingual sweep if oral pocketing present;stop eating activities when fatigue is present;monitor for cough or change in vocal quality with intake;optimize oral intake to minimize need for tube feeding   Recommended Feeding/Eating Techniques (Swallow Eval) maintain upright sitting position for eating;set-up and prepare tray;maintain upright posture during/after eating for 30 minutes   Medication Administration Recommendations, Swallowing (SLP) As tolerated   Instrumental Assessment Recommendations   (SLP to request VFSS as warrnated)   General Therapy Interventions   Planned Therapy Interventions Dysphagia Treatment   Dysphagia treatment Oropharyngeal exercise training;Modified diet education;Instruction of safe swallow strategies;Compensatory strategies for swallowing   Clinical Impression   Criteria for Skilled Therapeutic Interventions Met (SLP Eval) Yes, treatment indicated   SLP Diagnosis Moderate oropharyngeal dysphagia   Problem List (SLP) Below baseline swallow mechanism   Risks & Benefits of therapy have been explained evaluation/treatment results reviewed;care plan/treatment goals reviewed;risks/benefits reviewed;participants voiced agreement with care plan;current/potential barriers reviewed;participants included;patient   Clinical Impression Comments Videofluoroscopic swallow study (VFSS) completed per MD order. Under fluoroscopy, pt presents w/ moderate oropharyngeal dysphagia. Some improvement noted compared to previous VFSS. Pt assessed w/ thin and mildly thick liquids, puree, and solids. Oral phase characterized by prolonged mastication. Swallow trigger delayed w/ the bolus head in the pyriforms before the swallow is triggered. Once triggered, incomplete laryngeal vestibular closure and narrow column of contrast b/w tongue base and pharyngeal wall. No epiglottic inversion appreciated.  Collection of residue visible in valleculae after solids which clears w/ liquid rinse. Aspiration evident w/ thin liquids via cup. Chin tuck effective in preventing aspiration. Recommend soft and bite sized diet (IDDSI 6) and thin liquids, w/ close supervision. Meds OK crushed or whole in puree. Pt MUST complete chin tuck w/ each sip of liquid and perform liquid rinse after solids to clear residue. SLP to follow.   SLP Total Evaluation Time   Evaluation, videofluoroscopic eval of swallow function Minutes (28048) 23   SLP Discharge Planning   SLP Plan Diet tolerance/upgrade (if not tolerating thin liquids- OK to downgrade to mildly thick- pt is sensate to aspiration), pt will require repeat VFSS d/t aspiration ~2 weeks from 7/13, pt MUST follow swallow strategies including chin tuck and liquid rinse   SLP Discharge Recommendation Acute [facility]hab Center-Motivated patient will benefit from intensive, interdisciplinary therapy.  Anticipate will be able to tolerate 3 hours of therapy per day   SLP Rationale for DC Rec Modified diet, will require repeat VFSS, dysarthria   SLP Brief overview of current status  Recommend soft and bite sized diet (IDDSI 6) and thin liquids, w/ close supervision. Meds OK crushed or whole in puree. Pt MUST complete chin tuck w/ each sip of liquid and perform liquid rinse after solids to clear residue. SLP to follow.   Total Session Time   Total Session Time (sum of timed and untimed services) 35      Statement Selected

## 2025-02-28 NOTE — OB PROVIDER H&P - ASSESSMENT
38y/o  @38.6wks gestation admit for TOLAC     -Admit to L&D   -Routine labs   -IV fluids   -Clear liquid diet   -Vancomycin for GBS prophylaxis (PCN allergy)  -Plan for epidural and Pitocin     Blood transfusion and labor consents obtained. Risks, benefits, alternatives and possible complications have been discussed in detail with the patient in her native language. Pre-admission, admission, and post admission procedures and expectations were discussed in detail. All questions answered, all appropriate hospital consents were signed. Anticipate normal vaginal delivery. Informed Consent was obtained. The following was discussed:     Augmentation of Labor: Use of medication to enhance labor  Obstetrical management including internal fetal/contraction monitoring  Normal vaginal delivery  Possible  Section: (surgical cut in the abdomen in order to deliver the baby)    D/W Dr. Guido Gutierrez, NP

## 2025-02-28 NOTE — OB PROVIDER H&P - HISTORY OF PRESENT ILLNESS
40y/o  @38.6wks gestation presents with contraction pain and back pain  starting since 8am.  Patient verbalizes vaginal leaking unsure if it is vaginal discharge or rupture of membranes.  Denies vaginal bleeding, headache, chest pain, epigastric pain, blurred vision, dizziness, SOB.  Endorses +FM     PNC: Nidia, High risk   GBS positive 2/10/2025

## 2025-02-28 NOTE — OB RN PATIENT PROFILE - NS_OBGYNHISTORY_OBGYN_ALL_OB_FT
OBHx   6  FT 7#1  2007 Primary  placenta previa 7#1  2012  7#1  3/14/2022   IUGR 5#13    GYNHx  History of Leep procedure   Right and left ovarian cystectomy  h/o of abnormal pap-colposcopy  partial removal of cervix (10%)

## 2025-02-28 NOTE — OB RN PATIENT PROFILE - BREAST MILK IS MORE DIGESTIBLE, MAKING VOMITING, DIARRHEA, GAS AND CONSTIPATION LESS COMMON
Detail Level: Detailed Bill J-Code?: Yes Bill For Wasted Drug?: no Size Of Lesion In Cm (Optional): 1 Medication Injected: 5-Fluorouracil Concentration (Mg/Ml Or Units/Ml): 50.0 Total Volume (Ccs): 0.3 Lot # (Optional): [id number] Expiration Date (Optional): 07/2024 Ndc# (Optional): 58364-949-58 Administered By (Optional): Eric Sloan Post-Care Instructions: I reviewed with the patient in detail post-care instructions. Patient is to keep the site dry overnight, and then apply bacitracin twice daily until healed. Patient may apply hydrogen peroxide soaks to remove any crusting. Consent: The risks of medication reaction, injection site pain and lesion necrosis were reviewed with the patient. Bill As A Line Item Or As Units: Line Item Size Of Lesion In Cm (Optional): 2.3 X Size Of Lesion In Cm (Optional): 1.7 Total Volume (Ccs): 0.7 Statement Selected

## 2025-02-28 NOTE — OB PROVIDER H&P - NSHPPHYSICALEXAM_GEN_ALL_CORE
T(C): 36.7 (02-28-25 @ 18:56), Max: 36.7 (02-28-25 @ 18:56)  HR: 100 (02-28-25 @ 20:56) (93 - 123)  BP: 119/72 (02-28-25 @ 20:56) (100/60 - 127/76)  RR: 16 (02-28-25 @ 18:56) (16 - 16)  SpO2: 93% (02-28-25 @ 20:47) (89% - 98%)    Physical Exam  Gen: NAD  Cardiac: RRR  Pulm: Unlabored, breathing comfortably on room air  Abdomen: soft, nontender, gravid    TAUS: cephalic, placenta, MVP/RICCO , BPP , FHR  bpm via m-mode, images saved in ASOB  TVUS: cervical length __cm, no funneling or dynamic changes, images saved in ASOB  SSE:   VE:  EFM:  Everetts: T(C): 36.7 (02-28-25 @ 18:56), Max: 36.7 (02-28-25 @ 18:56)  HR: 100 (02-28-25 @ 20:56) (93 - 123)  BP: 119/72 (02-28-25 @ 20:56) (100/60 - 127/76)  RR: 16 (02-28-25 @ 18:56) (16 - 16)  SpO2: 93% (02-28-25 @ 20:47) (89% - 98%)    Physical Exam  Gen: NAD  Cardiac: RRR  Pulm: Unlabored, breathing comfortably on room air  Abdomen: soft, nontender, gravid    TAUS: cephalic, placenta, RICCO 15.49,  bpm via m-mode, images saved in ASOB  SSE: Negative pooling, Negative Nitrazine, Negative Fern  VE: 3/70/-3  EFM:135bpm/ moderate variability/ +accels, no decels/ Reactive NST  Luxora: Q6-8min

## 2025-02-28 NOTE — OB RN TRIAGE NOTE - NS_OBGYNHISTORY_OBGYN_ALL_OB_FT
6  FT 7#1  2007 C/S placenta previa 7#1  2012  7#1  3/14/2022   IUGR 5#13 05  FT 7#1  2007 C/S placenta previa 7#1  2012  7#1  3/14/2022   IUGR 5#13  Ovarian cancer  Cervical cancer  Leep  Right and left ovarian cystectomy  h/o of abnormal pap-colposcopy  partial removal of cervix (10%)

## 2025-02-28 NOTE — OB PROVIDER H&P - NS_OBGYNHISTORY_OBGYN_ALL_OB_FT
6  FT 7#1  2007 C/S placenta previa 7#1  2012  7#1  3/14/2022   IUGR 5#13  Ovarian cancer  Cervical cancer  Leep  Right and left ovarian cystectomy  h/o of abnormal pap-colposcopy  partial removal of cervix (10%) OBHx   6  FT 7#1  2007 Primary  placenta previa 7#1  2012  7#1  3/14/2022   IUGR 5#13    GYNHx  History of Leep procedure   Right and left ovarian cystectomy  h/o of abnormal pap-colposcopy  partial removal of cervix (10%)

## 2025-03-01 RX ORDER — KETOROLAC TROMETHAMINE 30 MG/ML
30 INJECTION, SOLUTION INTRAMUSCULAR; INTRAVENOUS ONCE
Refills: 0 | Status: DISCONTINUED | OUTPATIENT
Start: 2025-03-01 | End: 2025-03-01

## 2025-03-01 RX ORDER — PRENATAL 136/IRON/FOLIC ACID 27 MG-1 MG
1 TABLET ORAL DAILY
Refills: 0 | Status: DISCONTINUED | OUTPATIENT
Start: 2025-03-01 | End: 2025-03-02

## 2025-03-01 RX ORDER — DIPHENHYDRAMINE HCL 12.5MG/5ML
25 ELIXIR ORAL EVERY 6 HOURS
Refills: 0 | Status: DISCONTINUED | OUTPATIENT
Start: 2025-03-01 | End: 2025-03-02

## 2025-03-01 RX ORDER — IBUPROFEN 200 MG
600 TABLET ORAL EVERY 6 HOURS
Refills: 0 | Status: DISCONTINUED | OUTPATIENT
Start: 2025-03-01 | End: 2025-03-02

## 2025-03-01 RX ORDER — BENZOCAINE 220 MG/G
1 SPRAY, METERED PERIODONTAL EVERY 6 HOURS
Refills: 0 | Status: DISCONTINUED | OUTPATIENT
Start: 2025-03-01 | End: 2025-03-02

## 2025-03-01 RX ORDER — MAGNESIUM HYDROXIDE 400 MG/5ML
30 SUSPENSION ORAL
Refills: 0 | Status: DISCONTINUED | OUTPATIENT
Start: 2025-03-01 | End: 2025-03-02

## 2025-03-01 RX ORDER — ACETAMINOPHEN 500 MG/5ML
975 LIQUID (ML) ORAL
Refills: 0 | Status: DISCONTINUED | OUTPATIENT
Start: 2025-03-01 | End: 2025-03-02

## 2025-03-01 RX ORDER — DIBUCAINE 10 MG/G
1 OINTMENT TOPICAL EVERY 6 HOURS
Refills: 0 | Status: DISCONTINUED | OUTPATIENT
Start: 2025-03-01 | End: 2025-03-02

## 2025-03-01 RX ORDER — OXYTOCIN-SODIUM CHLORIDE 0.9% IV SOLN 30 UNIT/500ML 30-0.9/5 UT/ML-%
167 SOLUTION INTRAVENOUS
Qty: 30 | Refills: 0 | Status: DISCONTINUED | OUTPATIENT
Start: 2025-03-01 | End: 2025-03-01

## 2025-03-01 RX ORDER — CLOSTRIDIUM TETANI TOXOID ANTIGEN (FORMALDEHYDE INACTIVATED), CORYNEBACTERIUM DIPHTHERIAE TOXOID ANTIGEN (FORMALDEHYDE INACTIVATED), BORDETELLA PERTUSSIS TOXOID ANTIGEN (GLUTARALDEHYDE INACTIVATED), BORDETELLA PERTUSSIS FILAMENTOUS HEMAGGLUTININ ANTIGEN (FORMALDEHYDE INACTIVATED), BORDETELLA PERTUSSIS PERTACTIN ANTIGEN, AND BORDETELLA PERTUSSIS FIMBRIAE 2/3 ANTIGEN 5; 2; 2.5; 5; 3; 5 [LF]/.5ML; [LF]/.5ML; UG/.5ML; UG/.5ML; UG/.5ML; UG/.5ML
0.5 INJECTION, SUSPENSION INTRAMUSCULAR ONCE
Refills: 0 | Status: DISCONTINUED | OUTPATIENT
Start: 2025-03-01 | End: 2025-03-02

## 2025-03-01 RX ORDER — IBUPROFEN 200 MG
600 TABLET ORAL EVERY 6 HOURS
Refills: 0 | Status: COMPLETED | OUTPATIENT
Start: 2025-03-01 | End: 2026-01-28

## 2025-03-01 RX ORDER — WITCH HAZEL LEAF
1 FLUID EXTRACT MISCELLANEOUS EVERY 4 HOURS
Refills: 0 | Status: DISCONTINUED | OUTPATIENT
Start: 2025-03-01 | End: 2025-03-02

## 2025-03-01 RX ORDER — HYDROCORTISONE 10 MG/G
1 CREAM TOPICAL EVERY 6 HOURS
Refills: 0 | Status: DISCONTINUED | OUTPATIENT
Start: 2025-03-01 | End: 2025-03-02

## 2025-03-01 RX ORDER — SIMETHICONE 80 MG
80 TABLET,CHEWABLE ORAL EVERY 4 HOURS
Refills: 0 | Status: DISCONTINUED | OUTPATIENT
Start: 2025-03-01 | End: 2025-03-02

## 2025-03-01 RX ORDER — PRAMOXINE HCL 1 %
1 GEL (GRAM) TOPICAL EVERY 4 HOURS
Refills: 0 | Status: DISCONTINUED | OUTPATIENT
Start: 2025-03-01 | End: 2025-03-02

## 2025-03-01 RX ORDER — MODIFIED LANOLIN 100 %
1 CREAM (GRAM) TOPICAL EVERY 6 HOURS
Refills: 0 | Status: DISCONTINUED | OUTPATIENT
Start: 2025-03-01 | End: 2025-03-02

## 2025-03-01 RX ADMIN — OXYTOCIN-SODIUM CHLORIDE 0.9% IV SOLN 30 UNIT/500ML 167 MILLIUNIT(S)/MIN: 30-0.9/5 SOLUTION at 05:22

## 2025-03-01 RX ADMIN — Medication 3 MILLILITER(S): at 14:40

## 2025-03-01 RX ADMIN — SODIUM CHLORIDE 125 MILLILITER(S): 9 INJECTION, SOLUTION INTRAVENOUS at 01:09

## 2025-03-01 NOTE — OB RN DELIVERY SUMMARY - NS_GENERALBABYACOMMENTA_OBGYN_ALL_OB_FT
STS delayed d/t maternal request. STS delayed d/t maternal request.  Maternal request to formula feed

## 2025-03-01 NOTE — DISCHARGE NOTE OB - PATIENT PORTAL LINK FT
You can access the FollowMyHealth Patient Portal offered by [facility] by registering at the following website: [email or web address]. By joining [employer]’s FollowMyHealth portal, you will also be able to view your health information using other applications (apps) compatible with our system.

## 2025-03-01 NOTE — DISCHARGE NOTE OB - CARE PLAN
1 Principal Discharge DX:	Vaginal delivery  Assessment and plan of treatment:	routine care   Principal Discharge DX:	Vaginal delivery  Assessment and plan of treatment:	After discharge, please stay on pelvic rest for 6 weeks, meaning no sexual intercourse, no tampons and no douching.  No driving for 2 weeks.  No lifting objects heavier than baby for 2 weeks.  Expect to have vaginal bleeding/spotting for up to six weeks.  The bleeding should get lighter and more white/light brown with time.  For bleeding soaking more than a pad an hour or passing clots greater than the size of your fist, come in to the emergency department.  Follow up in the office in 6 weeks

## 2025-03-01 NOTE — CHART NOTE - NSCHARTNOTEFT_GEN_A_CORE
Called by RN. Patient refusing having 2 IV lines at this time.    Spoke to patient at bedside. Patient reporting she would not like a second IV at this time. She shares that she has had 2 vaginal deliveries after her C/S without any complications and did not have 2 IVs during her past two labor courses. She reports a past painful experience of her IV blowing in her hand. Spoke to patient at length that she is at an increased risk for bleeding and rupture as she has a prior uterine scar. Given she is a multiparous pt, she is at an even higher risk for bleeding. D/W pt that in the case of an emergency, placing another IV would be difficult and delay treatment. Pt expresses understanding and continues to refuse at this time.    D/W Dr. Pimentel, PGY3, and Dr. Akhtar, .  Dr. Lora made aware  Sofi Cordova, PGY1

## 2025-03-01 NOTE — OB PROVIDER DELIVERY SUMMARY - NSPROVIDERDELIVERYNOTE_OBGYN_ALL_OB_FT
Spontaneous vaginal delivery of liveborn female infant from OA position. Head, shoulders, and body delivered easily. Nuchal around neck x 1 noted, delivered through and reduced. Infant was suctioned. Terminal mec. Delayed cord clamping was performed x 60seconds. Cord was clamped and cut and infant was passed to mother. Placenta delivered intact with a 3 vessel cord. Fundal massage was given and uterine fundus was found to be firm. Vaginal exam revealed an intact cervix, vaginal walls and sulci. Pernieum intact. Excellent hemostasis was noted. Patient was stable. Count was correct x 2. EBL 256ccs.    Delivered with Dr. Lora,  Sofi Cordova, PGY1

## 2025-03-01 NOTE — DISCHARGE NOTE OB - MEDICATION SUMMARY - MEDICATIONS TO STOP TAKING
I will STOP taking the medications listed below when I get home from the hospital:    Cleocin HCl 300 mg oral capsule  -- 1 cap(s) by mouth every 8 hours MDD:3 caps  -- Finish all this medication unless otherwise directed by prescriber.  Medication should be taken with plenty of water.    oxycodone-acetaminophen 5 mg-325 mg oral tablet  -- 1 tab(s) by mouth every 6 hours, As Needed -for severe pain MDD:4 tabs   -- Caution federal law prohibits the transfer of this drug to any person other  than the person for whom it was prescribed.  May cause drowsiness.  Alcohol may intensify this effect.  Use care when operating dangerous machinery.  This prescription cannot be refilled.  This product contains acetaminophen.  Do not use  with any other product containing acetaminophen to prevent possible liver damage.  Using more of this medication than prescribed may cause serious breathing problems.    Paxlovid 300 mg-100 mg Dose Pack oral tablet  -- 1 unspecified by mouth 2 times a day Dose pack, use as directed, 2 nirmaltrelvir tabs and 1 ritonavir tablet, 2x per day for 5 days

## 2025-03-01 NOTE — DISCHARGE NOTE OB - PLAN OF CARE
routine care After discharge, please stay on pelvic rest for 6 weeks, meaning no sexual intercourse, no tampons and no douching.  No driving for 2 weeks.  No lifting objects heavier than baby for 2 weeks.  Expect to have vaginal bleeding/spotting for up to six weeks.  The bleeding should get lighter and more white/light brown with time.  For bleeding soaking more than a pad an hour or passing clots greater than the size of your fist, come in to the emergency department.  Follow up in the office in 6 weeks

## 2025-03-01 NOTE — DISCHARGE NOTE OB - FINANCIAL ASSISTANCE
[facility] provides services at a reduced cost to those who are determined to be eligible through [facility]’s financial assistance program. Information regarding [facility]’s financial assistance program can be found by going to [email or web address] or by calling [phone].

## 2025-03-01 NOTE — OB RN DELIVERY SUMMARY - NSSELHIDDEN_OBGYN_ALL_OB_FT
[NS_[email or web address]_ALL_OB_FT:[id number]],[NS_DeliveryAssist1_OBGYN_ALL_OB_FT:[id number]],[NS_DeliveryRN_OBGYN_ALL_OB_FT:[id number]=]

## 2025-03-01 NOTE — OB PROVIDER LABOR PROGRESS NOTE - ASSESSMENT
39y  @39w in labor, TOLAC     -Cat I tracing, ctx regularly.  -Continue maternal/fetal monitoring.  -Expect     D/W Dr. Guido Cordova MD PGY1
 @39wks TOLAC  AROM clear fluid without incident  Cont pitocin  Cont fetal/maternal monitoring  Will reassess PRN  Anticipate     yara Robles NP

## 2025-03-01 NOTE — DISCHARGE NOTE OB - CARE PROVIDER_API CALL
Melvin Jacob  Maternal/Fetal Medicine  [address], [address]  [place] [address]  Phone: [phone]  Fax: [phone]  Follow Up Time: 1 month

## 2025-03-01 NOTE — OB RN DELIVERY SUMMARY - NS_SEPSISRSKCALC_OBGYN_ALL_OB_FT
EOS calculated successfully. EOS Risk Factor: 0.5/1000 live births ([facility] national incidence); GA=39w;Temp=98.6; ROM=3.067; GBS='Positive'; Antibiotics='Broad spectrum antibiotics > 4 hrs prior to birth'

## 2025-03-01 NOTE — DISCHARGE NOTE OB - MEDICATION SUMMARY - MEDICATIONS TO TAKE
I will START or STAY ON the medications listed below when I get home from the hospital:    ibuprofen 600 mg oral tablet  -- 1 tab(s) by mouth every 6 hours  -- Indication: For Vaginal delivery after  section    acetaminophen 325 mg oral tablet  -- 3 tab(s) by mouth every 8 hours  -- Indication: For Vaginal delivery after  section   I will START or STAY ON the medications listed below when I get home from the hospital:    ibuprofen 600 mg oral tablet  -- 1 tab(s) by mouth every 6 hours as needed for  moderate pain  -- Indication: For Moderate pain    acetaminophen 325 mg oral tablet  -- 3 tab(s) by mouth every 6 hours as needed for  mild pain  -- Indication: For Mild pain    Prenatal Multivitamins with Folic Acid 1 mg oral tablet  -- 1 tab(s) by mouth once a day  -- Indication: For Vitamins

## 2025-03-01 NOTE — OB PROVIDER DELIVERY SUMMARY - NSSELHIDDEN_OBGYN_ALL_OB_FT
[NS_DeliveryAttending1_OBGYN_ALL_OB_FT:[id number]],[NS_DeliveryAssist1_OBGYN_ALL_OB_FT:[id number]]

## 2025-03-01 NOTE — OB PROVIDER LABOR PROGRESS NOTE - NS_SUBJECTIVE/OBJECTIVE_OBGYN_ALL_OB_FT
Patient reports increased pelvic pressure and vomiting.
Patient seen and examined for AROM. Patient comfortable with epidural.  VS  T(C): 36.6 (02-28-25 @ 23:37)  HR: 89 (03-01-25 @ 00:54)  BP: 112/65 (03-01-25 @ 00:40)  RR: 18 (02-28-25 @ 23:37)  SpO2: 92% (03-01-25 @ 00:54)

## 2025-03-01 NOTE — DISCHARGE NOTE OB - NS MD DC FALL RISK RISK
For information on Fall & Injury Prevention, visit: [email or web address] OR  [email or web address] OR  https://www.cdc.gov/steadi/patient.html

## 2025-03-02 VITALS
OXYGEN SATURATION: 98 % | TEMPERATURE: 98 F | SYSTOLIC BLOOD PRESSURE: 101 MMHG | DIASTOLIC BLOOD PRESSURE: 68 MMHG | RESPIRATION RATE: 18 BRPM | HEART RATE: 66 BPM

## 2025-03-02 LAB
ADD ON TEST-SPECIMEN IN LAB: SIGNIFICANT CHANGE UP
BASOPHILS # BLD AUTO: 0.06 K/UL — SIGNIFICANT CHANGE UP (ref 0–0.2)
BASOPHILS NFR BLD AUTO: 0.4 % — SIGNIFICANT CHANGE UP (ref 0–2)
EOSINOPHIL # BLD AUTO: 0.07 K/UL — SIGNIFICANT CHANGE UP (ref 0–0.5)
EOSINOPHIL NFR BLD AUTO: 0.5 % — SIGNIFICANT CHANGE UP (ref 0–6)
HCT VFR BLD CALC: 33.2 % — LOW (ref 34.5–45)
HCT VFR BLD CALC: 33.2 % — LOW (ref 34.5–45)
HGB BLD-MCNC: 10.9 G/DL — LOW (ref 11.5–15.5)
HGB BLD-MCNC: 10.9 G/DL — LOW (ref 11.5–15.5)
IANC: 11.1 K/UL — HIGH (ref 1.8–7.4)
IMM GRANULOCYTES NFR BLD AUTO: 0.5 % — SIGNIFICANT CHANGE UP (ref 0–0.9)
LYMPHOCYTES # BLD AUTO: 15.8 % — SIGNIFICANT CHANGE UP (ref 13–44)
LYMPHOCYTES # BLD AUTO: 2.25 K/UL — SIGNIFICANT CHANGE UP (ref 1–3.3)
MCHC RBC-ENTMCNC: 27.8 PG — SIGNIFICANT CHANGE UP (ref 27–34)
MCHC RBC-ENTMCNC: 32.8 G/DL — SIGNIFICANT CHANGE UP (ref 32–36)
MCV RBC AUTO: 88.3 FL — SIGNIFICANT CHANGE UP (ref 80–100)
MONOCYTES # BLD AUTO: 0.69 K/UL — SIGNIFICANT CHANGE UP (ref 0–0.9)
MONOCYTES NFR BLD AUTO: 4.8 % — SIGNIFICANT CHANGE UP (ref 2–14)
NEUTROPHILS # BLD AUTO: 11.1 K/UL — HIGH (ref 1.8–7.4)
NEUTROPHILS NFR BLD AUTO: 78 % — HIGH (ref 43–77)
NRBC # BLD AUTO: 0 K/UL — SIGNIFICANT CHANGE UP (ref 0–0)
NRBC # FLD: 0 K/UL — SIGNIFICANT CHANGE UP (ref 0–0)
NRBC BLD AUTO-RTO: 0 /100 WBCS — SIGNIFICANT CHANGE UP (ref 0–0)
PLAT MORPH BLD: NORMAL — SIGNIFICANT CHANGE UP
PLATELET # BLD AUTO: 230 K/UL — SIGNIFICANT CHANGE UP (ref 150–400)
PLATELET COUNT - ESTIMATE: NORMAL — SIGNIFICANT CHANGE UP
RBC # BLD: 4 M/UL — SIGNIFICANT CHANGE UP (ref 3.8–5.2)
RBC # FLD: 14.5 % — SIGNIFICANT CHANGE UP (ref 10.3–14.5)
RBC BLD AUTO: SIGNIFICANT CHANGE UP
T PALLIDUM AB TITR SER: NEGATIVE — SIGNIFICANT CHANGE UP
WBC # BLD: 14.24 K/UL — HIGH (ref 3.8–10.5)
WBC # FLD AUTO: 14.24 K/UL — HIGH (ref 3.8–10.5)

## 2025-03-02 RX ORDER — PRENATAL 136/IRON/FOLIC ACID 27 MG-1 MG
1 TABLET ORAL
Qty: 0 | Refills: 0 | DISCHARGE
Start: 2025-03-02

## 2025-03-02 RX ORDER — IBUPROFEN 200 MG
1 TABLET ORAL
Qty: 0 | Refills: 0 | DISCHARGE
Start: 2025-03-02

## 2025-03-02 RX ORDER — ACETAMINOPHEN 500 MG/5ML
3 LIQUID (ML) ORAL
Qty: 0 | Refills: 0 | DISCHARGE
Start: 2025-03-02

## 2025-03-02 RX ADMIN — Medication 975 MILLIGRAM(S): at 18:39

## 2025-03-02 RX ADMIN — Medication 975 MILLIGRAM(S): at 17:39

## 2025-03-02 RX ADMIN — Medication 3 MILLILITER(S): at 14:33

## 2025-03-02 NOTE — PROGRESS NOTE ADULT - SUBJECTIVE AND OBJECTIVE BOX
Pt without complaints. Lochia moderate  Vital Signs Last 24 Hrs  T(C): 36.7 (02 Mar 2025 06:43), Max: 37.1 (01 Mar 2025 22:05)  T(F): 98.1 (02 Mar 2025 06:43), Max: 98.7 (01 Mar 2025 22:05)  HR: 91 (02 Mar 2025 06:43) (81 - 110)  BP: 128/73 (02 Mar 2025 06:43) (94/54 - 128/73)  BP(mean): --  RR: 19 (02 Mar 2025 06:43) (16 - 19)  SpO2: 99% (02 Mar 2025 06:43) (98% - 100%)    Parameters below as of 02 Mar 2025 06:43  Patient On (Oxygen Delivery Method): room air    Blood Type: O Positive    abdomen soft, non-tender, fundus firm  extremities non tender    s/p   doing well  d/c home

## 2025-03-03 ENCOUNTER — APPOINTMENT (OUTPATIENT)
Dept: OBGYN | Facility: CLINIC | Age: 40
End: 2025-03-03

## 2025-03-31 ENCOUNTER — APPOINTMENT (OUTPATIENT)
Dept: OBGYN | Facility: CLINIC | Age: 40
End: 2025-03-31
Payer: COMMERCIAL

## 2025-03-31 VITALS
DIASTOLIC BLOOD PRESSURE: 66 MMHG | HEIGHT: 67 IN | SYSTOLIC BLOOD PRESSURE: 102 MMHG | BODY MASS INDEX: 27.78 KG/M2 | WEIGHT: 177 LBS

## 2025-03-31 PROCEDURE — 99203 OFFICE O/P NEW LOW 30 MIN: CPT

## 2025-04-14 ENCOUNTER — APPOINTMENT (OUTPATIENT)
Dept: OBGYN | Facility: CLINIC | Age: 40
End: 2025-04-14
Payer: COMMERCIAL

## 2025-04-14 VITALS
BODY MASS INDEX: 27.62 KG/M2 | DIASTOLIC BLOOD PRESSURE: 78 MMHG | SYSTOLIC BLOOD PRESSURE: 114 MMHG | WEIGHT: 176 LBS | HEIGHT: 67 IN

## 2025-04-14 PROCEDURE — 0503F POSTPARTUM CARE VISIT: CPT

## 2025-04-17 NOTE — ASU PATIENT PROFILE, ADULT - FALL HARM RISK - FALLEN IN PAST
Pre op complete. Waiting on anesthesia consent, update, and site endy. Belongings in locker.    Pt resting comfortably with all questions addressed at this time and call light in reach.   
VSS. Pt able to tolerate oral liquids. Pt denies c/o pain. Dressing and surgical shoe intact. No distress noted. Pt states she is ready for D/C. D/C instructions reviewed with pt and sister, verbalized understanding.     
Xray at bedside   
No

## 2025-05-30 ENCOUNTER — APPOINTMENT (OUTPATIENT)
Dept: OBGYN | Facility: HOSPITAL | Age: 40
End: 2025-05-30

## 2025-07-11 NOTE — OB RN DELIVERY SUMMARY - NS_ADMITLABOR_OBGYN_ALL_OB
RN spoke with pt, pt stated that her foot got caught in the door and a metal piece that was sticking out hit the side of her foot and she started bleeding. Pt stated she was able to hold pressure on it and bleeding did stop. Pt denies any signs of infection. Pt denies any fevers, chills, severe pain, numbness, tingling, ongoing bleeding, or any other symptoms. Pt last Tdap vaccination was 10 years ago, pt is inquiring if she should get a booster. RN recommended pt does get Tdap vaccination, advised to apply antibiotic ointment to the area, and keep clean. Call back if wound worsens. Pt acknowledges understanding and agreeable. Rn scheduled NV today 7/11 for Tdap booster.    Disposition: Appt within 3 days to receive Tdap; pt scheduled for NV today 7/11    Reason for Disposition   Last tetanus shot > 10 years ago and CLEAN cut    Protocols used: Cuts and [id number]    
No

## 2025-07-22 ENCOUNTER — APPOINTMENT (OUTPATIENT)
Dept: OBGYN | Facility: CLINIC | Age: 40
End: 2025-07-22
Payer: COMMERCIAL

## 2025-07-22 VITALS
HEIGHT: 67 IN | DIASTOLIC BLOOD PRESSURE: 75 MMHG | BODY MASS INDEX: 27.31 KG/M2 | WEIGHT: 174 LBS | SYSTOLIC BLOOD PRESSURE: 111 MMHG

## 2025-07-22 DIAGNOSIS — N91.2 AMENORRHEA, UNSPECIFIED: ICD-10-CM

## 2025-07-22 PROCEDURE — 99213 OFFICE O/P EST LOW 20 MIN: CPT

## 2025-07-22 PROCEDURE — 36415 COLL VENOUS BLD VENIPUNCTURE: CPT

## 2025-07-22 RX ORDER — MEDROXYPROGESTERONE ACETATE 10 MG/1
10 TABLET ORAL DAILY
Qty: 7 | Refills: 0 | Status: ACTIVE | COMMUNITY
Start: 2025-07-22 | End: 1900-01-01

## 2025-07-25 ENCOUNTER — APPOINTMENT (OUTPATIENT)
Dept: ANTEPARTUM | Facility: CLINIC | Age: 40
End: 2025-07-25
Payer: COMMERCIAL

## 2025-07-25 ENCOUNTER — APPOINTMENT (OUTPATIENT)
Dept: OBGYN | Facility: CLINIC | Age: 40
End: 2025-07-25
Payer: COMMERCIAL

## 2025-07-25 ENCOUNTER — ASOB RESULT (OUTPATIENT)
Age: 40
End: 2025-07-25

## 2025-07-25 VITALS
HEIGHT: 67 IN | DIASTOLIC BLOOD PRESSURE: 76 MMHG | BODY MASS INDEX: 27.31 KG/M2 | SYSTOLIC BLOOD PRESSURE: 121 MMHG | WEIGHT: 174 LBS

## 2025-07-25 DIAGNOSIS — Z3A.24 24 WEEKS GESTATION OF PREGNANCY: ICD-10-CM

## 2025-07-25 DIAGNOSIS — Z3A.36 36 WEEKS GESTATION OF PREGNANCY: ICD-10-CM

## 2025-07-25 DIAGNOSIS — Z3A.15 15 WEEKS GESTATION OF PREGNANCY: ICD-10-CM

## 2025-07-25 DIAGNOSIS — Z3A.16 16 WEEKS GESTATION OF PREGNANCY: ICD-10-CM

## 2025-07-25 PROCEDURE — 76857 US EXAM PELVIC LIMITED: CPT

## 2025-07-25 PROCEDURE — 76830 TRANSVAGINAL US NON-OB: CPT

## 2025-07-25 PROCEDURE — 99213 OFFICE O/P EST LOW 20 MIN: CPT

## 2025-07-27 LAB — HCG SERPL-MCNC: <1 MIU/ML

## 2025-08-05 LAB
FSH SERPL-MCNC: 2.3 IU/L
LH SERPL-ACNC: 7.5 IU/L
PROGEST SERPL-MCNC: 10.9 NG/ML
PROLACTIN SERPL-MCNC: 24.4 NG/ML

## 2025-08-19 ENCOUNTER — APPOINTMENT (OUTPATIENT)
Dept: OBGYN | Facility: CLINIC | Age: 40
End: 2025-08-19

## 2025-08-19 VITALS
HEIGHT: 67 IN | SYSTOLIC BLOOD PRESSURE: 118 MMHG | DIASTOLIC BLOOD PRESSURE: 79 MMHG | WEIGHT: 176 LBS | BODY MASS INDEX: 27.62 KG/M2

## 2025-08-19 PROCEDURE — 99213 OFFICE O/P EST LOW 20 MIN: CPT

## 2025-08-27 ENCOUNTER — APPOINTMENT (OUTPATIENT)
Dept: OBGYN | Facility: CLINIC | Age: 40
End: 2025-08-27

## 2025-09-04 ENCOUNTER — NON-APPOINTMENT (OUTPATIENT)
Age: 40
End: 2025-09-04

## 2025-09-12 ENCOUNTER — APPOINTMENT (OUTPATIENT)
Dept: OBGYN | Facility: CLINIC | Age: 40
End: 2025-09-12

## 2025-09-12 VITALS
WEIGHT: 175 LBS | SYSTOLIC BLOOD PRESSURE: 107 MMHG | HEIGHT: 67 IN | DIASTOLIC BLOOD PRESSURE: 68 MMHG | BODY MASS INDEX: 27.47 KG/M2

## 2025-09-12 PROCEDURE — 99213 OFFICE O/P EST LOW 20 MIN: CPT

## (undated) DEVICE — ELCTR GROUNDING PAD ADULT COVIDIEN

## (undated) DEVICE — LABEL MED BLANK W PEN

## (undated) DEVICE — SUT PLAIN GUT FAST ABSORBING 5-0 PC-1

## (undated) DEVICE — BLADE SAFETY LOCK #15

## (undated) DEVICE — PACK MINOR WITH LAP

## (undated) DEVICE — SYR LUER LOK 20CC

## (undated) DEVICE — SUT DERMABOND 0.7ML

## (undated) DEVICE — DRAPE SPLIT SHEETS 77X108"

## (undated) DEVICE — POSITIONER STRAP ARMBOARD VELCRO TS-30 12

## (undated) DEVICE — DRAPE IOBAN 10X5"

## (undated) DEVICE — ELCTR EDGE BOVIE INSULATED BLADE TIP 2.75"

## (undated) DEVICE — STAPLER SKIN VISI-STAT 35 WIDE

## (undated) DEVICE — WRAP COMPRESSION CALF MED

## (undated) DEVICE — DRAIN BLAKE 15FR ROUND

## (undated) DEVICE — DRAPE HALF SHEET 40X57"

## (undated) DEVICE — PREP BETADINE KIT

## (undated) DEVICE — DRSG SURGICAL BRA W LOOPS FOR JP DRAIN 42-45"

## (undated) DEVICE — DRAPE TOWEL BLUE 17" X 24"

## (undated) DEVICE — DRAPE 3/4 SHEET 52X76"

## (undated) DEVICE — SUT VICRYL 2-0 27" SH UNDYED

## (undated) DEVICE — SUT MONOCRYL 3-0 27" PS-2 UNDYED

## (undated) DEVICE — DRSG SURGICAL BRA PINK SMALL 30-33"

## (undated) DEVICE — SUT MONOCRYL 4-0 18" P-3 UNDYED

## (undated) DEVICE — SUT DERMABOND PRINEO 42CM

## (undated) DEVICE — ELCTR PENCIL NEPTUNE SMOKE EVACUATION

## (undated) DEVICE — DRSG STERISTRIPS 0.5X4"

## (undated) DEVICE — SUT VICRYL 3-0 27" SH UNDYED

## (undated) DEVICE — POSITIONER PATIENT SAFETY STRAP 3X60"

## (undated) DEVICE — DRSG TEGADERM 3.5X6"

## (undated) DEVICE — DRAIN RESERVOIR FOR JACKSON PRATT 100CC MEDLINE

## (undated) DEVICE — DRAPE INSTRUMENT POUCH

## (undated) DEVICE — BLANKET WARMER LOWER ADULT

## (undated) DEVICE — DRSG SURGICAL BRA W LOOPS FOR JP DRAIN 36-39"

## (undated) DEVICE — SUT MONOCRYL 3-0 18" PS-2 UNDYED